# Patient Record
Sex: MALE | Race: WHITE | NOT HISPANIC OR LATINO | Employment: FULL TIME | URBAN - METROPOLITAN AREA
[De-identification: names, ages, dates, MRNs, and addresses within clinical notes are randomized per-mention and may not be internally consistent; named-entity substitution may affect disease eponyms.]

---

## 2018-10-01 ENCOUNTER — OFFICE VISIT (OUTPATIENT)
Dept: URGENT CARE | Facility: CLINIC | Age: 58
End: 2018-10-01
Payer: COMMERCIAL

## 2018-10-01 VITALS
HEIGHT: 70 IN | OXYGEN SATURATION: 97 % | HEART RATE: 68 BPM | SYSTOLIC BLOOD PRESSURE: 114 MMHG | BODY MASS INDEX: 35.5 KG/M2 | DIASTOLIC BLOOD PRESSURE: 80 MMHG | RESPIRATION RATE: 16 BRPM | TEMPERATURE: 97.4 F | WEIGHT: 248 LBS

## 2018-10-01 DIAGNOSIS — L08.9 BLISTER OF TOE OF LEFT FOOT WITH INFECTION, INITIAL ENCOUNTER: ICD-10-CM

## 2018-10-01 DIAGNOSIS — S90.425A BLISTER OF TOE OF LEFT FOOT WITH INFECTION, INITIAL ENCOUNTER: ICD-10-CM

## 2018-10-01 DIAGNOSIS — H93.8X3 EAR FULLNESS, BILATERAL: ICD-10-CM

## 2018-10-01 PROCEDURE — 69210 REMOVE IMPACTED EAR WAX UNI: CPT | Performed by: NURSE PRACTITIONER

## 2018-10-01 PROCEDURE — 99204 OFFICE O/P NEW MOD 45 MIN: CPT | Mod: 25 | Performed by: NURSE PRACTITIONER

## 2018-10-01 RX ORDER — SULFAMETHOXAZOLE AND TRIMETHOPRIM 800; 160 MG/1; MG/1
1 TABLET ORAL 2 TIMES DAILY
Qty: 14 TAB | Refills: 0 | Status: SHIPPED | OUTPATIENT
Start: 2018-10-01 | End: 2018-10-08

## 2018-10-01 RX ORDER — SULFAMETHOXAZOLE AND TRIMETHOPRIM 800; 160 MG/1; MG/1
1 TABLET ORAL 2 TIMES DAILY
Qty: 14 TAB | Refills: 0 | Status: SHIPPED | OUTPATIENT
Start: 2018-10-01 | End: 2018-10-01 | Stop reason: SDUPTHER

## 2018-10-01 RX ORDER — ASPIRIN 81 MG/1
81 TABLET, CHEWABLE ORAL DAILY
COMMUNITY
End: 2018-10-31

## 2018-10-01 ASSESSMENT — ENCOUNTER SYMPTOMS
SORE THROAT: 0
SHORTNESS OF BREATH: 0
MYALGIAS: 0
DIZZINESS: 0
NAUSEA: 0
EYE PAIN: 0
VOMITING: 0
FATIGUE: 0
FEVER: 0
CHILLS: 0

## 2018-10-01 NOTE — PROGRESS NOTES
Subjective:     Edd Beal is a 57 y.o. male who presents for Blister (x 3wks, blister on Lt. small toe, not getting better) and Ear Fullness (x 1 wk, Rt. ear fullness)       Blister   This is a new problem. Episode onset: 3weeks. The problem occurs constantly. The problem has been unchanged. Pertinent negatives include no chest pain, chills, congestion, fatigue, fever, myalgias, nausea, rash, sore throat or vomiting. Nothing aggravates the symptoms. He has tried nothing for the symptoms. The treatment provided no relief.   Ear Fullness   This is a new problem. The current episode started 1 to 4 weeks ago. The problem occurs constantly. The problem has been unchanged. Pertinent negatives include no chest pain, chills, congestion, fatigue, fever, myalgias, nausea, rash, sore throat or vomiting. Nothing aggravates the symptoms. Treatments tried: otc flushes. The treatment provided no relief.   History reviewed. No pertinent past medical history.History reviewed. No pertinent surgical history.  Social History     Social History   • Marital status:      Spouse name: N/A   • Number of children: N/A   • Years of education: N/A     Occupational History   • Not on file.     Social History Main Topics   • Smoking status: Never Smoker   • Smokeless tobacco: Never Used   • Alcohol use Not on file   • Drug use: Unknown   • Sexual activity: Not on file     Other Topics Concern   • Not on file     Social History Narrative   • No narrative on file    History reviewed. No pertinent family history. Review of Systems   Constitutional: Negative for chills, fatigue and fever.   HENT: Negative for congestion and sore throat.    Eyes: Negative for pain.   Respiratory: Negative for shortness of breath.    Cardiovascular: Negative for chest pain.   Gastrointestinal: Negative for nausea and vomiting.   Genitourinary: Negative for hematuria.   Musculoskeletal: Negative for myalgias.   Skin: Negative for rash.  "  Neurological: Negative for dizziness.   No Known Allergies   Objective:   /80 (BP Location: Left arm, Patient Position: Sitting, BP Cuff Size: Large adult)   Pulse 68   Temp 36.3 °C (97.4 °F) (Temporal)   Resp 16   Ht 1.778 m (5' 10\")   Wt 112.5 kg (248 lb)   SpO2 97%   BMI 35.58 kg/m²   Physical Exam   Constitutional: He is oriented to person, place, and time. He appears well-developed and well-nourished. No distress.   HENT:   Head: Normocephalic and atraumatic.   Right Ear: External ear and ear canal normal.   Left Ear: External ear and ear canal normal.   Nose: Nose normal. Right sinus exhibits no maxillary sinus tenderness and no frontal sinus tenderness. Left sinus exhibits no maxillary sinus tenderness and no frontal sinus tenderness.   Mouth/Throat: Uvula is midline, oropharynx is clear and moist and mucous membranes are normal. No posterior oropharyngeal edema, posterior oropharyngeal erythema or tonsillar abscesses. No tonsillar exudate.   Cerumen impaction bilaterally      Eyes: Pupils are equal, round, and reactive to light. Conjunctivae and EOM are normal. Right eye exhibits no discharge. Left eye exhibits no discharge.   Cardiovascular: Normal rate and regular rhythm.    No murmur heard.  Pulmonary/Chest: Effort normal and breath sounds normal. No respiratory distress.   Abdominal: Soft. He exhibits no distension. There is no tenderness.   Musculoskeletal:        Feet:    Neurological: He is alert and oriented to person, place, and time. He has normal reflexes. No sensory deficit.   Skin: Skin is warm, dry and intact.   Psychiatric: He has a normal mood and affect.         Assessment/Plan:   Assessment    1. Blister of toe of left foot with infection, initial encounter  mupirocin (BACTROBAN) 2 % Ointment    REFERRAL TO FAMILY PRACTICE    sulfamethoxazole-trimethoprim (BACTRIM DS) 800-160 MG tablet    DISCONTINUED: sulfamethoxazole-trimethoprim (BACTRIM DS) 800-160 MG tablet   2. Ear " fullness, bilateral  REFERRAL TO FAMILY PRACTICE     Procedure: Cerumen Removal  Risks and benefits of procedure discussed  Cerumen removed with curette and lavage after softening agent instilled  Patient tolerated well  Post procedure exam with clear canal and normal TM    Advised to use topical ointment on left toe. Keep covered, separate toe with gauze. If not improved in 3-4 days advised to start oral abx therapy.  Contingent antibiotic prescription given to patient to fill upon meeting criteria of guidelines discussed.      Differential diagnosis, natural history, supportive care, and indications for immediate follow-up discussed.

## 2018-10-31 ENCOUNTER — OFFICE VISIT (OUTPATIENT)
Dept: INTERNAL MEDICINE | Facility: MEDICAL CENTER | Age: 58
End: 2018-10-31
Payer: COMMERCIAL

## 2018-10-31 VITALS
WEIGHT: 253.13 LBS | HEART RATE: 58 BPM | TEMPERATURE: 97.7 F | BODY MASS INDEX: 36.24 KG/M2 | OXYGEN SATURATION: 96 % | HEIGHT: 70 IN | DIASTOLIC BLOOD PRESSURE: 76 MMHG | SYSTOLIC BLOOD PRESSURE: 114 MMHG

## 2018-10-31 DIAGNOSIS — G47.33 OBSTRUCTIVE SLEEP APNEA SYNDROME: ICD-10-CM

## 2018-10-31 DIAGNOSIS — Z87.11 HISTORY OF GASTRIC ULCER: ICD-10-CM

## 2018-10-31 DIAGNOSIS — E78.5 DYSLIPIDEMIA: ICD-10-CM

## 2018-10-31 DIAGNOSIS — N40.0 BENIGN PROSTATIC HYPERPLASIA WITHOUT LOWER URINARY TRACT SYMPTOMS: ICD-10-CM

## 2018-10-31 PROCEDURE — 99204 OFFICE O/P NEW MOD 45 MIN: CPT | Mod: GC | Performed by: INTERNAL MEDICINE

## 2018-10-31 RX ORDER — PANTOPRAZOLE SODIUM 40 MG/1
40 TABLET, DELAYED RELEASE ORAL DAILY
COMMUNITY
End: 2018-11-19 | Stop reason: SDUPTHER

## 2018-10-31 RX ORDER — TAMSULOSIN HYDROCHLORIDE 0.4 MG/1
0.4 CAPSULE ORAL
COMMUNITY
End: 2018-11-19 | Stop reason: SDUPTHER

## 2018-10-31 ASSESSMENT — PATIENT HEALTH QUESTIONNAIRE - PHQ9: CLINICAL INTERPRETATION OF PHQ2 SCORE: 0

## 2018-10-31 NOTE — PROGRESS NOTES
New Patient to Establish    Reason to establish: New patient to establish    CC:   Chief Complaint   Patient presents with   • Establish Care   • Medication Refill     Pantoprazole and tamsulosin   • Orders Needed     CPAP. Pt uses [facility]       HPI:   58 yo M came in to establish. He came to [place] from [place] a few months ago due to his wife's medical problems and specialist need.    He denies any complaint today.   He has JOAQUINA and usually gets 90 day supply for mask from [employer]. He just need a PCP name to update to the company.   He was Dx 10 years ago and reevaluated 5 years ago. He was reluctant to go back to sleep clinic, saying that he previous PCP managed with the supplies.     He is on tamsulosin for BPH, only gets up twice at night.     He is on pantoprazole since 2010 when he was Dx with gastric ulcer. He was wondering if he can get off these meds.  He does not recall H pylori infection or the size of the ulcer. He could not recall gastroenterologist's name. He was in [place] for a long time where he has most medical records and moved to [place].    ROS:   All other systems reviewed, negative except as stated above.    Patient Active Problem List    Diagnosis Date Noted   • Obstructive sleep apnea syndrome 10/31/2018   • Benign prostatic hyperplasia without lower urinary tract symptoms 10/31/2018   • Dyslipidemia 10/31/2018   • History of gastric ulcer 10/31/2018       Past Medical History:   Diagnosis Date   • Facial basal cell cancer 2007    lip, no recurrence       Current Outpatient Prescriptions   Medication Sig Dispense Refill   • tamsulosin (FLOMAX) 0.4 MG capsule Take 0.4 mg by mouth ONE-HALF HOUR AFTER BREAKFAST.     • pantoprazole (PROTONIX) 40 MG Tablet Delayed Response Take 40 mg by mouth every day.       No current facility-administered medications for this visit.        Allergies as of 10/31/2018   • (No Known Allergies)       Social History     Social History   • Marital status:       "Spouse name: N/A   • Number of children: N/A   • Years of education: N/A     Occupational History   • Not on file.     Social History Main Topics   • Smoking status: Never Smoker   • Smokeless tobacco: Never Used   • Alcohol use Yes      Comment: occa   • Drug use: Yes   • Sexual activity: Not on file     Other Topics Concern   • Not on file     Social History Narrative   • No narrative on file       Family History   Problem Relation Age of Onset   • Cancer Mother 60        colon cancer   • Heart Disease Mother         CHF       Past Surgical History:   Procedure Laterality Date   • ATHROPLASTY      right knee   • EYE SURGERY      cataracts bilateral, corneal transplant   • HERNIA REPAIR           /76 (BP Location: Right arm, Patient Position: Sitting, BP Cuff Size: Large adult)   Pulse (!) 58   Temp 36.5 °C (97.7 °F) (Temporal)   Ht 1.778 m (5' 10\")   Wt 114.8 kg (253 lb 2 oz)   SpO2 96%   BMI 36.32 kg/m²     Physical Exam  General: Alert and oriented, No apparent distress.  Eyes: Pupils are equal and reactive. No scleral icterus.  Throat: Clear no erythema or exudates noted.  Neck: Supple. No lymphadenopathy noted. Thyroid not enlarged.  Lungs: Clear to auscultation bilaterally without any wheezing, crepitations.  Cardiovascular: Regular rate and rhythm. No murmurs, rubs or gallops.  Abdomen: Bowel sound +, soft, non tender, no rebound or guarding, no palpable organomegaly  Extremities: No clubbing, cyanosis, edema.  Skin: No rash or suspicious skin lesions noted.  Neuro: A & O x 3. Normal speech and memory. Motor and sensory grossly normal.     Note: I have reviewed all pertinent labs and diagnostic tests associated with this visit with specific comments listed under the assessment and plan below    Assessment and Plan    1. Obstructive sleep apnea syndrome  He declined sleep clinic referral at this time despite explaining the need of regular monitoring and possible need for adjustment of CPAP " setting. He will let us know when he notices symptom changes.     2. Benign prostatic hyperplasia without lower urinary tract symptoms  Symptoms controlled with tamsulosin, continue. Explained that he will likely require this medication for lifelong due to worsening BPH with age.    3. Dyslipidemia  No history of CAD, stroke or PVD. Non smoker.   - continue lifestyle management. Will get record for recent lab in 7/2018.    4. History of gastric ulcer  - continue pantoprazole for now. Will get previous records.    Td - 2017  Colonoscopy - 2014, said to be normal.  Declined flu shot today.    Followup: Return in about 6 months (around 4/30/2019).      Signed by: Kathy Boyd M.D.

## 2018-10-31 NOTE — LETTER
[facility]  Kathy Boyd M.D.  [address]  [place] [address]  Fax: [phone]   Authorization for Release/Disclosure of   Protected Health Information   Name: CHICA VENTURA : 1960 SSN: [id number]   Address: [address]  [place] [address] Phone:    [phone] (home)    I authorize the entity listed below to release/disclose the PHI below to:   [facility]/Kathy Boyd M.D. and Kathy Boyd M.D.   Provider or Entity Name: [facility] Alex Morfin     Address   [facility], [place], [place]   Phone:      Fax:     Reason for request: continuity of care   Information to be released:    [ X ] LAST COLONOSCOPY,  including any PATH REPORT and follow-up  [  ] LAST FIT/COLOGUARD RESULT [  ] LAST DEXA  [  ] LAST MAMMOGRAM  [  ] LAST PAP  [X ] LAST LABS [  ] RETINA EXAM REPORT  [X  ] IMMUNIZATION RECORDS  [ X ] Release all info      [  ] Check here and initial the line next to each item to release ALL health information INCLUDING  _____ Care and treatment for drug and / or alcohol abuse  _____ HIV testing, infection status, or AIDS  _____ Genetic Testing    DATES OF SERVICE OR TIME PERIOD TO BE DISCLOSED: _____________  I understand and acknowledge that:  * This Authorization may be revoked at any time by you in writing, except if your health information has already been used or disclosed.  * Your health information that will be used or disclosed as a result of you signing this authorization could be re-disclosed by the recipient. If this occurs, your re-disclosed health information may no longer be protected by State or Federal laws.  * You may refuse to sign this Authorization. Your refusal will not affect your ability to obtain treatment.  * This Authorization becomes effective upon signing and will  on (date) __________.      If no date is indicated, this Authorization will  one (1) year from the signature date.    Name: Chica Ventura    Signature:   Date:          10/31/2018       PLEASE FAX REQUESTED RECORDS BACK TO: [phone]

## 2018-10-31 NOTE — LETTER
[facility]  Kathy Boyd M.D.  [address]  [place] [address]  Fax: [phone]   Authorization for Release/Disclosure of   Protected Health Information   Name: CHICA VENTURA : 1960 SSN: [id number]   Address: [address]  [place] [address] Phone:    [phone] (home)    I authorize the entity listed below to release/disclose the PHI below to:   [facility]/Kathy Boyd M.D. and Kahty Boyd M.D.   Provider or Entity Name: [facility]     Address   City, [facility]   Phone:      Fax:     Reason for request: continuity of care   Information to be released:    [X} LAST COLONOSCOPY,  including any PATH REPORT and follow-up  [  ] LAST FIT/COLOGUARD RESULT [  ] LAST DEXA  [  ] LAST MAMMOGRAM  [  ] LAST PAP  [  ] LAST LABS [  ] RETINA EXAM REPORT  [  ] IMMUNIZATION RECORDS  [  ] Release all info      [  ] Check here and initial the line next to each item to release ALL health information INCLUDING  _____ Care and treatment for drug and / or alcohol abuse  _____ HIV testing, infection status, or AIDS  _____ Genetic Testing    DATES OF SERVICE OR TIME PERIOD TO BE DISCLOSED: _____________  I understand and acknowledge that:  * This Authorization may be revoked at any time by you in writing, except if your health information has already been used or disclosed.  * Your health information that will be used or disclosed as a result of you signing this authorization could be re-disclosed by the recipient. If this occurs, your re-disclosed health information may no longer be protected by State or Federal laws.  * You may refuse to sign this Authorization. Your refusal will not affect your ability to obtain treatment.  * This Authorization becomes effective upon signing and will  on (date) __________.      If no date is indicated, this Authorization will  one (1) year from the signature date.    Name: Chica Ventura    Signature:   Date:     10/31/2018            PLEASE FAX REQUESTED RECORDS BACK TO: [phone]

## 2018-11-09 ENCOUNTER — PATIENT MESSAGE (OUTPATIENT)
Dept: INTERNAL MEDICINE | Facility: MEDICAL CENTER | Age: 58
End: 2018-11-09

## 2018-11-09 DIAGNOSIS — Z87.19 HISTORY OF GI BLEED: ICD-10-CM

## 2018-11-09 DIAGNOSIS — E78.5 DYSLIPIDEMIA: ICD-10-CM

## 2018-11-09 NOTE — PATIENT COMMUNICATION
Called and spoke with pt. Pt said he walks a lot at his work. His right knee up to his hip feels like it's on fire. I asked if there is any discoloration/swelling or warm to the touch. Pt said no, only it feels warm to the touch. Pt is concerned if he has a blood clot. Per pt, he said before he felt like this but with swelling. He just has not swelling with this.  I asked pt I can look to see if there is any opening this afternoon in our clinic. Pt said he wouldn't be able to come today because he will be going to work in 30 min. Pt asked if he can make an appt for Monday. I recommended pt to be seen at an  to get this evaluated and not to wait until Monday. Pt understood. He said he will go tomorrow.       Pt also wanted to see if we got labs from [place]. I notified pt we have not. Pt is worried because he only has 3 weeks left of his medication. I asked pt if he would like Dr Boyd to order his labs. He said Yes.  Pt goes to [facility] on [facility].

## 2018-11-09 NOTE — TELEPHONE ENCOUNTER
From: Edd Beal  To: Kathy Boyd M.D.  Sent: 11/9/2018 11:42 AM PST  Subject: Procedure Question    Are you sending [facility] two prescriptions to [facility]. I am having burning pain in my leg when I’m walking. Can it be due to the blood clot I had in the past

## 2018-11-10 ENCOUNTER — OFFICE VISIT (OUTPATIENT)
Dept: URGENT CARE | Facility: CLINIC | Age: 58
End: 2018-11-10
Payer: COMMERCIAL

## 2018-11-10 ENCOUNTER — APPOINTMENT (OUTPATIENT)
Dept: RADIOLOGY | Facility: IMAGING CENTER | Age: 58
End: 2018-11-10
Attending: EMERGENCY MEDICINE
Payer: COMMERCIAL

## 2018-11-10 VITALS
DIASTOLIC BLOOD PRESSURE: 80 MMHG | SYSTOLIC BLOOD PRESSURE: 120 MMHG | HEIGHT: 70 IN | OXYGEN SATURATION: 98 % | WEIGHT: 249 LBS | RESPIRATION RATE: 16 BRPM | TEMPERATURE: 97.9 F | BODY MASS INDEX: 35.65 KG/M2 | HEART RATE: 77 BPM

## 2018-11-10 DIAGNOSIS — Z86.718 HISTORY OF DVT (DEEP VEIN THROMBOSIS): ICD-10-CM

## 2018-11-10 DIAGNOSIS — M79.651 PAIN OF RIGHT THIGH: ICD-10-CM

## 2018-11-10 PROCEDURE — 99202 OFFICE O/P NEW SF 15 MIN: CPT | Performed by: EMERGENCY MEDICINE

## 2018-11-10 PROCEDURE — 73552 X-RAY EXAM OF FEMUR 2/>: CPT | Mod: 26,RT | Performed by: EMERGENCY MEDICINE

## 2018-11-10 ASSESSMENT — ENCOUNTER SYMPTOMS
LEG PAIN: 1
SENSORY CHANGE: 0
FOCAL WEAKNESS: 0
CHANGE IN BOWEL HABIT: 0
TINGLING: 0
NUMBNESS: 0
JOINT SWELLING: 0
BACK PAIN: 0
FEVER: 0

## 2018-11-10 NOTE — PROGRESS NOTES
Subjective:      Edd Beal is a 57 y.o. male who presents with Leg Pain (PAIN GOING FROM RIGHT THIGH UP TO HIP X1 MONTH)            Leg Pain   This is a new problem. The current episode started more than 1 month ago. The problem occurs intermittently. The problem has been waxing and waning. Pertinent negatives include no change in bowel habit, fever, joint swelling, numbness, rash or urinary symptoms. The symptoms are aggravated by walking. He has tried rest for the symptoms. The treatment provided moderate relief.   Past medical history significant for right knee joint replacement, complicated by subsequent right DVT.  Notes over the past multiple weeks intermittent burning pain right anterior thigh radiating proximally and laterally.  No trauma, but notes new occupation with increased walking, stair climbing.    Review of Systems   Constitutional: Negative for fever.   Gastrointestinal: Negative for change in bowel habit.   Musculoskeletal: Negative for back pain, joint pain and joint swelling.   Skin: Negative for rash.   Neurological: Negative for tingling, sensory change, focal weakness and numbness.     PMH:  has a past medical history of Facial basal cell cancer (2007).  MEDS:   Current Outpatient Prescriptions:   •  tamsulosin (FLOMAX) 0.4 MG capsule, Take 0.4 mg by mouth ONE-HALF HOUR AFTER BREAKFAST., Disp: , Rfl:   •  pantoprazole (PROTONIX) 40 MG Tablet Delayed Response, Take 40 mg by mouth every day., Disp: , Rfl:   ALLERGIES: No Known Allergies  SURGHX:   Past Surgical History:   Procedure Laterality Date   • ATHROPLASTY      right knee   • EYE SURGERY      cataracts bilateral, corneal transplant   • HERNIA REPAIR       SOCHX:  reports that he has never smoked. He has never used smokeless tobacco. He reports that he drinks alcohol. He reports that he uses drugs.  FH: family history includes Cancer (age of onset: 60) in his mother; Heart Disease in his mother.       Objective:     /80  "(BP Location: Right arm, Patient Position: Sitting, BP Cuff Size: Adult)   Pulse 77   Temp 36.6 °C (97.9 °F) (Temporal)   Resp 16   Ht 1.778 m (5' 10\")   Wt 112.9 kg (249 lb)   SpO2 98%   BMI 35.73 kg/m²      Physical Exam   Constitutional: Vital signs are normal. He appears well-developed and well-nourished. He is cooperative. He does not have a sickly appearance. He does not appear ill. No distress.   Cardiovascular:   Pulses:       Popliteal pulses are 2+ on the right side.        Dorsalis pedis pulses are 2+ on the right side.        Posterior tibial pulses are 2+ on the right side.   No significant pedal edema. No calf tenderness, Homans sign negative.   Pulmonary/Chest: Effort normal.   Musculoskeletal:        Right knee: He exhibits normal range of motion, no swelling, no effusion, no deformity and normal alignment. No tenderness found.        Lumbar back: Normal.        Right upper leg: He exhibits no tenderness, no swelling, no edema and no deformity.   Notes pain right thigh associated with full internal and external rotation of hip.   Neurological: He is alert.   Distal motor function intact. Distal motor function intact.   Skin: Skin is warm, dry and intact. No rash noted.   Psychiatric: He has a normal mood and affect.               Assessment/Plan:     1. Pain of right thigh  Suspect muscular; advised stretches, ice, OTC analgesia  REF SPORTS MED  - DX-FEMUR-2+ RIGHT; per radiologist:  1.  No radiographic evidence of acute traumatic injury.  2.  Right knee arthroplasty.    2. History of DVT (deep vein thrombosis)  - D-DIMER; Future      "

## 2018-11-10 NOTE — TELEPHONE ENCOUNTER
Order placed for labs. Please either mail to patient or fax to lab per patient preference. Agreed with urgent evaluation if concerns for clot.

## 2018-11-12 ENCOUNTER — HOSPITAL ENCOUNTER (OUTPATIENT)
Dept: LAB | Facility: MEDICAL CENTER | Age: 58
End: 2018-11-12
Attending: EMERGENCY MEDICINE
Payer: COMMERCIAL

## 2018-11-12 ENCOUNTER — TELEPHONE (OUTPATIENT)
Dept: URGENT CARE | Facility: CLINIC | Age: 58
End: 2018-11-12

## 2018-11-12 DIAGNOSIS — M79.651 PAIN OF RIGHT THIGH: ICD-10-CM

## 2018-11-12 DIAGNOSIS — Z86.718 HISTORY OF DVT (DEEP VEIN THROMBOSIS): ICD-10-CM

## 2018-11-12 LAB — D DIMER PPP IA.FEU-MCNC: <0.4 UG/ML (FEU) (ref 0–0.5)

## 2018-11-12 PROCEDURE — 85379 FIBRIN DEGRADATION QUANT: CPT

## 2018-11-12 PROCEDURE — 36415 COLL VENOUS BLD VENIPUNCTURE: CPT

## 2018-11-12 NOTE — PATIENT COMMUNICATION
Faxed [facility] Orders [facility] and also mailed to pt.  Notified pt via "[employer]

## 2018-11-16 ENCOUNTER — HOSPITAL ENCOUNTER (OUTPATIENT)
Dept: LAB | Facility: MEDICAL CENTER | Age: 58
End: 2018-11-16
Attending: INTERNAL MEDICINE
Payer: COMMERCIAL

## 2018-11-16 DIAGNOSIS — E78.5 DYSLIPIDEMIA: ICD-10-CM

## 2018-11-16 DIAGNOSIS — Z87.19 HISTORY OF GI BLEED: ICD-10-CM

## 2018-11-16 LAB
ALBUMIN SERPL BCP-MCNC: 4.3 G/DL (ref 3.2–4.9)
ALBUMIN/GLOB SERPL: 1.6 G/DL
ALP SERPL-CCNC: 87 U/L (ref 30–99)
ALT SERPL-CCNC: 36 U/L (ref 2–50)
ANION GAP SERPL CALC-SCNC: 9 MMOL/L (ref 0–11.9)
AST SERPL-CCNC: 32 U/L (ref 12–45)
BASOPHILS # BLD AUTO: 0.7 % (ref 0–1.8)
BASOPHILS # BLD: 0.06 K/UL (ref 0–0.12)
BILIRUB SERPL-MCNC: 0.7 MG/DL (ref 0.1–1.5)
BUN SERPL-MCNC: 17 MG/DL (ref 8–22)
CALCIUM SERPL-MCNC: 9.6 MG/DL (ref 8.5–10.5)
CHLORIDE SERPL-SCNC: 104 MMOL/L (ref 96–112)
CHOLEST SERPL-MCNC: 155 MG/DL (ref 100–199)
CO2 SERPL-SCNC: 26 MMOL/L (ref 20–33)
CREAT SERPL-MCNC: 1.16 MG/DL (ref 0.5–1.4)
EOSINOPHIL # BLD AUTO: 0.2 K/UL (ref 0–0.51)
EOSINOPHIL NFR BLD: 2.2 % (ref 0–6.9)
ERYTHROCYTE [DISTWIDTH] IN BLOOD BY AUTOMATED COUNT: 40.6 FL (ref 35.9–50)
FASTING STATUS PATIENT QL REPORTED: NORMAL
GLOBULIN SER CALC-MCNC: 2.7 G/DL (ref 1.9–3.5)
GLUCOSE SERPL-MCNC: 94 MG/DL (ref 65–99)
HCT VFR BLD AUTO: 46.3 % (ref 42–52)
HDLC SERPL-MCNC: 29 MG/DL
HGB BLD-MCNC: 16.5 G/DL (ref 14–18)
IMM GRANULOCYTES # BLD AUTO: 0.03 K/UL (ref 0–0.11)
IMM GRANULOCYTES NFR BLD AUTO: 0.3 % (ref 0–0.9)
LDLC SERPL CALC-MCNC: 47 MG/DL
LYMPHOCYTES # BLD AUTO: 2.87 K/UL (ref 1–4.8)
LYMPHOCYTES NFR BLD: 31.7 % (ref 22–41)
MCH RBC QN AUTO: 32.7 PG (ref 27–33)
MCHC RBC AUTO-ENTMCNC: 35.6 G/DL (ref 33.7–35.3)
MCV RBC AUTO: 91.9 FL (ref 81.4–97.8)
MONOCYTES # BLD AUTO: 0.68 K/UL (ref 0–0.85)
MONOCYTES NFR BLD AUTO: 7.5 % (ref 0–13.4)
NEUTROPHILS # BLD AUTO: 5.21 K/UL (ref 1.82–7.42)
NEUTROPHILS NFR BLD: 57.6 % (ref 44–72)
NRBC # BLD AUTO: 0 K/UL
NRBC BLD-RTO: 0 /100 WBC
PLATELET # BLD AUTO: 191 K/UL (ref 164–446)
PMV BLD AUTO: 10.8 FL (ref 9–12.9)
POTASSIUM SERPL-SCNC: 3.7 MMOL/L (ref 3.6–5.5)
PROT SERPL-MCNC: 7 G/DL (ref 6–8.2)
RBC # BLD AUTO: 5.04 M/UL (ref 4.7–6.1)
SODIUM SERPL-SCNC: 139 MMOL/L (ref 135–145)
TRIGL SERPL-MCNC: 397 MG/DL (ref 0–149)
WBC # BLD AUTO: 9.1 K/UL (ref 4.8–10.8)

## 2018-11-16 PROCEDURE — 36415 COLL VENOUS BLD VENIPUNCTURE: CPT

## 2018-11-16 PROCEDURE — 85025 COMPLETE CBC W/AUTO DIFF WBC: CPT

## 2018-11-16 PROCEDURE — 80053 COMPREHEN METABOLIC PANEL: CPT

## 2018-11-16 PROCEDURE — 80061 LIPID PANEL: CPT

## 2018-11-19 DIAGNOSIS — N40.0 BENIGN PROSTATIC HYPERPLASIA WITHOUT LOWER URINARY TRACT SYMPTOMS: ICD-10-CM

## 2018-11-19 DIAGNOSIS — Z87.11 HISTORY OF GASTRIC ULCER: ICD-10-CM

## 2018-11-19 RX ORDER — TAMSULOSIN HYDROCHLORIDE 0.4 MG/1
0.4 CAPSULE ORAL
Qty: 90 CAP | Refills: 3 | Status: SHIPPED | OUTPATIENT
Start: 2018-11-19 | End: 2019-06-26

## 2018-11-19 RX ORDER — PANTOPRAZOLE SODIUM 40 MG/1
40 TABLET, DELAYED RELEASE ORAL DAILY
Qty: 90 TAB | Refills: 3 | Status: SHIPPED | OUTPATIENT
Start: 2018-11-19 | End: 2019-06-26

## 2018-11-26 ENCOUNTER — OFFICE VISIT (OUTPATIENT)
Dept: MEDICAL GROUP | Facility: CLINIC | Age: 58
End: 2018-11-26
Payer: COMMERCIAL

## 2018-11-26 VITALS
WEIGHT: 249 LBS | HEIGHT: 70 IN | OXYGEN SATURATION: 98 % | TEMPERATURE: 98.6 F | SYSTOLIC BLOOD PRESSURE: 122 MMHG | BODY MASS INDEX: 35.65 KG/M2 | RESPIRATION RATE: 16 BRPM | DIASTOLIC BLOOD PRESSURE: 82 MMHG | HEART RATE: 74 BPM

## 2018-11-26 DIAGNOSIS — G57.11 MERALGIA PARESTHETICA OF RIGHT SIDE: ICD-10-CM

## 2018-11-26 PROCEDURE — 99203 OFFICE O/P NEW LOW 30 MIN: CPT | Performed by: FAMILY MEDICINE

## 2018-11-26 ASSESSMENT — ENCOUNTER SYMPTOMS
VOMITING: 0
NAUSEA: 0
DIZZINESS: 0
CHILLS: 0
SHORTNESS OF BREATH: 0
HEADACHES: 0
FEVER: 0

## 2018-11-26 NOTE — PROGRESS NOTES
"Subjective:      Edd Beal is a 58 y.o. male who presents with Leg Pain (Referral from [facility]/ R thigh pain )     Referred by Bhavesh Zhang MD for evaluation of RIGHT thigh burning    HPI   RIGHT thigh burning  Insidious onset, approximately mid September 2018  Pain is predominantly at the anterior aspect of the thigh from the region just above his RIGHT knee into the RIGHT proximal thigh  Worse with ambulation/excessive walking  Improved with rest/sitting and massage  No radiation  Denies lumbar spine pain  No night symptoms  Not currently taking medication for pain  POSITIVE prior history of DVT in the RIGHT lower extremity which occurred after his RIGHT total knee replacement back in 2015    Review of Systems   Constitutional: Negative for chills and fever.   Respiratory: Negative for shortness of breath.    Cardiovascular: Negative for chest pain.   Gastrointestinal: Negative for nausea and vomiting.   Neurological: Negative for dizziness and headaches.     PMH:  has a past medical history of Facial basal cell cancer (2007).  MEDS:   Current Outpatient Prescriptions:   •  tamsulosin (FLOMAX) 0.4 MG capsule, Take 1 Cap by mouth ONE-HALF HOUR AFTER BREAKFAST., Disp: 90 Cap, Rfl: 3  •  pantoprazole (PROTONIX) 40 MG Tablet Delayed Response, Take 1 Tab by mouth every day., Disp: 90 Tab, Rfl: 3  ALLERGIES: No Known Allergies  SURGHX:   Past Surgical History:   Procedure Laterality Date   • ATHROPLASTY      right knee   • EYE SURGERY      cataracts bilateral, corneal transplant   • HERNIA REPAIR       SOCHX:  reports that he has never smoked. He has never used smokeless tobacco. He reports that he drinks alcohol. He reports that he uses drugs.  FH: Family history was reviewed, no pertinent findings to report       Objective:     /82 (BP Location: Left arm, Patient Position: Sitting, BP Cuff Size: Adult)   Pulse 74   Temp 37 °C (98.6 °F) (Temporal)   Resp 16   Ht 1.778 m (5' 10\")   Wt 112.9 kg (249 lb) "   SpO2 98%   BMI 35.73 kg/m²      Physical Exam     HIP EXAM:  NORMAL gait    Right hip: Range of motion is intact  NEGATIVE pain with internal rotation  ray's test is NEGATIVE  NO tenderness of the trochanteric bursa  NO tenderness of the gluteus medius  Abad's test is NEGATIVE    Left hip: Range of motion is intact  NEGATIVE pain with internal rotation  ray's test is NEGATIVE  NO tenderness of the trochanteric bursa  NO tenderness of the gluteus medius  Abad's test is NEGATIVE    Lumbar spine exam:  No acute distress  Able to walk on heels and toes  Able to flex to 90° without discomfort  Extension and lateral rotation without discomfort  Strength testing with hip flexion, knee flexion and extension, ankle dorsiflexion and plantarflexion, and EHL testing were 5 out of 5 bilaterally  Sensation was SLIGHTLY DECREASED at the lateral proximal leg and the RIGHT (L5 pattern) compared to the left  The legs were otherwise neurovascularly intact        Assessment/Plan:     1. Meralgia paresthetica of right side       Patient works as a  ([employer]) and wears a utility belt  Since he does not carry very many items, he will stop using his utility belt and use his cargo pants pockets instead to carry his supplies/handcuffs    Return in about 2 weeks (around 12/10/2018).  To see how he is doing after discontinuing regular use of his work belt  He was also provided with myalgia paresthetica           11/10/2018 10:31 AM    HISTORY/REASON FOR EXAM:  Right leg pain..  .    TECHNIQUE/EXAM DESCRIPTION AND NUMBER OF VIEWS:  2 views of the RIGHT femur.    COMPARISON: None    FINDINGS:  There are surgical changes involving the knee consistent with arthroplasty. There are vascular calcifications within the pelvis.   Impression       1.  No radiographic evidence of acute traumatic injury.    2.  Right knee arthroplasty.     Interpreted in the office today with the patient    Thank you Bhavesh Zhang MD for allowing me  to participate in caring for your patient.

## 2019-01-23 ENCOUNTER — TELEPHONE (OUTPATIENT)
Dept: INTERNAL MEDICINE | Facility: MEDICAL CENTER | Age: 59
End: 2019-01-23

## 2019-01-23 DIAGNOSIS — Z12.12 SCREENING FOR COLORECTAL CANCER: Primary | ICD-10-CM

## 2019-01-23 DIAGNOSIS — Z12.11 SCREENING FOR COLORECTAL CANCER: Primary | ICD-10-CM

## 2019-01-24 NOTE — TELEPHONE ENCOUNTER
Patient told me that he completed colonoscopy with previous provider in 2014, said to be normal result. Will request record when he visits our clinic in April.

## 2019-01-30 ENCOUNTER — OFFICE VISIT (OUTPATIENT)
Dept: MEDICAL GROUP | Facility: MEDICAL CENTER | Age: 59
End: 2019-01-30
Payer: COMMERCIAL

## 2019-01-30 VITALS
HEART RATE: 74 BPM | WEIGHT: 255 LBS | DIASTOLIC BLOOD PRESSURE: 70 MMHG | RESPIRATION RATE: 16 BRPM | SYSTOLIC BLOOD PRESSURE: 128 MMHG | TEMPERATURE: 97.4 F | HEIGHT: 70 IN | BODY MASS INDEX: 36.51 KG/M2 | OXYGEN SATURATION: 98 %

## 2019-01-30 DIAGNOSIS — H83.3X2 NOISE-INDUCED HEARING LOSS OF LEFT EAR WITH UNRESTRICTED HEARING OF RIGHT EAR: ICD-10-CM

## 2019-01-30 DIAGNOSIS — Z12.11 SCREEN FOR COLON CANCER: ICD-10-CM

## 2019-01-30 PROCEDURE — 99214 OFFICE O/P EST MOD 30 MIN: CPT | Performed by: NURSE PRACTITIONER

## 2019-01-31 NOTE — ASSESSMENT & PLAN NOTE
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 Was using ear protection but it may have been old and not in place correctly. Having some ear pain/fullness and ringing. Right ear is unaffected.     Denies ear drainage, fevers, external ear pain/tenderness. Does have mild headache. States hearing is better today than it was yesterday.

## 2019-01-31 NOTE — PROGRESS NOTES
"Subjective:   Edd Beal is a 58 y.o. male here today for hearing loss:    Noise-induced hearing loss of left ear with unrestricted hearing of right ear  Started yesterday after target practice. Was using ear protection but it may have been old and not in place correctly. Having some ear pain/fullness and ringing. Right ear is unaffected.     Denies ear drainage, fevers, external ear pain/tenderness. Does have mild headache. States hearing is better today than it was yesterday.        Current medicines (including changes today)  Current Outpatient Prescriptions   Medication Sig Dispense Refill   • tamsulosin (FLOMAX) 0.4 MG capsule Take 1 Cap by mouth ONE-HALF HOUR AFTER BREAKFAST. 90 Cap 3   • pantoprazole (PROTONIX) 40 MG Tablet Delayed Response Take 1 Tab by mouth every day. 90 Tab 3     No current facility-administered medications for this visit.      He  has a past medical history of Facial basal cell cancer (2007).    ROS   No chest pain, no shortness of breath, no abdominal pain  Positive ROS as per HPI.  All other systems reviewed and are negative.     Objective:     Blood pressure 128/70, pulse 74, temperature 36.3 °C (97.4 °F), temperature source Temporal, resp. rate 16, height 1.778 m (5' 10\"), weight 115.7 kg (255 lb), SpO2 98 %. Body mass index is 36.59 kg/m².     Physical Exam:  Constitutional: Alert, no distress.  Skin: Warm, dry, good turgor, no rashes in visible areas.  Eye: Equal, round and reactive, conjunctiva clear, lids normal.  ENMT: Lips without lesions, good dentition, oropharynx clear. Left TM retracted and mildly injected, intact, no drainage. Serous effusion noted. Right TM WNL  Neck: Trachea midline, no masses, no thyromegaly. No cervical or supraclavicular lymphadenopathy  Respiratory: Unlabored respiratory effort  Cardiovascular: No edema.  Psych: Alert and oriented x3, normal affect and mood.        Assessment and Plan:   The following treatment plan was discussed    1. 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     2. Screen for colon cancer  - REFERRAL TO GI FOR COLONOSCOPY      Followup: Return if symptoms worsen or fail to improve.    I have placed the below orders and discussed them with an approved delegating provider. The MA is performing the below orders under the direction of Dr. Staton

## 2019-03-17 ENCOUNTER — OFFICE VISIT (OUTPATIENT)
Dept: URGENT CARE | Facility: CLINIC | Age: 59
End: 2019-03-17
Payer: COMMERCIAL

## 2019-03-17 VITALS
TEMPERATURE: 98.2 F | OXYGEN SATURATION: 94 % | HEART RATE: 73 BPM | BODY MASS INDEX: 36.11 KG/M2 | SYSTOLIC BLOOD PRESSURE: 122 MMHG | HEIGHT: 70 IN | WEIGHT: 252.2 LBS | DIASTOLIC BLOOD PRESSURE: 80 MMHG

## 2019-03-17 DIAGNOSIS — J20.9 ACUTE BRONCHITIS, UNSPECIFIED ORGANISM: ICD-10-CM

## 2019-03-17 PROCEDURE — 99214 OFFICE O/P EST MOD 30 MIN: CPT | Performed by: FAMILY MEDICINE

## 2019-03-17 RX ORDER — DOXYCYCLINE HYCLATE 100 MG
100 TABLET ORAL 2 TIMES DAILY
Qty: 20 TAB | Refills: 0 | Status: SHIPPED | OUTPATIENT
Start: 2019-03-17 | End: 2019-03-27

## 2019-03-17 NOTE — PROGRESS NOTES
"HPI: Edd Beal is a 58 y.o. male who presents with   Chief Complaint   Patient presents with   • Cough     X1 week/ runny nose/ sore throat/ nasal drip    patient presents to  with one week of cough and nasal congestion. Cough is productive. No sob or chest pain. No fevers or chills but some boday aches and malaise has been present.   No n,v,d    Worsened by: activity, laying supine at night, first thing in the morning, when exposed to outside allergens  Improved by: OTC symptomatic medictions    Ros Review of Systems performed. All other systems are negative except for what is listed above.     PMH:  has a past medical history of Facial basal cell cancer (2007). sleep apnea  MEDS:   Current Outpatient Prescriptions:   •  tamsulosin (FLOMAX) 0.4 MG capsule, Take 1 Cap by mouth ONE-HALF HOUR AFTER BREAKFAST., Disp: 90 Cap, Rfl: 3  •  pantoprazole (PROTONIX) 40 MG Tablet Delayed Response, Take 1 Tab by mouth every day., Disp: 90 Tab, Rfl: 3  ALLERGIES: No Known Allergies  SURGHX:   Past Surgical History:   Procedure Laterality Date   • ATHROPLASTY      right knee   • EYE SURGERY      cataracts bilateral, corneal transplant   • EYE SURGERY      cataract AND corneal replacement BILATERALLY   • HERNIA REPAIR       SOCHX:  reports that he has never smoked. He has never used smokeless tobacco. He reports that he drinks alcohol. He reports that he does not use drugs.  FH: Family history was reviewed, no pertinent findings to report    PE:  Vitals /80   Pulse 73   Temp 36.8 °C (98.2 °F) (Temporal)   Ht 1.778 m (5' 10\")   Wt 114.4 kg (252 lb 3.2 oz)   SpO2 94%   BMI 36.19 kg/m²    Gen AOx4, NAD  HEENT: moist mucus membranes, no pain or pressure with percussion of frontal, maxillary or ethmoid sinuses.  Bilateral conjunciva clear without erythema or exudate,  Bilateral TM's clear without bulge, fluid or loss of landmarks, no pharyngeal erythema or tonsillar exudate or tonsillar enlargement  Neck: " supple, no cervical lymphadenopathy, no signs of menigismus  CV/PULM: RRR no murmurs, no ralesbut mild ronchi without wheezes are present, no signs of resp distress  Abd soft nontender, bs present  Skin no rashes  Extremities -c/c/e  Neuro appropriate affect,     A/P  1. Acute bronchitis, unspecified organism  Hydrocod Polst-CPM Polst ER (TUSSIONEX) 10-8 MG/5ML Suspension Extended Release    doxycycline (VIBRAMYCIN) 100 MG Tab     Differential diagnosis, natural history, supportive care discussed. Follow-up with primary care provider within 7-10 days, emergency room precautions discussed.  Patient and/or family appears understanding of information.

## 2019-06-18 ENCOUNTER — OFFICE VISIT (OUTPATIENT)
Dept: URGENT CARE | Facility: MEDICAL CENTER | Age: 59
End: 2019-06-18
Payer: COMMERCIAL

## 2019-06-18 ENCOUNTER — HOSPITAL ENCOUNTER (OUTPATIENT)
Facility: MEDICAL CENTER | Age: 59
End: 2019-06-18
Attending: PHYSICIAN ASSISTANT
Payer: COMMERCIAL

## 2019-06-18 VITALS
HEIGHT: 70 IN | WEIGHT: 252 LBS | RESPIRATION RATE: 16 BRPM | TEMPERATURE: 97.9 F | DIASTOLIC BLOOD PRESSURE: 82 MMHG | SYSTOLIC BLOOD PRESSURE: 124 MMHG | HEART RATE: 92 BPM | OXYGEN SATURATION: 94 % | BODY MASS INDEX: 36.08 KG/M2

## 2019-06-18 DIAGNOSIS — N39.0 RECURRENT UTI: ICD-10-CM

## 2019-06-18 DIAGNOSIS — N30.00 ACUTE CYSTITIS WITHOUT HEMATURIA: ICD-10-CM

## 2019-06-18 LAB
APPEARANCE UR: NORMAL
BILIRUB UR STRIP-MCNC: NORMAL MG/DL
COLOR UR AUTO: NORMAL
GLUCOSE UR STRIP.AUTO-MCNC: NORMAL MG/DL
KETONES UR STRIP.AUTO-MCNC: NORMAL MG/DL
LEUKOCYTE ESTERASE UR QL STRIP.AUTO: NORMAL
NITRITE UR QL STRIP.AUTO: NORMAL
PH UR STRIP.AUTO: 6.5 [PH] (ref 5–8)
PROT UR QL STRIP: 100 MG/DL
RBC UR QL AUTO: NORMAL
SP GR UR STRIP.AUTO: 1.02
UROBILINOGEN UR STRIP-MCNC: 0.2 MG/DL

## 2019-06-18 PROCEDURE — 87077 CULTURE AEROBIC IDENTIFY: CPT

## 2019-06-18 PROCEDURE — 87186 SC STD MICRODIL/AGAR DIL: CPT

## 2019-06-18 PROCEDURE — 87086 URINE CULTURE/COLONY COUNT: CPT

## 2019-06-18 PROCEDURE — 99214 OFFICE O/P EST MOD 30 MIN: CPT | Performed by: PHYSICIAN ASSISTANT

## 2019-06-18 PROCEDURE — 81002 URINALYSIS NONAUTO W/O SCOPE: CPT | Performed by: PHYSICIAN ASSISTANT

## 2019-06-18 RX ORDER — CIPROFLOXACIN 500 MG/1
500 TABLET, FILM COATED ORAL EVERY 12 HOURS
Qty: 14 TAB | Refills: 0 | Status: SHIPPED | OUTPATIENT
Start: 2019-06-18 | End: 2019-06-25

## 2019-06-18 ASSESSMENT — ENCOUNTER SYMPTOMS
SHORTNESS OF BREATH: 0
NAUSEA: 0
FLANK PAIN: 0
CHILLS: 0
PALPITATIONS: 0
VOMITING: 0
COUGH: 0
HEADACHES: 0
FEVER: 0
BACK PAIN: 0

## 2019-06-18 NOTE — PROGRESS NOTES
"Subjective:      Edd Beal is a 58 y.o. male who presents with UTI (x 1 week, prone to these, on flomax yohannes his bladder doesnt empty all the way )            Dysuria    This is a recurrent problem. Episode onset: 1 week. Second UTI this month. Several UTIs this past year. Saw a Urologist in [place]. The quality of the pain is described as burning. There has been no fever. Associated symptoms include frequency and urgency. Pertinent negatives include no chills, discharge, flank pain, hematuria, nausea or vomiting. He has tried nothing for the symptoms. His past medical history is significant for recurrent UTIs. BPH. On Flomax     Past Medical History:   Diagnosis Date   • Facial basal cell cancer 2007    lip, no recurrence       Past Surgical History:   Procedure Laterality Date   • ATHROPLASTY      right knee   • EYE SURGERY      cataracts bilateral, corneal transplant   • EYE SURGERY      cataract AND corneal replacement BILATERALLY   • HERNIA REPAIR         Family History   Problem Relation Age of Onset   • Cancer Mother 60        colon cancer   • Heart Disease Mother         CHF       No Known Allergies    Medications, Allergies, and current problem list reviewed today in Epic      Review of Systems   Constitutional: Negative for chills, fever and malaise/fatigue.   Respiratory: Negative for cough and shortness of breath.    Cardiovascular: Negative for chest pain, palpitations and leg swelling.   Gastrointestinal: Negative for nausea and vomiting.   Genitourinary: Positive for dysuria, frequency and urgency. Negative for flank pain and hematuria.   Musculoskeletal: Negative for back pain.   Neurological: Negative for headaches.     All other systems reviewed and are negative.        Objective:     /82   Pulse 92   Temp 36.6 °C (97.9 °F)   Resp 16   Ht 1.778 m (5' 10\")   Wt 114.3 kg (252 lb)   SpO2 94%   BMI 36.16 kg/m²      Physical Exam   Constitutional: He is oriented to person, place, " and time. He appears well-developed and well-nourished. No distress.   Eyes: Conjunctivae are normal.   Cardiovascular: Normal rate, regular rhythm and normal heart sounds.  Exam reveals no gallop and no friction rub.    No murmur heard.  Pulmonary/Chest: Effort normal and breath sounds normal. No respiratory distress. He has no wheezes. He has no rales.   Abdominal: Soft. He exhibits no distension and no mass. There is tenderness (mild suprapubic TTP). There is no rigidity, no rebound, no guarding and no CVA tenderness.   Neurological: He is alert and oriented to person, place, and time. No cranial nerve deficit.   Skin: Skin is warm and dry. No rash noted.   Psychiatric: He has a normal mood and affect. His behavior is normal. Judgment and thought content normal.       UA: positive for leuks, positive for blood   Assessment/Plan:     1. Acute cystitis without hematuria    - POCT Urinalysis  - Urine Culture; Future  - ciprofloxacin (CIPRO) 500 MG Tab; Take 1 Tab by mouth every 12 hours for 7 days.  Dispense: 14 Tab; Refill: 0  - REFERRAL TO UROLOGY    2. Recurrent UTI    - REFERRAL TO UROLOGY    Push fluids.     Differential diagnoses, Supportive care, and indications for immediate follow-up discussed with patient.   Instructed to return to clinic or nearest emergency department for any change in condition, further concerns, or worsening of symptoms.    The patient demonstrated a good understanding and agreed with the treatment plan.    Sharri Bell P.A.-C.

## 2019-06-19 DIAGNOSIS — N30.00 ACUTE CYSTITIS WITHOUT HEMATURIA: ICD-10-CM

## 2019-06-22 LAB
BACTERIA UR CULT: ABNORMAL
BACTERIA UR CULT: ABNORMAL
SIGNIFICANT IND 70042: ABNORMAL
SITE SITE: ABNORMAL
SOURCE SOURCE: ABNORMAL

## 2019-06-23 ENCOUNTER — TELEPHONE (OUTPATIENT)
Dept: URGENT CARE | Facility: PHYSICIAN GROUP | Age: 59
End: 2019-06-23

## 2019-06-23 DIAGNOSIS — N30.00 ACUTE CYSTITIS WITHOUT HEMATURIA: ICD-10-CM

## 2019-06-23 RX ORDER — NITROFURANTOIN 25; 75 MG/1; MG/1
100 CAPSULE ORAL 2 TIMES DAILY
Qty: 10 CAP | Refills: 0 | Status: SHIPPED | OUTPATIENT
Start: 2019-06-23 | End: 2019-06-26

## 2019-06-23 NOTE — TELEPHONE ENCOUNTER
Discussed results with patient. Will have him stop Cipro. Switch to Macrobid based on C&S. RX sent to pharmacy. Patient verbalized understanding.    Sharri Bell P.A.-C.

## 2019-06-26 ENCOUNTER — HOSPITAL ENCOUNTER (OUTPATIENT)
Facility: MEDICAL CENTER | Age: 59
End: 2019-06-27
Attending: EMERGENCY MEDICINE | Admitting: HOSPITALIST
Payer: COMMERCIAL

## 2019-06-26 ENCOUNTER — ANESTHESIA (OUTPATIENT)
Dept: SURGERY | Facility: MEDICAL CENTER | Age: 59
End: 2019-06-26
Payer: COMMERCIAL

## 2019-06-26 ENCOUNTER — ANESTHESIA EVENT (OUTPATIENT)
Dept: SURGERY | Facility: MEDICAL CENTER | Age: 59
End: 2019-06-26
Payer: COMMERCIAL

## 2019-06-26 ENCOUNTER — APPOINTMENT (OUTPATIENT)
Dept: RADIOLOGY | Facility: MEDICAL CENTER | Age: 59
End: 2019-06-26
Attending: EMERGENCY MEDICINE
Payer: COMMERCIAL

## 2019-06-26 DIAGNOSIS — N39.0 ACUTE UTI: ICD-10-CM

## 2019-06-26 DIAGNOSIS — R33.9 URINARY RETENTION: ICD-10-CM

## 2019-06-26 DIAGNOSIS — N21.1 URETHRAL STONE: ICD-10-CM

## 2019-06-26 PROBLEM — R33.8 BENIGN PROSTATIC HYPERPLASIA WITH URINARY RETENTION: Status: ACTIVE | Noted: 2018-10-31

## 2019-06-26 PROBLEM — N13.8 BENIGN PROSTATIC HYPERPLASIA WITH URINARY OBSTRUCTION: Status: ACTIVE | Noted: 2018-10-31

## 2019-06-26 PROBLEM — R82.90 ABNORMAL URINALYSIS: Status: ACTIVE | Noted: 2019-06-26

## 2019-06-26 PROBLEM — N40.1 BENIGN PROSTATIC HYPERPLASIA WITH URINARY OBSTRUCTION: Status: ACTIVE | Noted: 2018-10-31

## 2019-06-26 PROBLEM — R00.0 SINUS TACHYCARDIA: Status: ACTIVE | Noted: 2019-06-26

## 2019-06-26 LAB
ALBUMIN SERPL BCP-MCNC: 4.5 G/DL (ref 3.2–4.9)
ALBUMIN/GLOB SERPL: 1.6 G/DL
ALP SERPL-CCNC: 84 U/L (ref 30–99)
ALT SERPL-CCNC: 66 U/L (ref 2–50)
ANION GAP SERPL CALC-SCNC: 12 MMOL/L (ref 0–11.9)
APPEARANCE UR: CLEAR
AST SERPL-CCNC: 49 U/L (ref 12–45)
BACTERIA #/AREA URNS HPF: ABNORMAL /HPF
BASOPHILS # BLD AUTO: 0.6 % (ref 0–1.8)
BASOPHILS # BLD: 0.05 K/UL (ref 0–0.12)
BILIRUB SERPL-MCNC: 1.6 MG/DL (ref 0.1–1.5)
BILIRUB UR QL STRIP.AUTO: NEGATIVE
BUN SERPL-MCNC: 12 MG/DL (ref 8–22)
CALCIUM SERPL-MCNC: 8.9 MG/DL (ref 8.4–10.2)
CHLORIDE SERPL-SCNC: 102 MMOL/L (ref 96–112)
CO2 SERPL-SCNC: 22 MMOL/L (ref 20–33)
COLOR UR: YELLOW
CREAT SERPL-MCNC: 1.09 MG/DL (ref 0.5–1.4)
EOSINOPHIL # BLD AUTO: 0.13 K/UL (ref 0–0.51)
EOSINOPHIL NFR BLD: 1.6 % (ref 0–6.9)
EPI CELLS #/AREA URNS HPF: ABNORMAL /HPF
ERYTHROCYTE [DISTWIDTH] IN BLOOD BY AUTOMATED COUNT: 40.8 FL (ref 35.9–50)
GLOBULIN SER CALC-MCNC: 2.9 G/DL (ref 1.9–3.5)
GLUCOSE SERPL-MCNC: 110 MG/DL (ref 65–99)
GLUCOSE UR STRIP.AUTO-MCNC: NEGATIVE MG/DL
HCT VFR BLD AUTO: 50 % (ref 42–52)
HGB BLD-MCNC: 17.7 G/DL (ref 14–18)
IMM GRANULOCYTES # BLD AUTO: 0.03 K/UL (ref 0–0.11)
IMM GRANULOCYTES NFR BLD AUTO: 0.4 % (ref 0–0.9)
KETONES UR STRIP.AUTO-MCNC: ABNORMAL MG/DL
LEUKOCYTE ESTERASE UR QL STRIP.AUTO: ABNORMAL
LIPASE SERPL-CCNC: 24 U/L (ref 7–58)
LYMPHOCYTES # BLD AUTO: 2.39 K/UL (ref 1–4.8)
LYMPHOCYTES NFR BLD: 28.8 % (ref 22–41)
MCH RBC QN AUTO: 32 PG (ref 27–33)
MCHC RBC AUTO-ENTMCNC: 35.4 G/DL (ref 33.7–35.3)
MCV RBC AUTO: 90.4 FL (ref 81.4–97.8)
MICRO URNS: ABNORMAL
MONOCYTES # BLD AUTO: 0.67 K/UL (ref 0–0.85)
MONOCYTES NFR BLD AUTO: 8.1 % (ref 0–13.4)
MUCOUS THREADS #/AREA URNS HPF: ABNORMAL /HPF
NEUTROPHILS # BLD AUTO: 5.03 K/UL (ref 1.82–7.42)
NEUTROPHILS NFR BLD: 60.5 % (ref 44–72)
NITRITE UR QL STRIP.AUTO: NEGATIVE
NRBC # BLD AUTO: 0 K/UL
NRBC BLD-RTO: 0 /100 WBC
PATHOLOGY CONSULT NOTE: NORMAL
PH UR STRIP.AUTO: 6 [PH]
PLATELET # BLD AUTO: 190 K/UL (ref 164–446)
PMV BLD AUTO: 9.6 FL (ref 9–12.9)
POTASSIUM SERPL-SCNC: 3.9 MMOL/L (ref 3.6–5.5)
PROT SERPL-MCNC: 7.4 G/DL (ref 6–8.2)
PROT UR QL STRIP: NEGATIVE MG/DL
RBC # BLD AUTO: 5.53 M/UL (ref 4.7–6.1)
RBC # URNS HPF: ABNORMAL /HPF
RBC UR QL AUTO: ABNORMAL
SODIUM SERPL-SCNC: 136 MMOL/L (ref 135–145)
SP GR UR STRIP.AUTO: 1.02
WBC # BLD AUTO: 8.3 K/UL (ref 4.8–10.8)
WBC #/AREA URNS HPF: ABNORMAL /HPF

## 2019-06-26 PROCEDURE — 96365 THER/PROPH/DIAG IV INF INIT: CPT

## 2019-06-26 PROCEDURE — 700111 HCHG RX REV CODE 636 W/ 250 OVERRIDE (IP): Performed by: EMERGENCY MEDICINE

## 2019-06-26 PROCEDURE — 700105 HCHG RX REV CODE 258: Performed by: HOSPITALIST

## 2019-06-26 PROCEDURE — 700102 HCHG RX REV CODE 250 W/ 637 OVERRIDE(OP): Performed by: HOSPITALIST

## 2019-06-26 PROCEDURE — 87086 URINE CULTURE/COLONY COUNT: CPT

## 2019-06-26 PROCEDURE — A4344 CATH INDW FOLEY 2 WAY SILICN: HCPCS | Performed by: UROLOGY

## 2019-06-26 PROCEDURE — 96376 TX/PRO/DX INJ SAME DRUG ADON: CPT

## 2019-06-26 PROCEDURE — 160028 HCHG SURGERY MINUTES - 1ST 30 MINS LEVEL 3: Performed by: UROLOGY

## 2019-06-26 PROCEDURE — A4357 BEDSIDE DRAINAGE BAG: HCPCS | Performed by: UROLOGY

## 2019-06-26 PROCEDURE — 160048 HCHG OR STATISTICAL LEVEL 1-5: Performed by: UROLOGY

## 2019-06-26 PROCEDURE — A9270 NON-COVERED ITEM OR SERVICE: HCPCS | Performed by: HOSPITALIST

## 2019-06-26 PROCEDURE — 99285 EMERGENCY DEPT VISIT HI MDM: CPT

## 2019-06-26 PROCEDURE — 700111 HCHG RX REV CODE 636 W/ 250 OVERRIDE (IP): Performed by: ANESTHESIOLOGY

## 2019-06-26 PROCEDURE — 160002 HCHG RECOVERY MINUTES (STAT): Performed by: UROLOGY

## 2019-06-26 PROCEDURE — G0378 HOSPITAL OBSERVATION PER HR: HCPCS

## 2019-06-26 PROCEDURE — 74176 CT ABD & PELVIS W/O CONTRAST: CPT

## 2019-06-26 PROCEDURE — 99218 PR INITIAL OBSERVATION CARE,LEVL I: CPT | Performed by: HOSPITALIST

## 2019-06-26 PROCEDURE — 501329 HCHG SET, CYSTO IRRIG Y TUR: Performed by: UROLOGY

## 2019-06-26 PROCEDURE — 88300 SURGICAL PATH GROSS: CPT

## 2019-06-26 PROCEDURE — 501838 HCHG SUTURE GENERAL: Performed by: UROLOGY

## 2019-06-26 PROCEDURE — 36415 COLL VENOUS BLD VENIPUNCTURE: CPT

## 2019-06-26 PROCEDURE — 700105 HCHG RX REV CODE 258: Performed by: ANESTHESIOLOGY

## 2019-06-26 PROCEDURE — 700105 HCHG RX REV CODE 258: Performed by: EMERGENCY MEDICINE

## 2019-06-26 PROCEDURE — 700101 HCHG RX REV CODE 250: Performed by: ANESTHESIOLOGY

## 2019-06-26 PROCEDURE — 80053 COMPREHEN METABOLIC PANEL: CPT

## 2019-06-26 PROCEDURE — 83690 ASSAY OF LIPASE: CPT

## 2019-06-26 PROCEDURE — 85025 COMPLETE CBC W/AUTO DIFF WBC: CPT

## 2019-06-26 PROCEDURE — 500879 HCHG PACK, CYSTO: Performed by: UROLOGY

## 2019-06-26 PROCEDURE — 160035 HCHG PACU - 1ST 60 MINS PHASE I: Performed by: UROLOGY

## 2019-06-26 PROCEDURE — 82365 CALCULUS SPECTROSCOPY: CPT

## 2019-06-26 PROCEDURE — 81001 URINALYSIS AUTO W/SCOPE: CPT

## 2019-06-26 PROCEDURE — 160009 HCHG ANES TIME/MIN: Performed by: UROLOGY

## 2019-06-26 PROCEDURE — 96375 TX/PRO/DX INJ NEW DRUG ADDON: CPT

## 2019-06-26 RX ORDER — PROMETHAZINE HYDROCHLORIDE 25 MG/1
12.5-25 SUPPOSITORY RECTAL EVERY 4 HOURS PRN
Status: DISCONTINUED | OUTPATIENT
Start: 2019-06-26 | End: 2019-06-27 | Stop reason: HOSPADM

## 2019-06-26 RX ORDER — PANTOPRAZOLE SODIUM 40 MG/1
40 TABLET, DELAYED RELEASE ORAL EVERY EVENING
Status: DISCONTINUED | OUTPATIENT
Start: 2019-06-26 | End: 2019-06-26

## 2019-06-26 RX ORDER — AMOXICILLIN 250 MG
2 CAPSULE ORAL 2 TIMES DAILY
Status: DISCONTINUED | OUTPATIENT
Start: 2019-06-26 | End: 2019-06-27 | Stop reason: HOSPADM

## 2019-06-26 RX ORDER — ONDANSETRON 4 MG/1
4 TABLET, ORALLY DISINTEGRATING ORAL EVERY 4 HOURS PRN
Status: DISCONTINUED | OUTPATIENT
Start: 2019-06-26 | End: 2019-06-27 | Stop reason: HOSPADM

## 2019-06-26 RX ORDER — LORAZEPAM 2 MG/ML
1 INJECTION INTRAMUSCULAR ONCE
Status: COMPLETED | OUTPATIENT
Start: 2019-06-26 | End: 2019-06-26

## 2019-06-26 RX ORDER — MEPERIDINE HYDROCHLORIDE 25 MG/ML
25 INJECTION INTRAMUSCULAR; INTRAVENOUS; SUBCUTANEOUS
Status: DISCONTINUED | OUTPATIENT
Start: 2019-06-26 | End: 2019-06-26 | Stop reason: HOSPADM

## 2019-06-26 RX ORDER — OMEPRAZOLE 20 MG/1
20 CAPSULE, DELAYED RELEASE ORAL EVERY EVENING
Status: DISCONTINUED | OUTPATIENT
Start: 2019-06-26 | End: 2019-06-27 | Stop reason: HOSPADM

## 2019-06-26 RX ORDER — SODIUM CHLORIDE, SODIUM LACTATE, POTASSIUM CHLORIDE, CALCIUM CHLORIDE 600; 310; 30; 20 MG/100ML; MG/100ML; MG/100ML; MG/100ML
INJECTION, SOLUTION INTRAVENOUS
Status: DISCONTINUED | OUTPATIENT
Start: 2019-06-26 | End: 2019-06-26 | Stop reason: SURG

## 2019-06-26 RX ORDER — LABETALOL HYDROCHLORIDE 5 MG/ML
5 INJECTION, SOLUTION INTRAVENOUS
Status: DISCONTINUED | OUTPATIENT
Start: 2019-06-26 | End: 2019-06-26 | Stop reason: HOSPADM

## 2019-06-26 RX ORDER — POLYETHYLENE GLYCOL 3350 17 G/17G
1 POWDER, FOR SOLUTION ORAL
Status: DISCONTINUED | OUTPATIENT
Start: 2019-06-26 | End: 2019-06-27 | Stop reason: HOSPADM

## 2019-06-26 RX ORDER — MORPHINE SULFATE 4 MG/ML
4 INJECTION, SOLUTION INTRAMUSCULAR; INTRAVENOUS ONCE
Status: COMPLETED | OUTPATIENT
Start: 2019-06-26 | End: 2019-06-26

## 2019-06-26 RX ORDER — BISACODYL 10 MG
10 SUPPOSITORY, RECTAL RECTAL
Status: DISCONTINUED | OUTPATIENT
Start: 2019-06-26 | End: 2019-06-27 | Stop reason: HOSPADM

## 2019-06-26 RX ORDER — SODIUM CHLORIDE 9 MG/ML
INJECTION, SOLUTION INTRAVENOUS CONTINUOUS
Status: DISCONTINUED | OUTPATIENT
Start: 2019-06-26 | End: 2019-06-26

## 2019-06-26 RX ORDER — CIPROFLOXACIN 500 MG/1
500 TABLET, FILM COATED ORAL 2 TIMES DAILY
COMMUNITY
Start: 2019-06-18 | End: 2019-09-25

## 2019-06-26 RX ORDER — KETOROLAC TROMETHAMINE 30 MG/ML
INJECTION, SOLUTION INTRAMUSCULAR; INTRAVENOUS PRN
Status: DISCONTINUED | OUTPATIENT
Start: 2019-06-26 | End: 2019-06-26 | Stop reason: SURG

## 2019-06-26 RX ORDER — TAMSULOSIN HYDROCHLORIDE 0.4 MG/1
0.4 CAPSULE ORAL EVERY EVENING
Status: DISCONTINUED | OUTPATIENT
Start: 2019-06-26 | End: 2019-06-27 | Stop reason: HOSPADM

## 2019-06-26 RX ORDER — MORPHINE SULFATE 4 MG/ML
2 INJECTION, SOLUTION INTRAMUSCULAR; INTRAVENOUS
Status: DISCONTINUED | OUTPATIENT
Start: 2019-06-26 | End: 2019-06-27

## 2019-06-26 RX ORDER — ACETAMINOPHEN 325 MG/1
650 TABLET ORAL EVERY 6 HOURS PRN
Status: DISCONTINUED | OUTPATIENT
Start: 2019-06-26 | End: 2019-06-27 | Stop reason: HOSPADM

## 2019-06-26 RX ORDER — DIPHENHYDRAMINE HYDROCHLORIDE 50 MG/ML
12.5 INJECTION INTRAMUSCULAR; INTRAVENOUS
Status: DISCONTINUED | OUTPATIENT
Start: 2019-06-26 | End: 2019-06-26 | Stop reason: HOSPADM

## 2019-06-26 RX ORDER — SODIUM CHLORIDE, SODIUM LACTATE, POTASSIUM CHLORIDE, CALCIUM CHLORIDE 600; 310; 30; 20 MG/100ML; MG/100ML; MG/100ML; MG/100ML
INJECTION, SOLUTION INTRAVENOUS CONTINUOUS
Status: DISCONTINUED | OUTPATIENT
Start: 2019-06-26 | End: 2019-06-26 | Stop reason: HOSPADM

## 2019-06-26 RX ORDER — ONDANSETRON 2 MG/ML
4 INJECTION INTRAMUSCULAR; INTRAVENOUS EVERY 4 HOURS PRN
Status: DISCONTINUED | OUTPATIENT
Start: 2019-06-26 | End: 2019-06-27 | Stop reason: HOSPADM

## 2019-06-26 RX ORDER — PANTOPRAZOLE SODIUM 40 MG/1
40 TABLET, DELAYED RELEASE ORAL
COMMUNITY
End: 2022-05-26

## 2019-06-26 RX ORDER — NITROFURANTOIN 25; 75 MG/1; MG/1
100 CAPSULE ORAL 2 TIMES DAILY
COMMUNITY
Start: 2019-06-23 | End: 2019-09-25

## 2019-06-26 RX ORDER — TAMSULOSIN HYDROCHLORIDE 0.4 MG/1
0.4 CAPSULE ORAL
COMMUNITY

## 2019-06-26 RX ORDER — PROMETHAZINE HYDROCHLORIDE 25 MG/1
12.5-25 TABLET ORAL EVERY 4 HOURS PRN
Status: DISCONTINUED | OUTPATIENT
Start: 2019-06-26 | End: 2019-06-27 | Stop reason: HOSPADM

## 2019-06-26 RX ORDER — ONDANSETRON 2 MG/ML
4 INJECTION INTRAMUSCULAR; INTRAVENOUS
Status: DISCONTINUED | OUTPATIENT
Start: 2019-06-26 | End: 2019-06-26 | Stop reason: HOSPADM

## 2019-06-26 RX ORDER — SODIUM CHLORIDE 9 MG/ML
INJECTION, SOLUTION INTRAVENOUS CONTINUOUS
Status: DISCONTINUED | OUTPATIENT
Start: 2019-06-26 | End: 2019-06-27 | Stop reason: HOSPADM

## 2019-06-26 RX ORDER — OXYCODONE HYDROCHLORIDE 5 MG/1
5 TABLET ORAL
Status: DISCONTINUED | OUTPATIENT
Start: 2019-06-26 | End: 2019-06-27

## 2019-06-26 RX ORDER — ONDANSETRON 2 MG/ML
INJECTION INTRAMUSCULAR; INTRAVENOUS PRN
Status: DISCONTINUED | OUTPATIENT
Start: 2019-06-26 | End: 2019-06-26 | Stop reason: SURG

## 2019-06-26 RX ORDER — DEXAMETHASONE SODIUM PHOSPHATE 4 MG/ML
INJECTION, SOLUTION INTRA-ARTICULAR; INTRALESIONAL; INTRAMUSCULAR; INTRAVENOUS; SOFT TISSUE PRN
Status: DISCONTINUED | OUTPATIENT
Start: 2019-06-26 | End: 2019-06-26 | Stop reason: SURG

## 2019-06-26 RX ORDER — ONDANSETRON 2 MG/ML
4 INJECTION INTRAMUSCULAR; INTRAVENOUS ONCE
Status: COMPLETED | OUTPATIENT
Start: 2019-06-26 | End: 2019-06-26

## 2019-06-26 RX ORDER — SODIUM CHLORIDE, SODIUM LACTATE, POTASSIUM CHLORIDE, CALCIUM CHLORIDE 600; 310; 30; 20 MG/100ML; MG/100ML; MG/100ML; MG/100ML
1000 INJECTION, SOLUTION INTRAVENOUS
Status: DISCONTINUED | OUTPATIENT
Start: 2019-06-26 | End: 2019-06-27 | Stop reason: HOSPADM

## 2019-06-26 RX ORDER — OXYCODONE HYDROCHLORIDE 5 MG/1
2.5 TABLET ORAL
Status: DISCONTINUED | OUTPATIENT
Start: 2019-06-26 | End: 2019-06-27

## 2019-06-26 RX ORDER — HALOPERIDOL 5 MG/ML
1 INJECTION INTRAMUSCULAR
Status: DISCONTINUED | OUTPATIENT
Start: 2019-06-26 | End: 2019-06-26 | Stop reason: HOSPADM

## 2019-06-26 RX ADMIN — OMEPRAZOLE 20 MG: 20 CAPSULE, DELAYED RELEASE ORAL at 20:30

## 2019-06-26 RX ADMIN — LORAZEPAM 1 MG: 2 INJECTION INTRAMUSCULAR; INTRAVENOUS at 10:51

## 2019-06-26 RX ADMIN — ALFENTANIL HYDROCHLORIDE 1000 MCG: 500 INJECTION, SOLUTION INTRAVENOUS at 17:31

## 2019-06-26 RX ADMIN — ONDANSETRON 4 MG: 2 INJECTION INTRAMUSCULAR; INTRAVENOUS at 17:47

## 2019-06-26 RX ADMIN — DEXAMETHASONE SODIUM PHOSPHATE 4 MG: 4 INJECTION, SOLUTION INTRAMUSCULAR; INTRAVENOUS at 17:35

## 2019-06-26 RX ADMIN — SODIUM CHLORIDE, POTASSIUM CHLORIDE, SODIUM LACTATE AND CALCIUM CHLORIDE: 600; 310; 30; 20 INJECTION, SOLUTION INTRAVENOUS at 17:30

## 2019-06-26 RX ADMIN — OXYCODONE HYDROCHLORIDE 2.5 MG: 5 TABLET ORAL at 15:49

## 2019-06-26 RX ADMIN — SUCCINYLCHOLINE CHLORIDE 40 MG: 20 INJECTION, SOLUTION INTRAMUSCULAR; INTRAVENOUS at 17:33

## 2019-06-26 RX ADMIN — LORAZEPAM 1 MG: 2 INJECTION INTRAMUSCULAR; INTRAVENOUS at 11:24

## 2019-06-26 RX ADMIN — MORPHINE SULFATE 4 MG: 4 INJECTION INTRAVENOUS at 10:51

## 2019-06-26 RX ADMIN — PROPOFOL 20 MG: 10 INJECTION, EMULSION INTRAVENOUS at 17:31

## 2019-06-26 RX ADMIN — SODIUM CHLORIDE: 9 INJECTION, SOLUTION INTRAVENOUS at 15:51

## 2019-06-26 RX ADMIN — TAMSULOSIN HYDROCHLORIDE 0.4 MG: 0.4 CAPSULE ORAL at 20:30

## 2019-06-26 RX ADMIN — ONDANSETRON 4 MG: 2 INJECTION INTRAMUSCULAR; INTRAVENOUS at 10:51

## 2019-06-26 RX ADMIN — PROPOFOL 100 MG: 10 INJECTION, EMULSION INTRAVENOUS at 17:33

## 2019-06-26 RX ADMIN — ROCURONIUM BROMIDE 5 MG: 10 INJECTION INTRAVENOUS at 17:31

## 2019-06-26 RX ADMIN — KETOROLAC TROMETHAMINE 30 MG: 30 INJECTION, SOLUTION INTRAMUSCULAR at 17:36

## 2019-06-26 RX ADMIN — CEFTRIAXONE SODIUM 2 G: 2 INJECTION, POWDER, FOR SOLUTION INTRAMUSCULAR; INTRAVENOUS at 14:30

## 2019-06-26 ASSESSMENT — ENCOUNTER SYMPTOMS
NAUSEA: 0
ABDOMINAL PAIN: 1
CONSTIPATION: 0
HEADACHES: 1
SHORTNESS OF BREATH: 0
FEVER: 0
COUGH: 0
VOMITING: 0
DIARRHEA: 0
CHILLS: 0
WHEEZING: 0

## 2019-06-26 ASSESSMENT — COGNITIVE AND FUNCTIONAL STATUS - GENERAL
SUGGESTED CMS G CODE MODIFIER MOBILITY: CH
SUGGESTED CMS G CODE MODIFIER DAILY ACTIVITY: CH
MOBILITY SCORE: 24
DAILY ACTIVITIY SCORE: 24

## 2019-06-26 ASSESSMENT — PAIN SCALES - GENERAL: PAIN_LEVEL: 0

## 2019-06-26 ASSESSMENT — PATIENT HEALTH QUESTIONNAIRE - PHQ9
1. LITTLE INTEREST OR PLEASURE IN DOING THINGS: NOT AT ALL
SUM OF ALL RESPONSES TO PHQ9 QUESTIONS 1 AND 2: 0
2. FEELING DOWN, DEPRESSED, IRRITABLE, OR HOPELESS: NOT AT ALL

## 2019-06-26 ASSESSMENT — LIFESTYLE VARIABLES: ALCOHOL_USE: NO

## 2019-06-26 NOTE — ED TRIAGE NOTES
"Chief Complaint   Patient presents with   • Painful Urination     has been treated for UTI x1 week   • Unable to Urinate     x 2 days     Pt reports that he was initially taking cipro. Took for 2 days, urine culture came back positive so they switched him to macrobid. Last 2 days he has been unable to void.   Reports bladder pain 7/10.    /93   Pulse 75   Temp 36.8 °C (98.2 °F) (Temporal)   Resp 18   Ht 1.778 m (5' 10\")   Wt 121.3 kg (267 lb 6.7 oz)   SpO2 95%   "

## 2019-06-26 NOTE — ED NOTES
Med rec completed per pt at bedside   Allergies reviewed    Pt received a 7 day course of CIPRO on 06-18-19. Pt reports that he stopped taking the Cipro and started a 5 day course of MACROBID on 06-23-19.

## 2019-06-26 NOTE — ASSESSMENT & PLAN NOTE
-Seen on CT  -Does have a history of kidney stones  -Urologist Dr. Olivares consulted  -Scheduled for lasertripsy tonight

## 2019-06-26 NOTE — PROGRESS NOTES
Has stone stuck in urethra with retention and recent uti. Will take to OR for laser lithotripsy. May have stricture that needs incision or dilation as well

## 2019-06-26 NOTE — H&P
Hospital Medicine History & Physical Note    Date of Service  6/26/2019    Primary Care Physician  Kathy Boyd M.D.    Consultants  brianda Olivares    Code Status  Full code per patient with SO present    Chief Complaint  Urinary pain and retention    History of Presenting Illness  58 y.o. male w/h/o kidney stones, BPH, UTI who presented 6/26/2019 with urinary pain and retention.  Patient went to urgent care last Thursday and was found to have UTI.  He then went to see urology and was started on Cipro.  This was then found to be resistant on culture and was changed to Macrobid.  Patient then having more more difficulty urinating.  Thus he came over to the hospital.    Review of Systems  Review of Systems   Constitutional: Negative for chills and fever.   Respiratory: Negative for cough, shortness of breath and wheezing.    Cardiovascular: Negative for chest pain.   Gastrointestinal: Positive for abdominal pain. Negative for constipation, diarrhea, nausea and vomiting.   Genitourinary: Positive for dysuria and urgency.   Neurological: Positive for headaches.     Otherwise negative per AMA/CMS criteria    Past Medical History   has a past medical history of BPH (benign prostatic hyperplasia) and Facial basal cell cancer (2007).    Surgical History   has a past surgical history that includes eye surgery; athroplasty; hernia repair; and eye surgery.    Family History  family history includes Cancer (age of onset: 60) in his mother; Heart Disease in his mother.    Social History   reports that he has never smoked. He has never used smokeless tobacco. He reports that he drinks alcohol. He reports that he does not use drugs.    Allergies  No Known Allergies    Medications  No current facility-administered medications on file prior to encounter.      No current outpatient prescriptions on file prior to encounter.       Physical Exam  Weight/BMI: Body mass index is 38.37 kg/m².  /93   Pulse 81   Temp 36.8 °C (98.2 °F)  "(Temporal)   Resp 18   Ht 1.778 m (5' 10\")   Wt 121.3 kg (267 lb 6.7 oz)   SpO2 95%    Vitals:    06/26/19 0958 06/26/19 1124 06/26/19 1213 06/26/19 1344   BP: 145/93      Pulse: 75 78 79 81   Resp: 18      Temp: 36.8 °C (98.2 °F)      TempSrc: Temporal      SpO2: 95% 96% 96% 95%   Weight: 121.3 kg (267 lb 6.7 oz)      Height: 1.778 m (5' 10\")       Oxygen Therapy:  Pulse Oximetry: 95 %  Physical Exam   Constitutional: He is oriented to person, place, and time. He appears well-developed and well-nourished.   HENT:   Head: Normocephalic.   Right Ear: External ear normal.   Left Ear: External ear normal.   Nose: Nose normal.   Eyes: Pupils are equal, round, and reactive to light. Conjunctivae and EOM are normal. Right eye exhibits no discharge. Left eye exhibits no discharge. No scleral icterus.   Neck: Neck supple. No tracheal deviation present.   Cardiovascular: Normal rate.  Exam reveals no gallop and no friction rub.    Pulmonary/Chest: No respiratory distress. He has no wheezes. He has no rales.   Abdominal: Soft. He exhibits no distension. There is tenderness (suprapubic). There is no rebound and no guarding.   Musculoskeletal: He exhibits no edema.   Neurological: He is alert and oriented to person, place, and time.   Skin: Skin is warm and dry. No rash noted. No erythema.   Psychiatric: He has a normal mood and affect.       Laboratory:   Objective   Recent Results (from the past 24 hour(s))   CBC WITH DIFFERENTIAL    Collection Time: 06/26/19 10:42 AM   Result Value Ref Range    WBC 8.3 4.8 - 10.8 K/uL    RBC 5.53 4.70 - 6.10 M/uL    Hemoglobin 17.7 14.0 - 18.0 g/dL    Hematocrit 50.0 42.0 - 52.0 %    MCV 90.4 81.4 - 97.8 fL    MCH 32.0 27.0 - 33.0 pg    MCHC 35.4 (H) 33.7 - 35.3 g/dL    RDW 40.8 35.9 - 50.0 fL    Platelet Count 190 164 - 446 K/uL    MPV 9.6 9.0 - 12.9 fL    Neutrophils-Polys 60.50 44.00 - 72.00 %    Lymphocytes 28.80 22.00 - 41.00 %    Monocytes 8.10 0.00 - 13.40 %    Eosinophils 1.60 " 0.00 - 6.90 %    Basophils 0.60 0.00 - 1.80 %    Immature Granulocytes 0.40 0.00 - 0.90 %    Nucleated RBC 0.00 /100 WBC    Neutrophils (Absolute) 5.03 1.82 - 7.42 K/uL    Lymphs (Absolute) 2.39 1.00 - 4.80 K/uL    Monos (Absolute) 0.67 0.00 - 0.85 K/uL    Eos (Absolute) 0.13 0.00 - 0.51 K/uL    Baso (Absolute) 0.05 0.00 - 0.12 K/uL    Immature Granulocytes (abs) 0.03 0.00 - 0.11 K/uL    NRBC (Absolute) 0.00 K/uL   COMP METABOLIC PANEL    Collection Time: 06/26/19 10:42 AM   Result Value Ref Range    Sodium 136 135 - 145 mmol/L    Potassium 3.9 3.6 - 5.5 mmol/L    Chloride 102 96 - 112 mmol/L    Co2 22 20 - 33 mmol/L    Anion Gap 12.0 (H) 0.0 - 11.9    Glucose 110 (H) 65 - 99 mg/dL    Bun 12 8 - 22 mg/dL    Creatinine 1.09 0.50 - 1.40 mg/dL    Calcium 8.9 8.4 - 10.2 mg/dL    AST(SGOT) 49 (H) 12 - 45 U/L    ALT(SGPT) 66 (H) 2 - 50 U/L    Alkaline Phosphatase 84 30 - 99 U/L    Total Bilirubin 1.6 (H) 0.1 - 1.5 mg/dL    Albumin 4.5 3.2 - 4.9 g/dL    Total Protein 7.4 6.0 - 8.2 g/dL    Globulin 2.9 1.9 - 3.5 g/dL    A-G Ratio 1.6 g/dL   LIPASE    Collection Time: 06/26/19 10:42 AM   Result Value Ref Range    Lipase 24 7 - 58 U/L   ESTIMATED GFR    Collection Time: 06/26/19 10:42 AM   Result Value Ref Range    GFR If African American >60 >60 mL/min/1.73 m 2    GFR If Non African American >60 >60 mL/min/1.73 m 2   URINALYSIS,CULTURE IF INDICATED    Collection Time: 06/26/19 12:00 PM   Result Value Ref Range    Color Yellow     Character Clear     Specific Gravity 1.020 <1.035    Ph 6.0 5.0 - 8.0    Glucose Negative Negative mg/dL    Ketones Trace (A) Negative mg/dL    Protein Negative Negative mg/dL    Bilirubin Negative Negative    Nitrite Negative Negative    Leukocyte Esterase Trace (A) Negative    Occult Blood Large (A) Negative    Micro Urine Req Microscopic    URINE MICROSCOPIC (W/UA)    Collection Time: 06/26/19 12:00 PM   Result Value Ref Range    WBC 20-50 (A) /hpf    RBC  (A) /hpf    Bacteria Few (A) None  /hpf    Epithelial Cells Few Few /hpf    Mucous Threads Few /hpf       (click the triangle to expand results)    Imaging:  CT-RENAL COLIC EVALUATION(A/P W/O)   Final Result      1.  There is a 6 mm calcification which is likely at the junction of the anterior and posterior portions of the urethra.   2.  There are multiple dependent calcified stones or very dense debris in the bladder.   3.  There are no ureter calcifications. There is no hydronephrosis.   4.  There is hepatic geographic fatty infiltration.   5.  Gallbladder is surgically absent with pneumobilia suggesting prior sphincterotomy.          EKG:   per my independant read:  QTc: 433, HR: 112, sinus tach, no ST/T changes    Assessment/Plan:  I anticipate this patient is appropriate for observation status at this time.    Sinus tachycardia- (present on admission)   Assessment & Plan    -Likely due to urinary retention  -Will monitor any need for further work-up after surgery     Urethral stone- (present on admission)   Assessment & Plan    -Seen on CT  -Does have a history of kidney stones  -Urologist Dr. Olivares consulted  -Scheduled for lasertripsy tonight     Abnormal urinalysis- (present on admission)   Assessment & Plan    -Recently treated for UTI  -Also given a dose of ceftriaxone in the ER  -Only a few bacteria in the urinalysis     Dyslipidemia- (present on admission)   Assessment & Plan    -Not on meds outpatient  -Last LDL 47 11/2018     Benign prostatic hyperplasia with urinary retention- (present on admission)   Assessment & Plan    Did recently have UTI possibly contributed from BPH  - Continue tamsulosin         VTE prophylaxis:  SCDs

## 2019-06-26 NOTE — ASSESSMENT & PLAN NOTE
-Recently treated for UTI  -Also given a dose of ceftriaxone in the ER  -Only a few bacteria in the urinalysis

## 2019-06-26 NOTE — ED PROVIDER NOTES
ED Provider Note    CHIEF COMPLAINT  Chief Complaint   Patient presents with   • Painful Urination     has been treated for UTI x1 week   • Unable to Urinate     x 2 days       HPI  Edd Beal is a 58 y.o. male who presents for evaluation of difficulty urinating.  Patient developed dysuria last week and went to an urgent care and was prescribed Cipro for a urinary tract infection.  Patient followed up at urology [place] and was reevaluated.  Patient had a urine culture which came back positive.  Microbiology showed resistance to Cipro and the patient was switched to Macrobid.  The patient has had progressive difficulty urinating over the last 3 days.  Patient does relate a history of nephrolithiasis.  The patient denies: Fever, chills, URI symptoms, cardiorespiratory symptoms, gastrointestinal symptoms.  No other acute symptomatology or complaints.    REVIEW OF SYSTEMS  See HPI for further details.  No history of hypertension, diabetes, thyroid dysfunction, seizures, pulmonary disorders, gastrointestinal disorders.  All other systems negative.    PAST MEDICAL HISTORY  Past Medical History:   Diagnosis Date   • BPH (benign prostatic hyperplasia)    • Facial basal cell cancer 2007    lip, no recurrence       FAMILY HISTORY  Family History   Problem Relation Age of Onset   • Cancer Mother 60        colon cancer   • Heart Disease Mother         CHF       SOCIAL HISTORY  Non-smoker; occasional alcohol use;    SURGICAL HISTORY  Past Surgical History:   Procedure Laterality Date   • ATHROPLASTY      right knee   • EYE SURGERY      cataracts bilateral, corneal transplant   • EYE SURGERY      cataract AND corneal replacement BILATERALLY   • HERNIA REPAIR         CURRENT MEDICATIONS  Home Medications     Reviewed by Corrie Blue R.N. (Registered Nurse) on 06/26/19 at 1001  Med List Status: Partial   Medication Last Dose Status   nitrofurantoin monohyd macro (MACROBID) 100 MG Cap  Active   pantoprazole  "(PROTONIX) 40 MG Tablet Delayed Response  Active   tamsulosin (FLOMAX) 0.4 MG capsule  Active                ALLERGIES  No Known Allergies    PHYSICAL EXAM  VITAL SIGNS: /93   Pulse 75   Temp 36.8 °C (98.2 °F) (Temporal)   Resp 18   Ht 1.778 m (5' 10\")   Wt 121.3 kg (267 lb 6.7 oz)   SpO2 95%   BMI 38.37 kg/m²    Constitutional: 58-year-old male, awake, oriented x3, appears uncomfortable  HENT: ,Atraumatic, Bilateral external ears normal, tympanic membranes clear, Oropharynx mildly dry, No oral exudates, Nose normal.   Eyes: PERRL, EOMI, Conjunctiva normal, No discharge.   Neck: Normal range of motion, No tenderness, Supple, No stridor.   Lymphatic: No lymphadenopathy noted.   Cardiovascular: Normal heart rate, Normal rhythm, No murmurs, No rubs, No gallops.   Thorax & Lungs: Normal Equal breath sounds, No respiratory distress, No wheezing, no stridor, no rales. No chest tenderness.   Abdomen: Soft, nontender, nondistended, no organomegaly, positive bowel sounds normal in quality. No guarding or rebound.  Skin: Good skin turgor, pink, warm, dry. No rashes, petechiae, purpura. Normal capillary refill.   Back: No tenderness, No CVA tenderness.   Genitalia: External genitalia appear normal, No masses or lesions. No discharge. Nontender  Extremities: Intact distal pulses, No edema, No tenderness, No cyanosis, No clubbing. Vascular: Pulses are 2+, symmetric in the upper and lower extremities.  Musculoskeletal: Good range of motion in all major joints. No tenderness to palpation or major deformities noted.   Neurologic: Alert & oriented x 3, Normal motor function, Normal sensory function, No gross focal deficits noted.   Psychiatric: Affect normal, Judgment normal, Mood normal.     RADIOLOGY/PROCEDURES  CT-RENAL COLIC EVALUATION(A/P W/O)   Final Result      1.  There is a 6 mm calcification which is likely at the junction of the anterior and posterior portions of the urethra.   2.  There are multiple " dependent calcified stones or very dense debris in the bladder.   3.  There are no ureter calcifications. There is no hydronephrosis.   4.  There is hepatic geographic fatty infiltration.   5.  Gallbladder is surgically absent with pneumobilia suggesting prior sphincterotomy.            COURSE & MEDICAL DECISION MAKING  Pertinent Labs & Imaging studies reviewed. (See chart for details)  1.  IV: Normal saline; IV fluids administered as the patient is requiring n.p.o. status for several hours;  2.  Ativan, titrated  3.  Morphine, titrate  4.  Zofran, titrated  5.  Ceftriaxone 2 g IV    Laboratory studies: CBC and differential within normal; CMP shows a glucose of 110, AST 49, ALT 66, bilirubin 1.6, otherwise within normal; urinalysis is positive for trace leukocyte esterase, WBCs 20-50, RBCs , bacteria few, epithelial cells few; lipase 24;    Discussion/consultation: At this time, the patient presents here for evaluation of urinary retention.  The patient has 900 cc identified on bladder scanning.  CT scanning was obtained which showed no evidence of ureteral stones however there is a 6 mm calcification which is likely at the junction of the anterior and posterior portions of the urethra.  There are multiple dependent calcified stones were very dense debris in the bladder.  Again, no ureteral calcifications identified no hydronephrosis.  Nursing personnel was unable to pass a Abernathy catheter.  Therefore, I spoke with urology on-call, Dr. Olivares.  He is planning operative intervention.  The patient will be kept n.p.o.  I discussed the findings and treatment plan with the patient and his wife.    FINAL IMPRESSION  1. Urinary retention    2. Urethral stone    3. Acute UTI           PLAN  1.  The patient will be going to the operating room for surgical treatment    Electronically signed by: Guy G Gansert, 6/26/2019 10:30 AM

## 2019-06-26 NOTE — ED NOTES
Pt reports pain manageable at this time. Denies any needs or complaints, declined offer for oral swabs.

## 2019-06-26 NOTE — ANESTHESIA PREPROCEDURE EVALUATION
Relevant Problems   (+) Obstructive sleep apnea syndrome       Physical Exam    Airway   Mallampati: II  TM distance: >3 FB  Neck ROM: full       Cardiovascular - normal exam  Rhythm: regular  Rate: normal     Dental - normal exam         Pulmonary - normal exam  Breath sounds clear to auscultation     Abdominal    Neurological - normal exam                 Anesthesia Plan    ASA 2 - emergent   ASA physical status emergent criteria: other (comment)    Plan - general       Airway plan will be ETT        Induction: intravenous    Postoperative Plan: Postoperative administration of opioids is intended.    Pertinent diagnostic labs and testing reviewed    Informed Consent:    Anesthetic plan and risks discussed with patient.    Use of blood products discussed with: patient whom consented to blood products.

## 2019-06-26 NOTE — ED NOTES
ERP at bedside to discuss CT results and instrucuted to hold off on catheter until he speaks with the urologist due to stone location.

## 2019-06-26 NOTE — ED NOTES
Unable to place pichardo catheter due to too much discomfort  New orders received   Iv placed , pt medicated as directed

## 2019-06-27 ENCOUNTER — PATIENT OUTREACH (OUTPATIENT)
Dept: HEALTH INFORMATION MANAGEMENT | Facility: OTHER | Age: 59
End: 2019-06-27

## 2019-06-27 VITALS
SYSTOLIC BLOOD PRESSURE: 111 MMHG | RESPIRATION RATE: 18 BRPM | DIASTOLIC BLOOD PRESSURE: 67 MMHG | HEART RATE: 82 BPM | WEIGHT: 267.42 LBS | OXYGEN SATURATION: 90 % | BODY MASS INDEX: 38.28 KG/M2 | HEIGHT: 70 IN | TEMPERATURE: 98 F

## 2019-06-27 PROBLEM — N21.1 URETHRAL STONE: Status: RESOLVED | Noted: 2019-06-26 | Resolved: 2019-06-27

## 2019-06-27 PROBLEM — R00.0 SINUS TACHYCARDIA: Status: RESOLVED | Noted: 2019-06-26 | Resolved: 2019-06-27

## 2019-06-27 PROBLEM — R82.90 ABNORMAL URINALYSIS: Status: RESOLVED | Noted: 2019-06-26 | Resolved: 2019-06-27

## 2019-06-27 LAB
ALBUMIN SERPL BCP-MCNC: 3.7 G/DL (ref 3.2–4.9)
BUN SERPL-MCNC: 17 MG/DL (ref 8–22)
CALCIUM SERPL-MCNC: 8.9 MG/DL (ref 8.4–10.2)
CHLORIDE SERPL-SCNC: 105 MMOL/L (ref 96–112)
CO2 SERPL-SCNC: 23 MMOL/L (ref 20–33)
CREAT SERPL-MCNC: 1.07 MG/DL (ref 0.5–1.4)
ERYTHROCYTE [DISTWIDTH] IN BLOOD BY AUTOMATED COUNT: 40.4 FL (ref 35.9–50)
GLUCOSE SERPL-MCNC: 129 MG/DL (ref 65–99)
HCT VFR BLD AUTO: 44.8 % (ref 42–52)
HGB BLD-MCNC: 15.8 G/DL (ref 14–18)
MCH RBC QN AUTO: 32.3 PG (ref 27–33)
MCHC RBC AUTO-ENTMCNC: 35.3 G/DL (ref 33.7–35.3)
MCV RBC AUTO: 91.6 FL (ref 81.4–97.8)
PHOSPHATE SERPL-MCNC: 3.5 MG/DL (ref 2.5–4.5)
PLATELET # BLD AUTO: 183 K/UL (ref 164–446)
PMV BLD AUTO: 9.9 FL (ref 9–12.9)
POTASSIUM SERPL-SCNC: 4.3 MMOL/L (ref 3.6–5.5)
RBC # BLD AUTO: 4.89 M/UL (ref 4.7–6.1)
SODIUM SERPL-SCNC: 136 MMOL/L (ref 135–145)
WBC # BLD AUTO: 11 K/UL (ref 4.8–10.8)

## 2019-06-27 PROCEDURE — 700102 HCHG RX REV CODE 250 W/ 637 OVERRIDE(OP): Performed by: HOSPITALIST

## 2019-06-27 PROCEDURE — G0378 HOSPITAL OBSERVATION PER HR: HCPCS

## 2019-06-27 PROCEDURE — 36415 COLL VENOUS BLD VENIPUNCTURE: CPT

## 2019-06-27 PROCEDURE — 85027 COMPLETE CBC AUTOMATED: CPT

## 2019-06-27 PROCEDURE — A9270 NON-COVERED ITEM OR SERVICE: HCPCS | Performed by: HOSPITALIST

## 2019-06-27 PROCEDURE — 80069 RENAL FUNCTION PANEL: CPT

## 2019-06-27 PROCEDURE — 99217 PR OBSERVATION CARE DISCHARGE: CPT | Performed by: INTERNAL MEDICINE

## 2019-06-27 RX ADMIN — SENNOSIDES AND DOCUSATE SODIUM 2 TABLET: 8.6; 5 TABLET ORAL at 07:46

## 2019-06-27 ASSESSMENT — PATIENT HEALTH QUESTIONNAIRE - PHQ9
SUM OF ALL RESPONSES TO PHQ9 QUESTIONS 1 AND 2: 0
2. FEELING DOWN, DEPRESSED, IRRITABLE, OR HOPELESS: NOT AT ALL
1. LITTLE INTEREST OR PLEASURE IN DOING THINGS: NOT AT ALL

## 2019-06-27 NOTE — ANESTHESIA TIME REPORT
Anesthesia Start and Stop Event Times     Date Time Event    6/26/2019 1730 Anesthesia Start     1759 Anesthesia Stop        Responsible Staff  06/26/19    Name Role Begin End    Bhavesh Nina M.D. Anesth 1730 1759        Preop Diagnosis (Free Text):  Pre-op Diagnosis     uretheral stricture and stones         Preop Diagnosis (Codes):  Diagnosis Information     Diagnosis Code(s):         Post op Diagnosis  Urethra, calculus      Premium Reason  A. 3PM - 7AM    Comments:

## 2019-06-27 NOTE — OR NURSING
1727 Patient allergies and NPO status verified, home medication reconciliation completed and belongings secured. Surgical site verified with patient. Patient verbalizes understanding of pain scale, expected course of stay and plan of care; patient and family state verbal understanding at this time. IV access verified.

## 2019-06-27 NOTE — CARE PLAN
Problem: Safety  Goal: Will remain free from injury  Outcome: PROGRESSING AS EXPECTED  Patient encouraged to call RN when assistance is needed. Safety precautions in place. Call bell within reach.    Problem: Knowledge Deficit  Goal: Knowledge of disease process/condition, treatment plan, diagnostic tests, and medications will improve  Outcome: PROGRESSING AS EXPECTED  Discussed POC including medications and discharge plans    Problem: Pain Management  Goal: Pain level will decrease to patient's comfort goal  Outcome: PROGRESSING AS EXPECTED  Pain medications available as needed. No complaints of pain at this time.

## 2019-06-27 NOTE — PROGRESS NOTES
Report received from NOC RN, assumed care of pt.   POC and medications reviewed with pt. Pt verbalized understanding.   AOx4  Denies pain, SOB, or dizziness at this time.   Safety measures in place.  Hourly rounding in place.     none deferred

## 2019-06-27 NOTE — ANESTHESIA POSTPROCEDURE EVALUATION
Patient: Edd Beal    Procedure Summary     Date:  06/26/19 Room / Location:   OR  / SURGERY [facility]    Anesthesia Start:  1730 Anesthesia Stop:  1759    Procedure:  CYSTOSCOPY- LASER LITHOTRIPSY  FOR URETHERAL STONE. Diagnosis:  (uretheral stricture and stones)    Surgeon:  Dion Olivares M.D. Responsible Provider:  Bhavesh Nina M.D.    Anesthesia Type:  general ASA Status:  2 - Emergent          Final Anesthesia Type: general  Last vitals  BP   Blood Pressure: 125/73, NIBP: 121/76    Temp   36.9 °C (98.4 °F)    Pulse   Pulse: 68   Resp   18    SpO2   97 %      Anesthesia Post Evaluation    Patient location during evaluation: PACU  Patient participation: complete - patient participated  Level of consciousness: awake and alert  Pain score: 0    Airway patency: patent  Anesthetic complications: no  Cardiovascular status: hemodynamically stable  Respiratory status: acceptable  Hydration status: euvolemic    PONV: none           Nurse Pain Score: 6 (NPRS)

## 2019-06-27 NOTE — PROGRESS NOTES
Paged Dr. Olivares/ginette GROVES to verify that pt is OK to d/c today and how long pt needs pichrado catheter for.

## 2019-06-27 NOTE — ANESTHESIA QCDR
2019 [facility] Clinical Data Registry (for Quality Improvement)     Postoperative nausea/vomiting risk protocol (Adult = 18 yrs and Pediatric 3-17 yrs)- (430 and 463)  General inhalation anesthetic (NOT TIVA) with PONV risk factors: No  Provision of anti-emetic therapy with at least 2 different classes of agents: N/A  Patient DID NOT receive anti-emetic therapy and reason is documented in Medical Record: N/A    Multimodal Pain Management- (AQI59)  Patient undergoing Elective Surgery (i.e. Outpatient, or ASC, or Prescheduled Surgery prior to Hospital Admission): No  Use of Multimodal Pain Management, two or more drugs and/or interventions, NOT including systemic opioids: N/A  Exception: Documented allergy to multiple classes of analgesics: N/A    PACU assessment of acute postoperative pain prior to Anesthesia Care End- Applies to Patients Age = 18- (ABG7)  Initial PACU pain score is which of the following: < 7/10  Patient unable to report pain score: N/A    Post-anesthetic transfer of care checklist/protocol to PACU/ICU- (426 and 427)  Upon conclusion of case, patient transferred to which of the following locations: PACU/Non-ICU  Use of transfer checklist/protocol: Yes  Exclusion: Service Performed in Patient Hospital Room (and thus did not require transfer): N/A    PACU Reintubation- (AQI31)  General anesthesia requiring endotracheal intubation (ETT) along with subsequent extubation in OR or PACU: No  Required reintubation in the PACU: N/A  Extubation was a planned trial documented in the medical record prior to removal of the original airway device: N/A    Unplanned admission to ICU related to anesthesia service up through end of PACU care- (MD51)  Unplanned admission to ICU (not initially anticipated at anesthesia start time): No

## 2019-06-27 NOTE — OP REPORT
OPERATIVE NOTE:      Date: 6/26/2019    Patient ID:   Name:             Edd Beal   YOB: 1960  Age:                 58 y.o.  male   MRN:               [id number]                                                                         PRE-OP DIAGNOSIS: urethral stone        POST-OP DIAGNOSIS: 1. Urethral stone  2. Urethral stricture            PROCEDURE: Procedure(s) and Anesthesia Type:     * CYSTOSCOPY- LASER LITHOTRIPSY  FOR URETHERAL STONE.  Direct vision internal urethrotomy            SURGEON: Surgeon(s) and Role:     * Dion Olivares M.D. - Primary            ANESTHESIA: get            ESTIMATED BLOOD LOSS: none            DRAINS: 16f pichardo                  SPECIMENS: stone               COMPLICATIONS: none                   CONDITION: stable            TECHNIQUE: Patient is brought to the operating room and given a general anesthetic.  He is prepped and draped and placed in lithotomy position.  Cystoscopy is carried out.  In the bulbar urethra was noted to be a stricture.  Wire is passed through the stricture into the level of the bladder.  The holmium laser fiber was then used.  The thousand micron size was employed.  Energy level of 1000 mJ at 5 Hz was used to incise the stricture at the 12 o'clock position as well as at the 6 o'clock position.  Incision was taken into the underlying fresh tissue.  Scope was then able to be passed proximally.  The stone that was in the urethra passed into the bladder.  Using the laser fiber once again the stone is fragmented tiny pieces.  There are noted to be numerous other small stones in the bladder is these were then evacuated through the cystoscope.  Reexamining the stricture shows it to be patent.  A 16 English silicone catheter was then passed into the bladder for drainage.  This completes the procedure.

## 2019-06-27 NOTE — OR NURSING
1757 Patient to recovery via cart. Placed on monitors. Patient awakens to verbal stimuli. Denies any pain at this time.  1814 Patient sleeping. Awakens to verbal stimuli.  1820 Patient taking sips of water.  1825 Patients family updated on patients status.  1827 Report called to Jeevan Betancourt on gsu.  1832 Transport called to transfer patient to floor.

## 2019-06-27 NOTE — ANESTHESIA PROCEDURE NOTES
Airway  Date/Time: 6/26/2019 5:34 PM  Performed by: CADEN FENTON  Authorized by: CADEN FENTON     Location:  OR  Urgency:  Elective  Indications for Airway Management:  Anesthesia  Spontaneous Ventilation: absent    Sedation Level:  Deep  Preoxygenated: Yes    Patient Position:  Sniffing  Final Airway Type:  Supraglottic airway  Final Supraglottic Airway:  Standard LMA  SGA Size:  4  Number of Attempts at Approach:  1

## 2019-06-27 NOTE — CONSULTS
DATE OF SERVICE:  06/26/2019    EMERGENCY ROOM CONSULTATION    DIAGNOSIS:  Urinary retention with urethral calculus.    HISTORY OF PRESENT ILLNESS:  Patient is a gentleman who has a prior history of   kidney stones.  He presents now with urinary retention.  CT scan shows a   stone obstructing the urethra.  He has had a recent urinary tract infection   for which he has been on antibiotics.  He denies prior urethral or prostate   surgery.  Denies prior catheterization.  No history of urethral strictures.    PAST MEDICAL HISTORY:  No major illnesses.    PAST SURGICAL HISTORY:  Laser lithotripsy arthroscopy, eye surgery.    MEDICATIONS:  Flomax.    PHYSICAL EXAMINATION:  GENERAL:  He is awake, alert.  He is in no distress.  LUNGS:  Clear.  CARDIAC:  Regular rate and rhythm.  ABDOMEN:  Soft, nontender, no masses.  GENITOURINARY:  Penis is normal.  Testicles are normal.  Prostate exam   deferred.    LABORATORY DATA:  Creatinine 1.09.  Urinalysis, large blood, nitrite negative.    IMAGING STUDIES:  CT scan with urethral calculus as well as some   nonobstructing stones in the kidney and bladder.    ASSESSMENT:  Obstructing urethral calculus with urinary retention.    PLAN:  Will be for cystoscopy, dilation or incision of any strictures that   might be holding up the stone, and a laser lithotripsy with removal of the   stone fragments.       ____________________________________     MD PRAKASH Kraus / SURJIT    DD:  06/26/2019 17:22:25  DT:  06/26/2019 17:43:52    D#:  [id number]  Job#:  [id number]

## 2019-06-27 NOTE — PROGRESS NOTES
Urology paged back to say OK to d/c today with kylee. Abernathy to stay in until Monday. Urology to call pt Monday morning to make an appointment.

## 2019-06-27 NOTE — DISCHARGE INSTRUCTIONS
Discharge Instructions per Aracelis Wright M.D.    Follow up with urology Dr. Olivares Monday 7/1 for pichardo catheter removal    DIET: regular     ACTIVITY: as tolerated, no restrictions    DIAGNOSIS: uretheral stricture and stone    Return to ER if pain or urinary difficulty return    Discharge Instructions    Discharged to home by car with relative. Discharged via wheelchair, hospital escort: Yes.  Special equipment needed: Not Applicable    Be sure to schedule a follow-up appointment with your primary care doctor or any specialists as instructed.     Discharge Plan:   Influenza Vaccine Indication: Patient Refuses    I understand that a diet low in cholesterol, fat, and sodium is recommended for good health. Unless I have been given specific instructions below for another diet, I accept this instruction as my diet prescription.   Other diet: regular    Special Instructions: None    · Is patient discharged on Warfarin / Coumadin?   No     Depression / Suicide Risk    As you are discharged from this [facility] facility, it is important to learn how to keep safe from harming yourself.    Recognize the warning signs:  · Abrupt changes in personality, positive or negative- including increase in energy   · Giving away possessions  · Change in eating patterns- significant weight changes-  positive or negative  · Change in sleeping patterns- unable to sleep or sleeping all the time   · Unwillingness or inability to communicate  · Depression  · Unusual sadness, discouragement and loneliness  · Talk of wanting to die  · Neglect of personal appearance   · Rebelliousness- reckless behavior  · Withdrawal from people/activities they love  · Confusion- inability to concentrate     If you or a loved one observes any of these behaviors or has concerns about self-harm, here's what you can do:  · Talk about it- your feelings and reasons for harming yourself  · Remove any means that you might use to hurt yourself (examples: pills,  rope, extension cords, firearm)  · Get professional help from the community (Mental Health, Substance Abuse, psychological counseling)  · Do not be alone:Call your Safe Contact- someone whom you trust who will be there for you.  · Call your local CRISIS HOTLINE 905-5191 or 137-783-1060  · Call your local Children's Mobile Crisis Response Team [place] (768) 523-6895 or www.BombBomb  · Call the toll free National Suicide Prevention Hotlines   · National Suicide Prevention Lifeline 864-109-ZOFR (6004)  · National Hope Line Network 800-SUICIDE (457-3323)

## 2019-06-27 NOTE — PROGRESS NOTES
Pt discharged per MD orders.   Pt meeting discharge criteria.   VSS.   Pain controlled.   IV removed.   Discharge instructions and prescriptions reviewed with pt. Pt verbalized understanding.

## 2019-06-27 NOTE — DISCHARGE SUMMARY
Discharge Summary    CHIEF COMPLAINT ON ADMISSION  Chief Complaint   Patient presents with   • Painful Urination     has been treated for UTI x1 week   • Unable to Urinate     x 2 days       Reason for Admission  Difficulty Urinating     Admission Date  6/26/2019    CODE STATUS  Full Code    HPI & HOSPITAL COURSE  This is a 58 y.o. male here with a prior history of   kidney stones.  He presents now with urinary retention.  CT scan shows a stone obstructing the urethra. He had lithotripsy and was found to have urethral stricture so dilation was done with Dr. Olivares. Pain was well controlled with no need for medication.       Therefore, he is discharged in good and stable condition to home with close outpatient follow-up.    Discharge Date  6/27/2019    FOLLOW UP ITEMS POST DISCHARGE  Urology as needed    DISCHARGE DIAGNOSES  Active Problems:    Benign prostatic hyperplasia with urinary retention POA: Yes    Dyslipidemia POA: Yes  Resolved Problems:    Abnormal urinalysis POA: Yes    Urethral stone POA: Yes    Sinus tachycardia POA: Yes      FOLLOW UP  No future appointments.  No follow-up provider specified.    MEDICATIONS ON DISCHARGE     Medication List      CONTINUE taking these medications      Instructions   ciprofloxacin 500 MG Tabs  Commonly known as:  CIPRO   Take 500 mg by mouth 2 times a day. 7 day course  Dose:  500 mg     nitrofurantoin monohyd macro 100 MG Caps  Commonly known as:  MACROBID   Take 100 mg by mouth 2 times a day. 5 day course  Dose:  100 mg     pantoprazole 40 MG Tbec  Commonly known as:  PROTONIX   Take 40 mg by mouth every evening.  Dose:  40 mg     tamsulosin 0.4 MG capsule  Commonly known as:  FLOMAX   Take 0.4 mg by mouth every evening.  Dose:  0.4 mg            Allergies  No Known Allergies    DIET  Orders Placed This Encounter   Procedures   • Diet Order Regular     Standing Status:   Standing     Number of Occurrences:   1     Order Specific Question:   Diet:     Answer:    Regular [1]       ACTIVITY  As tolerated.  Weight bearing as tolerated    CONSULTATIONS  Urology Dr. Olivares    PROCEDURES  lithotripsy    LABORATORY  Lab Results   Component Value Date    SODIUM 136 06/27/2019    POTASSIUM 4.3 06/27/2019    CHLORIDE 105 06/27/2019    CO2 23 06/27/2019    GLUCOSE 129 (H) 06/27/2019    BUN 17 06/27/2019    CREATININE 1.07 06/27/2019        Lab Results   Component Value Date    WBC 11.0 (H) 06/27/2019    HEMOGLOBIN 15.8 06/27/2019    HEMATOCRIT 44.8 06/27/2019    PLATELETCT 183 06/27/2019        Total time of the discharge process exceeds 20 minutes.

## 2019-06-28 LAB
BACTERIA UR CULT: NORMAL
SIGNIFICANT IND 70042: NORMAL
SITE SITE: NORMAL
SOURCE SOURCE: NORMAL

## 2019-07-01 LAB
APPEARANCE STONE: NORMAL
COMPN STONE: NORMAL
NUMBER STONE: NORMAL
SIZE STONE: NORMAL MM
SPECIMEN WT: 30 MG

## 2019-07-12 ENCOUNTER — HOSPITAL ENCOUNTER (OUTPATIENT)
Dept: LAB | Facility: MEDICAL CENTER | Age: 59
End: 2019-07-12
Attending: PHYSICIAN ASSISTANT
Payer: COMMERCIAL

## 2019-07-12 LAB
BODY FLD TYPE: NORMAL
CALCIUM SERPL-MCNC: 9.9 MG/DL (ref 8.5–10.5)
COLLECT DURATION TIME SPEC: 24 HRS
CREAT 24H UR-MSRATE: 2761 MG/24 HR (ref 1000–2000)
CREAT UR-MCNC: 197.2 MG/DL
PH FLD: 6.4 [PH]
PTH-INTACT SERPL-MCNC: 36.1 PG/ML (ref 14–72)
SPECIMEN VOL UR: 1400 ML
URATE SERPL-MCNC: 5.4 MG/DL (ref 2.5–8.3)

## 2019-07-12 PROCEDURE — 36415 COLL VENOUS BLD VENIPUNCTURE: CPT

## 2019-07-12 PROCEDURE — 83945 ASSAY OF OXALATE: CPT

## 2019-07-12 PROCEDURE — 84550 ASSAY OF BLOOD/URIC ACID: CPT

## 2019-07-12 PROCEDURE — 82570 ASSAY OF URINE CREATININE: CPT

## 2019-07-12 PROCEDURE — 82340 ASSAY OF CALCIUM IN URINE: CPT

## 2019-07-12 PROCEDURE — 84300 ASSAY OF URINE SODIUM: CPT

## 2019-07-12 PROCEDURE — 81050 URINALYSIS VOLUME MEASURE: CPT

## 2019-07-12 PROCEDURE — 82507 ASSAY OF CITRATE: CPT

## 2019-07-12 PROCEDURE — 83970 ASSAY OF PARATHORMONE: CPT

## 2019-07-12 PROCEDURE — 84560 ASSAY OF URINE/URIC ACID: CPT

## 2019-07-12 PROCEDURE — 83986 ASSAY PH BODY FLUID NOS: CPT

## 2019-07-12 PROCEDURE — 82131 AMINO ACIDS SINGLE QUANT: CPT

## 2019-07-14 LAB
CALCIUM 24H UR-MCNC: 24.6 MG/DL
CALCIUM 24H UR-MRATE: 344 MG/D
CALCIUM/CREAT 24H UR: 153 MG/G (ref 20–240)
CITRATE 24H UR-MCNC: 1031 MG/L
CITRATE 24H UR-MRATE: 1443 MG/D (ref 320–1240)
COLLECT DURATION TIME SPEC: 24 HRS
CREAT 24H UR-MCNC: 161 MG/DL
CREAT 24H UR-MRATE: 2254 MG/D (ref 800–2100)
OXALATE 24H UR-MCNC: 21 MG/L
OXALATE 24H UR-MRATE: 29 MG/D (ref 16–49)
SODIUM 24H UR-SCNC: 125 MMOL/L
SODIUM 24H UR-SRATE: 175 MMOL/D (ref 51–286)
SPECIMEN VOL ?TM UR: 1400 ML
URATE 24H UR-MCNC: 61.9 MG/DL
URATE 24H UR-MRATE: 867 MG/D (ref 250–750)

## 2019-07-17 LAB
COLLECT DURATION TIME SPEC: 24 HRS
CREAT UR-MCNC: 187 MG/DL
CYSTINE 24H UR-MCNC: 1.48 MG/DL
CYSTINE 24H UR-MRATE: 21 MG/D
CYSTINE/CREAT 24H UR-RTO: 33 UMOL/G CRT (ref 12–81)
TOTAL VOLUME 1105: 1400 ML

## 2019-08-20 ENCOUNTER — APPOINTMENT (OUTPATIENT)
Dept: RADIOLOGY | Facility: MEDICAL CENTER | Age: 59
End: 2019-08-20
Attending: EMERGENCY MEDICINE
Payer: COMMERCIAL

## 2019-08-20 ENCOUNTER — HOSPITAL ENCOUNTER (EMERGENCY)
Facility: MEDICAL CENTER | Age: 59
End: 2019-08-21
Attending: EMERGENCY MEDICINE
Payer: COMMERCIAL

## 2019-08-20 DIAGNOSIS — R07.89 OTHER CHEST PAIN: ICD-10-CM

## 2019-08-20 DIAGNOSIS — N39.0 ACUTE UTI: ICD-10-CM

## 2019-08-20 LAB
ALBUMIN SERPL BCP-MCNC: 4.3 G/DL (ref 3.2–4.9)
ALBUMIN/GLOB SERPL: 1.7 G/DL
ALP SERPL-CCNC: 73 U/L (ref 30–99)
ALT SERPL-CCNC: 48 U/L (ref 2–50)
ANION GAP SERPL CALC-SCNC: 12 MMOL/L (ref 0–11.9)
AST SERPL-CCNC: 29 U/L (ref 12–45)
BILIRUB SERPL-MCNC: 2 MG/DL (ref 0.1–1.5)
BUN SERPL-MCNC: 12 MG/DL (ref 8–22)
CALCIUM SERPL-MCNC: 9 MG/DL (ref 8.4–10.2)
CHLORIDE SERPL-SCNC: 99 MMOL/L (ref 96–112)
CO2 SERPL-SCNC: 20 MMOL/L (ref 20–33)
CREAT SERPL-MCNC: 0.96 MG/DL (ref 0.5–1.4)
EKG IMPRESSION: NORMAL
GLOBULIN SER CALC-MCNC: 2.6 G/DL (ref 1.9–3.5)
GLUCOSE SERPL-MCNC: 141 MG/DL (ref 65–99)
POTASSIUM SERPL-SCNC: 3.3 MMOL/L (ref 3.6–5.5)
PROT SERPL-MCNC: 6.9 G/DL (ref 6–8.2)
SODIUM SERPL-SCNC: 131 MMOL/L (ref 135–145)
TROPONIN T SERPL-MCNC: 17 NG/L (ref 6–19)

## 2019-08-20 PROCEDURE — 93005 ELECTROCARDIOGRAM TRACING: CPT

## 2019-08-20 PROCEDURE — A9270 NON-COVERED ITEM OR SERVICE: HCPCS | Performed by: EMERGENCY MEDICINE

## 2019-08-20 PROCEDURE — 80053 COMPREHEN METABOLIC PANEL: CPT

## 2019-08-20 PROCEDURE — 87077 CULTURE AEROBIC IDENTIFY: CPT

## 2019-08-20 PROCEDURE — 85379 FIBRIN DEGRADATION QUANT: CPT

## 2019-08-20 PROCEDURE — 71045 X-RAY EXAM CHEST 1 VIEW: CPT

## 2019-08-20 PROCEDURE — 700102 HCHG RX REV CODE 250 W/ 637 OVERRIDE(OP): Performed by: EMERGENCY MEDICINE

## 2019-08-20 PROCEDURE — 81001 URINALYSIS AUTO W/SCOPE: CPT

## 2019-08-20 PROCEDURE — 87186 SC STD MICRODIL/AGAR DIL: CPT

## 2019-08-20 PROCEDURE — 99285 EMERGENCY DEPT VISIT HI MDM: CPT

## 2019-08-20 PROCEDURE — 87086 URINE CULTURE/COLONY COUNT: CPT

## 2019-08-20 PROCEDURE — 84484 ASSAY OF TROPONIN QUANT: CPT

## 2019-08-20 RX ORDER — ASPIRIN 81 MG/1
324 TABLET, CHEWABLE ORAL ONCE
Status: COMPLETED | OUTPATIENT
Start: 2019-08-20 | End: 2019-08-20

## 2019-08-20 RX ADMIN — ASPIRIN 81 MG CHEWABLE TABLET 324 MG: 81 TABLET CHEWABLE at 23:05

## 2019-08-20 NOTE — LETTER
8/23/2019               Edd Jace Emigdio  [id number] [facility] [address]        Dear Edd (MR#[id number])    As we have been unable to contact you by phone, this letter is sent in regards to your recent visit to the [facility] Emergency Department on 8/20/2019.  During the visit, tests were performed to assist the physician in a medical diagnosis.  A review of those tests requires that we notify you of the following:    Your urine culture and sensitivity was POSITIVE for a bacteria called Enterococcus faecalis and Enterococcus faecium, and further treatment may be necessary. The currently prescribed antibiotic (ciprofloxacin) may NOT be effective in treating your infection. IF YOU ARE NOT FEELING BETTER PLEASE CONTACT ME AS SOON AS POSSIBLE AT THE NUMBER BELOW.       Thank you for your cooperation in the matter.    Sincerely,  ED Culture Follow-Up Staff  Gloria Eduardo, PharmD    [facility], Emergency Department  [address], [place] [address]  [phone] (ED Culture Line)  [phone]

## 2019-08-21 ENCOUNTER — APPOINTMENT (OUTPATIENT)
Dept: RADIOLOGY | Facility: MEDICAL CENTER | Age: 59
End: 2019-08-21
Attending: EMERGENCY MEDICINE
Payer: COMMERCIAL

## 2019-08-21 VITALS
OXYGEN SATURATION: 96 % | DIASTOLIC BLOOD PRESSURE: 66 MMHG | TEMPERATURE: 97.4 F | RESPIRATION RATE: 16 BRPM | WEIGHT: 255 LBS | HEART RATE: 64 BPM | HEIGHT: 70 IN | SYSTOLIC BLOOD PRESSURE: 113 MMHG | BODY MASS INDEX: 36.51 KG/M2

## 2019-08-21 LAB
APPEARANCE UR: ABNORMAL
BACTERIA #/AREA URNS HPF: ABNORMAL /HPF
BILIRUB UR QL STRIP.AUTO: NEGATIVE
COLOR UR: YELLOW
D DIMER PPP IA.FEU-MCNC: 0.86 UG/ML (FEU) (ref 0–0.5)
EPI CELLS #/AREA URNS HPF: ABNORMAL /HPF
GLUCOSE UR STRIP.AUTO-MCNC: NEGATIVE MG/DL
KETONES UR STRIP.AUTO-MCNC: NEGATIVE MG/DL
LEUKOCYTE ESTERASE UR QL STRIP.AUTO: ABNORMAL
MICRO URNS: ABNORMAL
MUCOUS THREADS #/AREA URNS HPF: ABNORMAL /HPF
NITRITE UR QL STRIP.AUTO: NEGATIVE
PH UR STRIP.AUTO: 6.5 [PH] (ref 5–8)
PROT UR QL STRIP: NEGATIVE MG/DL
RBC # URNS HPF: ABNORMAL /HPF
RBC UR QL AUTO: NEGATIVE
SP GR UR STRIP.AUTO: <=1.005
TROPONIN T SERPL-MCNC: 14 NG/L (ref 6–19)
WBC #/AREA URNS HPF: ABNORMAL /HPF

## 2019-08-21 PROCEDURE — 84484 ASSAY OF TROPONIN QUANT: CPT

## 2019-08-21 PROCEDURE — 700117 HCHG RX CONTRAST REV CODE 255: Performed by: EMERGENCY MEDICINE

## 2019-08-21 PROCEDURE — 36415 COLL VENOUS BLD VENIPUNCTURE: CPT

## 2019-08-21 PROCEDURE — 71275 CT ANGIOGRAPHY CHEST: CPT

## 2019-08-21 RX ORDER — CIPROFLOXACIN 500 MG/1
500 TABLET, FILM COATED ORAL 2 TIMES DAILY
Qty: 10 TAB | Refills: 0 | Status: SHIPPED | OUTPATIENT
Start: 2019-08-21 | End: 2019-08-26

## 2019-08-21 RX ADMIN — IOHEXOL 100 ML: 350 INJECTION, SOLUTION INTRAVENOUS at 01:42

## 2019-08-21 NOTE — ED TRIAGE NOTES
Patient BiB wife for episodic CP and increasing malaise since Thursday. He had 1 episode of vomiting.

## 2019-08-21 NOTE — ED NOTES
MD re-evaluated pt and informed him of POC  2nd IV was started L AC for CT of chest  Tolerated procedure well

## 2019-08-21 NOTE — ED PROVIDER NOTES
"ED Provider Note    CHIEF COMPLAINT  Chief Complaint   Patient presents with   • Chest Pain   • Vomiting       HPI  Edd Beal is a 58 y.o. male who presents to the emerge department complaining of right-sided chest pain.  Patient's pain has been intermittent over the last 4 to 5 days.  Prior history of hypertension, hypercholesterolemia, BPH.  Significant for prior family cardiac history.  He is a non-smoker.  Retired  from [place].  He has had associated nausea, vomiting but no diaphoresis with these flares.  He notes that the pain is intermittent and sharp.  No notable aggravating relieving with breathing.  No abdominal pain although he does have history of prior kidney stones and biliary tree stone status post cholecystectomy in years past.  However he notes that this pain is been completely different than those from his prior stones of other pathology.  Currently by enlarge a symptom medic other than an intermittent \"twinge\" of pain.    REVIEW OF SYSTEMS  See HPI for further details. All other systems are negative.     PAST MEDICAL HISTORY   has a past medical history of BPH (benign prostatic hyperplasia), Facial basal cell cancer (2007), and HLD (hyperlipidemia).    SOCIAL HISTORY  Social History     Tobacco Use   • Smoking status: Never Smoker   • Smokeless tobacco: Never Used   Substance and Sexual Activity   • Alcohol use: Yes     Comment: occa   • Drug use: No   • Sexual activity: Not on file       SURGICAL HISTORY   has a past surgical history that includes eye surgery; athroplasty; hernia repair; eye surgery; and cystoscopy,insert ureteral stent (6/26/2019).    CURRENT MEDICATIONS  Home Medications    **Home medications have not yet been reviewed for this encounter**         ALLERGIES  No Known Allergies    PHYSICAL EXAM  VITAL SIGNS: /66   Pulse 64   Temp 36.3 °C (97.4 °F) (Temporal)   Resp 16   Ht 1.778 m (5' 10\")   Wt 115.7 kg (255 lb)   SpO2 96%   BMI 36.59 kg/m²  " @[facility][[id number]::@   Pulse ox interpretation: I interpret this pulse ox as normal.  Constitutional: Alert in no apparent distress.  HENT: No signs of trauma, Bilateral external ears normal, Nose normal.   Eyes: Pupils are equal and reactive, Conjunctiva normal, Non-icteric.   Neck: Normal range of motion, No tenderness, Supple, No stridor.   Cardiovascular: Regular rate and rhythm, no murmurs.   Thorax & Lungs: Normal breath sounds, No respiratory distress, No wheezing, No chest tenderness.   Abdomen: Bowel sounds normal, Soft, No tenderness, No masses, No pulsatile masses. No peritoneal signs.  Skin: Warm, Dry, No erythema, No rash.   Back: No bony tenderness, No CVA tenderness.   Extremities: Intact distal pulses, No edema, No tenderness, No cyanosis,  Negative Fiona's sign.   Musculoskeletal: Good range of motion in all major joints. No tenderness to palpation or major deformities noted.   Neurologic: Alert , Normal motor function, Normal sensory function, No focal deficits noted.   Psychiatric: Affect normal, Judgment normal, Mood normal.       DIAGNOSTIC STUDIES / PROCEDURES    EKG  Sinus rhythm at a rate of 95, left axis deviation, no acute ST changes    LABS  Results for orders placed or performed during the hospital encounter of 08/20/19   Complete Metabolic Panel (CMP)   Result Value Ref Range    Sodium 131 (L) 135 - 145 mmol/L    Potassium 3.3 (L) 3.6 - 5.5 mmol/L    Chloride 99 96 - 112 mmol/L    Co2 20 20 - 33 mmol/L    Anion Gap 12.0 (H) 0.0 - 11.9    Glucose 141 (H) 65 - 99 mg/dL    Bun 12 8 - 22 mg/dL    Creatinine 0.96 0.50 - 1.40 mg/dL    Calcium 9.0 8.4 - 10.2 mg/dL    AST(SGOT) 29 12 - 45 U/L    ALT(SGPT) 48 2 - 50 U/L    Alkaline Phosphatase 73 30 - 99 U/L    Total Bilirubin 2.0 (H) 0.1 - 1.5 mg/dL    Albumin 4.3 3.2 - 4.9 g/dL    Total Protein 6.9 6.0 - 8.2 g/dL    Globulin 2.6 1.9 - 3.5 g/dL    A-G Ratio 1.7 g/dL   Troponin   Result Value Ref Range    Troponin T 17 6 - 19 ng/L   ESTIMATED GFR    Result Value Ref Range    GFR If African American >60 >60 mL/min/1.73 m 2    GFR If Non African American >60 >60 mL/min/1.73 m 2   D-Dimer (only helpful in low pre-test probability wells critieria. Do not order if patient ruled out by PERC criteria. See Weblinks at top of Labs section)   Result Value Ref Range    D-Dimer Screen 0.86 (H) 0.00 - 0.50 ug/mL (FEU)   Troponin in two (2) hours   Result Value Ref Range    Troponin T 14 6 - 19 ng/L   Urinalysis, culture if indicated   Result Value Ref Range    Color Yellow     Character Hazy (A)     Specific Gravity <=1.005 <1.035    Ph 6.5 5.0 - 8.0    Glucose Negative Negative mg/dL    Ketones Negative Negative mg/dL    Protein Negative Negative mg/dL    Bilirubin Negative Negative    Nitrite Negative Negative    Leukocyte Esterase Small (A) Negative    Occult Blood Negative Negative    Micro Urine Req Microscopic    URINE MICROSCOPIC (W/UA)   Result Value Ref Range    WBC 20-50 (A) /hpf    RBC 2-5 (A) /hpf    Bacteria Moderate (A) None /hpf    Epithelial Cells Few Few /hpf    Mucous Threads Few /hpf   EKG   Result Value Ref Range    Report       [facility] Emergency [facility].    Test Date:  2019  Pt Name:    CHICA VENTURA              Department: [facility]  MRN:        [id number]                      Room:  Gender:     Male                         Technician: HRR  :        1960                   Requested By:ER TRIAGE PROTOCOL  Order #:    [id number]                    Reading MD: Raz Teixeira    Measurements  Intervals                                Axis  Rate:       95                           P:          80  ND:         179                          QRS:        -35  QRSD:       89                           T:          51  QT:         338  QTc:        425    Interpretive Statements  Sinus rhythm  Left axis deviation  No previous ECG available for comparison    Electronically Signed On 2019 22:48:02 PDT by Raz Teixeira            RADIOLOGY  CT-CTA CHEST PULMONARY ARTERY W/ RECONS   Final Result      No evidence of pulmonary embolus.      Coronary artery calcifications.            DX-CHEST-LIMITED (1 VIEW)   Final Result         No acute cardiac or pulmonary abnormality is identified.            COURSE & MEDICAL DECISION MAKING  Pertinent Labs & Imaging studies reviewed. (See chart for details)  58-year-old male presented to the emerge department with chest pain.  History as above.  Heart score is 4.  Aspirin provided for additional comfort.  Delta troponin is negative.  Slight scant evidence of possible urinary tract infection.  This is historically also been found.  He will be started back on Cipro for this.  I have recommended inpatient stay given his heart score of 4 and current presentation over the patient wishes declined and follow-up with his outpatient follow-up as referred.  He is understanding of strict return precautions and again will call cardiology office in the morning for outpatient scheduling.    The patient will return for worsening symptoms and is stable at the time of discharge. The patient verbalizes understanding and will comply.    FINAL IMPRESSION  1. Other chest pain    2. Acute UTI            Electronically signed by: Raz Teixeira, 8/20/2019 11:07 PM

## 2019-08-21 NOTE — ED PROVIDER NOTES
11:18 AM  Patient returned to the ER after losing his printed Cipro prescription from yesterday.  I rewrote Cipro 500 mg 1 p.o. twice daily for 7 days #14 without refills per the ERP note from yesterday.

## 2019-08-22 ENCOUNTER — OFFICE VISIT (OUTPATIENT)
Dept: CARDIOLOGY | Facility: MEDICAL CENTER | Age: 59
End: 2019-08-22
Payer: COMMERCIAL

## 2019-08-22 VITALS
DIASTOLIC BLOOD PRESSURE: 80 MMHG | WEIGHT: 263 LBS | HEART RATE: 66 BPM | SYSTOLIC BLOOD PRESSURE: 132 MMHG | HEIGHT: 70 IN | BODY MASS INDEX: 37.65 KG/M2 | OXYGEN SATURATION: 96 %

## 2019-08-22 DIAGNOSIS — Z91.89 OTHER SPECIFIED PERSONAL RISK FACTORS, NOT ELSEWHERE CLASSIFIED: ICD-10-CM

## 2019-08-22 DIAGNOSIS — E78.6 ABNORMALLY LOW HIGH DENSITY LIPOPROTEIN (HDL) CHOLESTEROL WITH HYPERTRIGLYCERIDEMIA: ICD-10-CM

## 2019-08-22 DIAGNOSIS — R07.1 CHEST PAIN ON BREATHING: ICD-10-CM

## 2019-08-22 DIAGNOSIS — E78.1 ABNORMALLY LOW HIGH DENSITY LIPOPROTEIN (HDL) CHOLESTEROL WITH HYPERTRIGLYCERIDEMIA: ICD-10-CM

## 2019-08-22 PROCEDURE — 99203 OFFICE O/P NEW LOW 30 MIN: CPT | Performed by: INTERNAL MEDICINE

## 2019-08-22 RX ORDER — CHLORAL HYDRATE 500 MG
1000 CAPSULE ORAL
COMMUNITY
End: 2024-01-11

## 2019-08-22 RX ORDER — MULTIVIT WITH MINERALS/LUTEIN
1000 TABLET ORAL
COMMUNITY

## 2019-08-22 ASSESSMENT — ENCOUNTER SYMPTOMS
DEPRESSION: 0
PALPITATIONS: 0
MUSCULOSKELETAL NEGATIVE: 1
RESPIRATORY NEGATIVE: 1
NEUROLOGICAL NEGATIVE: 1
CONSTITUTIONAL NEGATIVE: 1
GASTROINTESTINAL NEGATIVE: 1

## 2019-08-22 NOTE — PROGRESS NOTES
Chief Complaint   Patient presents with   • Chest Pain       Subjective:   Edd Beal is a 58 y.o. gentleman seen in consultation at the request of Dr. Raz Teixeira for evaluation of his cardiac status.  The patient has a history of localized left lower chest pain that is pleuritic.  This started about 2 weeks ago per the pain did not radiate to his neck jaw or across his chest.  He then developed similar localized pain in the right side of his chest and was seen in the emergency room on .  Also at that time he was feeling quite ill and had a fever as well as rigors he was found to have a urinary tract infection.  Troponin was negative x2.  The patient has a history of mixed hyperlipidemia with high triglyceride low HDL.  Risk factor profile also positive for family history of heart disease.  The patient is a non-smoker and does not have hypertension.  He is not diabetic.  He has history of sleep apnea and is compliant with his mask.  He was also found to have extrasystoles according to the patient while he was in the ER but this is not documented    Patient has a history of sleep apnea and is compliant with his mask.    Family history: Father had a heart attack at 54.  Mother  at 67 from complications of diabetes and heart failure.    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    Past Medical History:   Diagnosis Date   • BPH (benign prostatic hyperplasia)    • Facial basal cell cancer     lip, no recurrence   • HLD (hyperlipidemia)      Past Surgical History:   Procedure Laterality Date   • PB CYSTOSCOPY,INSERT URETERAL STENT  2019    Procedure: CYSTOSCOPY- LASER LITHOTRIPSY  FOR URETHERAL STONE.;  Surgeon: Dion Olivares M.D.;  Location: SURGERY [facility];  Service: Urology   • ATHROPLASTY      right knee   • EYE SURGERY      cataracts bilateral, corneal transplant   • EYE SURGERY      cataract AND corneal  replacement BILATERALLY   • HERNIA REPAIR       Family History   Problem Relation Age of Onset   • Cancer Mother 60        colon cancer   • Heart Disease Mother         CHF     Social History     Socioeconomic History   • Marital status:      Spouse name: Not on file   • Number of children: Not on file   • Years of education: Not on file   • Highest education level: Not on file   Occupational History   • Not on file   Social Needs   • Financial resource strain: Not on file   • Food insecurity:     Worry: Not on file     Inability: Not on file   • Transportation needs:     Medical: Not on file     Non-medical: Not on file   Tobacco Use   • Smoking status: Never Smoker   • Smokeless tobacco: Never Used   Substance and Sexual Activity   • Alcohol use: Yes     Comment: occa   • Drug use: No   • Sexual activity: Not on file   Lifestyle   • Physical activity:     Days per week: Not on file     Minutes per session: Not on file   • Stress: Not on file   Relationships   • Social connections:     Talks on phone: Not on file     Gets together: Not on file     Attends Jewish service: Not on file     Active member of club or organization: Not on file     Attends meetings of clubs or organizations: Not on file     Relationship status: Not on file   • Intimate partner violence:     Fear of current or ex partner: Not on file     Emotionally abused: Not on file     Physically abused: Not on file     Forced sexual activity: Not on file   Other Topics Concern   • Not on file   Social History Narrative   • Not on file     No Known Allergies  Outpatient Encounter Medications as of 8/22/2019   Medication Sig Dispense Refill   • aspirin EC (ECOTRIN) 81 MG Tablet Delayed Response Take 81 mg by mouth every day.     • Ascorbic Acid (VITAMIN C) 1000 MG Tab Take  by mouth.     • Omega-3 Fatty Acids (FISH OIL) 1000 MG Cap capsule Take 1,000 mg by mouth 3 times a day, with meals.     • ciprofloxacin (CIPRO) 500 MG Tab Take 1 Tab by  "mouth 2 times a day for 5 days. 10 Tab 0   • pantoprazole (PROTONIX) 40 MG Tablet Delayed Response Take 40 mg by mouth every evening.     • tamsulosin (FLOMAX) 0.4 MG capsule Take 0.4 mg by mouth every evening.     • ciprofloxacin (CIPRO) 500 MG Tab Take 500 mg by mouth 2 times a day. 7 day course     • nitrofurantoin monohyd macro (MACROBID) 100 MG Cap Take 100 mg by mouth 2 times a day. 5 day course       No facility-administered encounter medications on file as of 8/22/2019.      Review of Systems   Constitutional: Negative.         Recent weight gain over the last 6 months   HENT: Negative.    Respiratory: Negative.         History of sleep apnea   Cardiovascular: Positive for chest pain. Negative for palpitations.   Gastrointestinal: Negative.    Musculoskeletal: Negative.    Skin: Negative.    Neurological: Negative.    Endo/Heme/Allergies: Negative.    Psychiatric/Behavioral: Negative for depression.        Objective:   /80 (BP Location: Left arm, Patient Position: Sitting, BP Cuff Size: Adult)   Pulse 66   Ht 1.778 m (5' 10\")   Wt 119.3 kg (263 lb)   SpO2 96%   BMI 37.74 kg/m²     Physical Exam   Constitutional: He is oriented to person, place, and time. He appears well-nourished. No distress.   Obese   HENT:   Head: Normocephalic and atraumatic.   Eyes: Pupils are equal, round, and reactive to light. No scleral icterus.   Neck: No JVD present. No tracheal deviation present.   Cardiovascular: Normal rate and regular rhythm.   No murmur heard.  Pulmonary/Chest: Breath sounds normal. No respiratory distress. He has no wheezes.   Abdominal: Soft. Bowel sounds are normal. He exhibits no distension. There is no tenderness.   Obese   Musculoskeletal: He exhibits no edema.   Neurological: He is alert and oriented to person, place, and time.   Skin: Skin is warm and dry.   Psychiatric: He has a normal mood and affect.       Assessment:     1. Chest pain on breathing  Lipid Profile    Treadmill Stress   2. " Abnormally low high density lipoprotein (HDL) cholesterol with hypertriglyceridemia  Lipid Profile    Treadmill Stress   3. Other specified personal risk factors, not elsewhere classified  Lipid Profile    Treadmill Stress    CT-CARDIAC SCORING       Medical Decision Making:  Today's Assessment / Status / Plan:   Chest pain: The pain is atypical and that is localized and pleuritic.  On exam he does have chest wall pain to palpation over the left fifth costochondral junction which mimics his symptoms.  I suspect his pain is noncardiac in etiology.  He does, however, have significant risk factors and a treadmill will be obtained.    Mixed hyperlipidemia: Most recent lab from 2018, revealed triglyceride of 397 and LDL of 47.  HDL was only 29.  He was on statin for this previously but this was discontinued at the recommendation of a doctor while he was living in [place].    Coronary calcification: Patient had a CT scan during his recent ER visit and this showed evidence of coronary calcification.  This was not quantified and therefore a CT coronary calcification study will be obtained.    Plan:  Treadmill testing to exclude ischemia as a cause of his current chest pain.  Repeat lipid testing.  Weight loss advised as a treatment for his hypertriglyceridemia  Coronary calcium scan to better quantify his degree of coronary calcification.  He will be notified of the results and return if needed.

## 2019-08-23 LAB
BACTERIA UR CULT: ABNORMAL
SIGNIFICANT IND 70042: ABNORMAL
SITE SITE: ABNORMAL
SOURCE SOURCE: ABNORMAL

## 2019-08-24 NOTE — ED NOTES
ED Positive Culture Follow-up/Notification Note:   Date: 08/23/2019    Patient seen in the ED on 8/20/2019 for chest pain.  1. Other chest pain    2. Acute UTI      Discharge Medication List as of 8/21/2019  2:18 AM      START taking these medications    Details   !! ciprofloxacin (CIPRO) 500 MG Tab Take 1 Tab by mouth 2 times a day for 5 days., Disp-10 Tab, R-0, Print Rx Paper       !! - Potential duplicate medications found. Please discuss with provider.        Allergies: Patient has no known allergies.    Vitals:    08/20/19 2138 08/20/19 2320 08/21/19 0010 08/21/19 0213   BP: 136/77 120/61 116/64 113/66   Pulse: (!) 106 82 66 64   Resp: 16 18 16 16   Temp: 37.2 °C (99 °F)   36.3 °C (97.4 °F)   TempSrc: Oral   Temporal   SpO2: 95% 96% 96%    Weight:       Height:           Final cultures:   Results     Procedure Component Value Units Date/Time    URINE CULTURE(NEW) [[id number]]  (Abnormal)  (Susceptibility) Collected:  08/20/19 2330    Order Status:  Completed Specimen:  Urine Updated:  08/23/19 1544     Significant Indicator POS     Source UR     Site -     Culture Result -      Enterococcus faecalis  >100,000 cfu/mL        Enterococcus faecium  >100,000 cfu/mL      Susceptibility     Enterococcus faecalis (1)     Antibiotic Interpretation Microscan Method Status    Ciprofloxacin Resistant >2 mcg/mL CATHERINE Final    Daptomycin Sensitive 1 mcg/mL CATHERINE Final    Ampicillin Sensitive <=2 mcg/mL CATHERINE Final    Nitrofurantoin Sensitive <=32 mcg/mL CATHERINE Final    Tetracycline Resistant >8 mcg/mL CATHERINE Final    Penicillin Sensitive 8 mcg/mL CATHERINE Final          Enterococcus faecium (2)     Antibiotic Interpretation Microscan Method Status    Ciprofloxacin Resistant >2 mcg/mL CATHERINE Final    Daptomycin Sensitive <=0.5 mcg/mL CATHERINE Final    Ampicillin Sensitive <=2 mcg/mL CATHERINE Final    Nitrofurantoin Sensitive <=32 mcg/mL CATHERINE Final    Tetracycline Resistant >8 mcg/mL CATHERINE Final    Penicillin Sensitive 2 mcg/mL CATHERINE Final                    Urinalysis, culture if indicated [[id number]]  (Abnormal) Collected:  08/20/19 2330    Order Status:  Completed Specimen:  Blood from Urine, Clean Catch Updated:  08/21/19 0117     Color Yellow     Character Hazy     Specific Gravity <=1.005     Ph 6.5     Glucose Negative mg/dL      Ketones Negative mg/dL      Protein Negative mg/dL      Bilirubin Negative     Nitrite Negative     Leukocyte Esterase Small     Occult Blood Negative     Micro Urine Req Microscopic    URINE CULTURE(NEW) [[id number]]     Order Status:  Canceled           Plan:   Called patient on 8/23 at 1600 and had to leave a voicemail instructing him to return my call  Awaiting patient to return phone call   Isolated organism is resistant to prescribed therapy ciprofloxacin  Sent letter to patient to notify of positive culture result and that his current antibiotics may NOT be effective.    IF patient returns my phone call I plan on prescribing   New Rx: Amoxicillin 500 mg capsule take one capsule by mouth every 8 hours for 7 days  #21, no refills - MD: Juliet per protocol    Gloria Eduardo, Pharm.D., BCPS    Patient case discussed with Nikki Ybarra, MarianneD BCPS

## 2019-08-26 ENCOUNTER — HOSPITAL ENCOUNTER (OUTPATIENT)
Dept: LAB | Facility: MEDICAL CENTER | Age: 59
End: 2019-08-26
Attending: INTERNAL MEDICINE
Payer: COMMERCIAL

## 2019-08-26 DIAGNOSIS — E78.1 ABNORMALLY LOW HIGH DENSITY LIPOPROTEIN (HDL) CHOLESTEROL WITH HYPERTRIGLYCERIDEMIA: ICD-10-CM

## 2019-08-26 DIAGNOSIS — R07.1 CHEST PAIN ON BREATHING: ICD-10-CM

## 2019-08-26 DIAGNOSIS — Z91.89 OTHER SPECIFIED PERSONAL RISK FACTORS, NOT ELSEWHERE CLASSIFIED: ICD-10-CM

## 2019-08-26 DIAGNOSIS — E78.6 ABNORMALLY LOW HIGH DENSITY LIPOPROTEIN (HDL) CHOLESTEROL WITH HYPERTRIGLYCERIDEMIA: ICD-10-CM

## 2019-08-26 LAB
CHOLEST SERPL-MCNC: 157 MG/DL (ref 100–199)
FASTING STATUS PATIENT QL REPORTED: NORMAL
HDLC SERPL-MCNC: 27 MG/DL
LDLC SERPL CALC-MCNC: 90 MG/DL
TRIGL SERPL-MCNC: 200 MG/DL (ref 0–149)

## 2019-08-26 PROCEDURE — 80061 LIPID PANEL: CPT

## 2019-08-26 PROCEDURE — 36415 COLL VENOUS BLD VENIPUNCTURE: CPT

## 2019-09-05 ENCOUNTER — HOSPITAL ENCOUNTER (OUTPATIENT)
Dept: RADIOLOGY | Facility: MEDICAL CENTER | Age: 59
End: 2019-09-05
Attending: INTERNAL MEDICINE
Payer: COMMERCIAL

## 2019-09-05 DIAGNOSIS — Z91.89 OTHER SPECIFIED PERSONAL RISK FACTORS, NOT ELSEWHERE CLASSIFIED: ICD-10-CM

## 2019-09-05 PROCEDURE — 4410556 CT-CARDIAC SCORING

## 2019-09-06 ENCOUNTER — TELEPHONE (OUTPATIENT)
Dept: CARDIOLOGY | Facility: MEDICAL CENTER | Age: 59
End: 2019-09-06

## 2019-09-06 RX ORDER — ROSUVASTATIN CALCIUM 20 MG/1
20 TABLET, COATED ORAL EVERY EVENING
Qty: 90 TAB | Refills: 3 | Status: SHIPPED | OUTPATIENT
Start: 2019-09-06 | End: 2020-03-24

## 2019-09-06 NOTE — TELEPHONE ENCOUNTER
----- Message from Marcos Oakes M.D. sent at 9/5/2019  5:23 PM PDT -----  Regarding: Coronary calcium  Patient had a coronary calcification score of over 1000.  Recommend he start statins at 20 mg a day of rosuvastatin.  Treadmill is already been scheduled.

## 2019-09-10 ENCOUNTER — NON-PROVIDER VISIT (OUTPATIENT)
Dept: CARDIOLOGY | Facility: MEDICAL CENTER | Age: 59
End: 2019-09-10
Attending: INTERNAL MEDICINE
Payer: COMMERCIAL

## 2019-09-10 VITALS
HEART RATE: 72 BPM | BODY MASS INDEX: 37.65 KG/M2 | SYSTOLIC BLOOD PRESSURE: 130 MMHG | OXYGEN SATURATION: 94 % | WEIGHT: 263 LBS | HEIGHT: 70 IN | DIASTOLIC BLOOD PRESSURE: 86 MMHG

## 2019-09-10 DIAGNOSIS — E78.1 ABNORMALLY LOW HIGH DENSITY LIPOPROTEIN (HDL) CHOLESTEROL WITH HYPERTRIGLYCERIDEMIA: ICD-10-CM

## 2019-09-10 DIAGNOSIS — Z91.89 OTHER SPECIFIED PERSONAL RISK FACTORS, NOT ELSEWHERE CLASSIFIED: ICD-10-CM

## 2019-09-10 DIAGNOSIS — R07.1 CHEST PAIN ON BREATHING: ICD-10-CM

## 2019-09-10 DIAGNOSIS — E78.6 ABNORMALLY LOW HIGH DENSITY LIPOPROTEIN (HDL) CHOLESTEROL WITH HYPERTRIGLYCERIDEMIA: ICD-10-CM

## 2019-09-10 LAB — TREADMILL STRESS: NORMAL

## 2019-09-10 PROCEDURE — 93015 CV STRESS TEST SUPVJ I&R: CPT | Performed by: INTERNAL MEDICINE

## 2019-09-12 ENCOUNTER — TELEPHONE (OUTPATIENT)
Dept: CARDIOLOGY | Facility: MEDICAL CENTER | Age: 59
End: 2019-09-12

## 2019-09-12 NOTE — TELEPHONE ENCOUNTER
----- Message from Marcos Oakes M.D. sent at 9/12/2019  7:34 AM PDT -----  Treadmill is negative for ischemia.  His exercise tolerance is a bit low for his age.  Recommend more physical activity such as a walking program.

## 2019-09-25 ENCOUNTER — OFFICE VISIT (OUTPATIENT)
Dept: URGENT CARE | Facility: CLINIC | Age: 59
End: 2019-09-25
Payer: COMMERCIAL

## 2019-09-25 VITALS
OXYGEN SATURATION: 94 % | BODY MASS INDEX: 36.51 KG/M2 | SYSTOLIC BLOOD PRESSURE: 126 MMHG | RESPIRATION RATE: 20 BRPM | TEMPERATURE: 97.3 F | WEIGHT: 255 LBS | HEART RATE: 70 BPM | DIASTOLIC BLOOD PRESSURE: 76 MMHG | HEIGHT: 70 IN

## 2019-09-25 DIAGNOSIS — S90.424A: ICD-10-CM

## 2019-09-25 DIAGNOSIS — S91.109A DEGLOVING INJURY OF TOE: ICD-10-CM

## 2019-09-25 PROCEDURE — 99214 OFFICE O/P EST MOD 30 MIN: CPT | Performed by: PHYSICIAN ASSISTANT

## 2019-09-25 RX ORDER — CEPHALEXIN 500 MG/1
500 CAPSULE ORAL 3 TIMES DAILY
Qty: 15 CAP | Refills: 0 | Status: SHIPPED | OUTPATIENT
Start: 2019-09-25 | End: 2019-09-30

## 2019-09-30 ASSESSMENT — ENCOUNTER SYMPTOMS
VOMITING: 0
FEVER: 0
NAUSEA: 0
MYALGIAS: 1
SORE THROAT: 0
CHILLS: 0
PALPITATIONS: 0
BLURRED VISION: 0
TINGLING: 0
SENSORY CHANGE: 0
SHORTNESS OF BREATH: 0

## 2019-09-30 NOTE — PROGRESS NOTES
Subjective:      Edd Beal is a 58 y.o. male who presents with Laceration (loss balance, right side of pinky toe, bleeding x today)    HPI:  This is a new problem. Edd Beal is a 58 y.o. male who presents today for evaluation of possible laceration of right small toe. He states that earlier today he was carrying his wife's scooter up the stairs when it slipped and hit his right foot. He says that there is a fairly sharp edge on the bottom of one of the foot rests that nicked his right foot. He says that it was bleeding and painful, so they came here to have it evaluated and possibly repaired. No numbness/tingling. Tetanus is UTD. Incident occurred 30 minutes prior to arrival.      Review of Systems   Constitutional: Negative for chills and fever.   HENT: Negative for sore throat.    Eyes: Negative for blurred vision.   Respiratory: Negative for shortness of breath.    Cardiovascular: Negative for chest pain and palpitations.   Gastrointestinal: Negative for nausea and vomiting.   Musculoskeletal: Positive for myalgias (Right small toe pain). Negative for joint pain.   Skin:        Laceration of right small toe   Neurological: Negative for tingling and sensory change.       PMH:  has a past medical history of BPH (benign prostatic hyperplasia), Facial basal cell cancer (2007), and HLD (hyperlipidemia).  MEDS:   Current Outpatient Medications:   •  cephALEXin (KEFLEX) 500 MG Cap, Take 1 Cap by mouth 3 times a day for 5 days., Disp: 15 Cap, Rfl: 0  •  rosuvastatin (CRESTOR) 20 MG Tab, Take 1 Tab by mouth every evening., Disp: 90 Tab, Rfl: 3  •  aspirin EC (ECOTRIN) 81 MG Tablet Delayed Response, Take 81 mg by mouth every day., Disp: , Rfl:   •  Ascorbic Acid (VITAMIN C) 1000 MG Tab, Take  by mouth., Disp: , Rfl:   •  Omega-3 Fatty Acids (FISH OIL) 1000 MG Cap capsule, Take 1,000 mg by mouth 3 times a day, with meals., Disp: , Rfl:   •  pantoprazole (PROTONIX) 40 MG Tablet Delayed Response, Take  "40 mg by mouth every evening., Disp: , Rfl:   •  tamsulosin (FLOMAX) 0.4 MG capsule, Take 0.4 mg by mouth every evening., Disp: , Rfl:   ALLERGIES: No Known Allergies  SURGHX:   Past Surgical History:   Procedure Laterality Date   • EYE SURGERY  09/23/2019    Left eye laser surgery   • PB CYSTOSCOPY,INSERT URETERAL STENT  6/26/2019    Procedure: CYSTOSCOPY- LASER LITHOTRIPSY  FOR URETHERAL STONE.;  Surgeon: Dion Olivares M.D.;  Location: SURGERY [facility];  Service: Urology   • ATHROPLASTY      right knee   • EYE SURGERY      cataracts bilateral, corneal transplant   • EYE SURGERY      cataract AND corneal replacement BILATERALLY   • HERNIA REPAIR       SOCHX:  reports that he has never smoked. He has never used smokeless tobacco. He reports that he drinks alcohol. He reports that he does not use drugs.  FH: Family history was reviewed, no pertinent findings to report     Objective:     /76 (BP Location: Left arm, Patient Position: Sitting, BP Cuff Size: Adult long)   Pulse 70   Temp 36.3 °C (97.3 °F) (Temporal)   Resp 20   Ht 1.778 m (5' 10\")   Wt 115.7 kg (255 lb)   SpO2 94%   BMI 36.59 kg/m²      Physical Exam   Constitutional: He is oriented to person, place, and time. He appears well-developed and well-nourished.   HENT:   Head: Normocephalic and atraumatic.   Right Ear: External ear normal.   Left Ear: External ear normal.   Eyes: Pupils are equal, round, and reactive to light. Conjunctivae are normal.   Cardiovascular: Normal rate, regular rhythm and normal heart sounds.   No murmur heard.  Pulmonary/Chest: Effort normal and breath sounds normal. He has no wheezes.   Musculoskeletal:   Right small toe. Full ROM. Midlly TTP. Epidermal latyer of skin on medial/plantar aspect is  from the dermal layer. No foreign bodies or laceration noted. Sensation intact. Cap refill < 2 seconds.   Neurological: He is alert and oriented to person, place, and time.   Skin: Skin is warm and " dry. Capillary refill takes less than 2 seconds.   Psychiatric: He has a normal mood and affect. His behavior is normal. Judgment normal.       Wound was cleaned. Polysporin was applied. Skin from the blister was then draped back over the toe. Then it was dressed with xeroform, nonstick, and coban.      Assessment/Plan:     1. Blister of fifth toe, right, initial encounter  - cephALEXin (KEFLEX) 500 MG Cap; Take 1 Cap by mouth 3 times a day for 5 days.  Dispense: 15 Cap; Refill: 0    2. Degloving injury of toe  - cephALEXin (KEFLEX) 500 MG Cap; Take 1 Cap by mouth 3 times a day for 5 days.  Dispense: 15 Cap; Refill: 0        Patient discharged without any immediate complications.  Wound care instructions provided.  OTC analgesics prn  Keep elevated/ice as needed  Worsening/infection precautions given.  Patient states understands agrees with treatment plan and follow up.        Differential Diagnosis, natural history, and supportive care discussed. Return to the Urgent Care or follow up with your PCP if symptoms fail to resolve, or for any new or worsening symptoms. Emergency room precautions discussed. Patient and/or family appears understanding of information.

## 2019-10-27 ENCOUNTER — APPOINTMENT (OUTPATIENT)
Dept: RADIOLOGY | Facility: MEDICAL CENTER | Age: 59
End: 2019-10-27
Attending: EMERGENCY MEDICINE
Payer: COMMERCIAL

## 2019-10-27 ENCOUNTER — HOSPITAL ENCOUNTER (EMERGENCY)
Facility: MEDICAL CENTER | Age: 59
End: 2019-10-27
Attending: EMERGENCY MEDICINE
Payer: COMMERCIAL

## 2019-10-27 VITALS
TEMPERATURE: 97.5 F | DIASTOLIC BLOOD PRESSURE: 57 MMHG | OXYGEN SATURATION: 93 % | BODY MASS INDEX: 36.93 KG/M2 | RESPIRATION RATE: 16 BRPM | HEIGHT: 70 IN | SYSTOLIC BLOOD PRESSURE: 115 MMHG | WEIGHT: 257.94 LBS | HEART RATE: 62 BPM

## 2019-10-27 DIAGNOSIS — L03.115 CELLULITIS OF RIGHT LOWER EXTREMITY: ICD-10-CM

## 2019-10-27 LAB
ANION GAP SERPL CALC-SCNC: 14 MMOL/L (ref 0–11.9)
BASOPHILS # BLD AUTO: 0.5 % (ref 0–1.8)
BASOPHILS # BLD: 0.04 K/UL (ref 0–0.12)
BUN SERPL-MCNC: 16 MG/DL (ref 8–22)
CALCIUM SERPL-MCNC: 9.1 MG/DL (ref 8.4–10.2)
CHLORIDE SERPL-SCNC: 102 MMOL/L (ref 96–112)
CO2 SERPL-SCNC: 22 MMOL/L (ref 20–33)
CREAT SERPL-MCNC: 1.05 MG/DL (ref 0.5–1.4)
CRP SERPL HS-MCNC: 0.13 MG/DL (ref 0–0.75)
EOSINOPHIL # BLD AUTO: 0.16 K/UL (ref 0–0.51)
EOSINOPHIL NFR BLD: 1.9 % (ref 0–6.9)
ERYTHROCYTE [DISTWIDTH] IN BLOOD BY AUTOMATED COUNT: 39 FL (ref 35.9–50)
ERYTHROCYTE [SEDIMENTATION RATE] IN BLOOD BY WESTERGREN METHOD: 3 MM/HOUR (ref 0–20)
GLUCOSE SERPL-MCNC: 96 MG/DL (ref 65–99)
HCT VFR BLD AUTO: 44.5 % (ref 42–52)
HGB BLD-MCNC: 15.8 G/DL (ref 14–18)
IMM GRANULOCYTES # BLD AUTO: 0.02 K/UL (ref 0–0.11)
IMM GRANULOCYTES NFR BLD AUTO: 0.2 % (ref 0–0.9)
LYMPHOCYTES # BLD AUTO: 3.43 K/UL (ref 1–4.8)
LYMPHOCYTES NFR BLD: 40.5 % (ref 22–41)
MCH RBC QN AUTO: 31.7 PG (ref 27–33)
MCHC RBC AUTO-ENTMCNC: 35.5 G/DL (ref 33.7–35.3)
MCV RBC AUTO: 89.2 FL (ref 81.4–97.8)
MONOCYTES # BLD AUTO: 0.65 K/UL (ref 0–0.85)
MONOCYTES NFR BLD AUTO: 7.7 % (ref 0–13.4)
NEUTROPHILS # BLD AUTO: 4.17 K/UL (ref 1.82–7.42)
NEUTROPHILS NFR BLD: 49.2 % (ref 44–72)
NRBC # BLD AUTO: 0 K/UL
NRBC BLD-RTO: 0 /100 WBC
PLATELET # BLD AUTO: 165 K/UL (ref 164–446)
PMV BLD AUTO: 9.9 FL (ref 9–12.9)
POTASSIUM SERPL-SCNC: 3.7 MMOL/L (ref 3.6–5.5)
RBC # BLD AUTO: 4.99 M/UL (ref 4.7–6.1)
SODIUM SERPL-SCNC: 138 MMOL/L (ref 135–145)
WBC # BLD AUTO: 8.5 K/UL (ref 4.8–10.8)

## 2019-10-27 PROCEDURE — 85025 COMPLETE CBC W/AUTO DIFF WBC: CPT

## 2019-10-27 PROCEDURE — 80048 BASIC METABOLIC PNL TOTAL CA: CPT

## 2019-10-27 PROCEDURE — A9270 NON-COVERED ITEM OR SERVICE: HCPCS | Performed by: EMERGENCY MEDICINE

## 2019-10-27 PROCEDURE — 93971 EXTREMITY STUDY: CPT | Mod: RT

## 2019-10-27 PROCEDURE — 99284 EMERGENCY DEPT VISIT MOD MDM: CPT

## 2019-10-27 PROCEDURE — 700102 HCHG RX REV CODE 250 W/ 637 OVERRIDE(OP): Performed by: EMERGENCY MEDICINE

## 2019-10-27 PROCEDURE — 86140 C-REACTIVE PROTEIN: CPT

## 2019-10-27 PROCEDURE — 73562 X-RAY EXAM OF KNEE 3: CPT | Mod: RT

## 2019-10-27 PROCEDURE — 85652 RBC SED RATE AUTOMATED: CPT

## 2019-10-27 RX ORDER — SULFAMETHOXAZOLE AND TRIMETHOPRIM 800; 160 MG/1; MG/1
1 TABLET ORAL 2 TIMES DAILY
Qty: 10 TAB | Refills: 0 | Status: SHIPPED | OUTPATIENT
Start: 2019-10-27 | End: 2019-11-01

## 2019-10-27 RX ORDER — CEPHALEXIN 500 MG/1
500 CAPSULE ORAL 4 TIMES DAILY
Qty: 20 CAP | Refills: 0 | Status: SHIPPED | OUTPATIENT
Start: 2019-10-27 | End: 2019-11-01

## 2019-10-27 RX ORDER — SULFAMETHOXAZOLE AND TRIMETHOPRIM 800; 160 MG/1; MG/1
1 TABLET ORAL ONCE
Status: COMPLETED | OUTPATIENT
Start: 2019-10-27 | End: 2019-10-27

## 2019-10-27 RX ORDER — CEPHALEXIN 250 MG/1
500 CAPSULE ORAL ONCE
Status: COMPLETED | OUTPATIENT
Start: 2019-10-27 | End: 2019-10-27

## 2019-10-27 RX ADMIN — CEPHALEXIN 500 MG: 250 CAPSULE ORAL at 20:12

## 2019-10-27 RX ADMIN — SULFAMETHOXAZOLE AND TRIMETHOPRIM 1 TABLET: 800; 160 TABLET ORAL at 20:12

## 2019-10-27 ASSESSMENT — ENCOUNTER SYMPTOMS
ABDOMINAL PAIN: 0
BLOOD IN STOOL: 0
BACK PAIN: 0
RESPIRATORY NEGATIVE: 1
MYALGIAS: 0
CONSTITUTIONAL NEGATIVE: 1
BRUISES/BLEEDS EASILY: 0
FEVER: 0
FOCAL WEAKNESS: 0
CARDIOVASCULAR NEGATIVE: 1
NECK PAIN: 0
GASTROINTESTINAL NEGATIVE: 1
HEADACHES: 0
FLANK PAIN: 0
SEIZURES: 0
SHORTNESS OF BREATH: 0
HALLUCINATIONS: 0
WEAKNESS: 0
EYES NEGATIVE: 1
EYE PAIN: 0
SORE THROAT: 0

## 2019-10-27 ASSESSMENT — PAIN DESCRIPTION - DESCRIPTORS: DESCRIPTORS: ACHING

## 2019-10-28 NOTE — ED NOTES
Pt with Hx of DVT and placed on warfarin in 2016 post knee replacement, came in c/o R knee pain at 6/10, R calf swelling and tenderness, warm to touch since yesterday, pt states he traveled to [place] 3 weeks ago and traveled form [place] to [place] by car, pt came back to [place]  last Monday and came back to [place] last Thursday, pt started feeling symptoms yesterday. Denies SOB, CP, dizziness.

## 2019-10-28 NOTE — ED PROVIDER NOTES
ED Provider Note    CHIEF COMPLAINT  Chief Complaint   Patient presents with   • Leg Pain       HPI  HPI    58 y.o. male presents to the emergency department with chief complaint of right-sided knee and leg pain and swelling.  Patient reports he has had similar swelling with DVT in the past.  He denies any shortness of breath or chest pain.  He states that he had a red rash present overlying his skin of his right knee earlier today and increased warmth to that area that has since subsided.  He denies any recent trauma to affected leg or significant pain with knee range of motion.  He denies any pain with hip range of motion.  He denies any other rashes.      REVIEW OF SYSTEMS  Review of Systems   Constitutional: Negative.  Negative for fever.   HENT: Negative.  Negative for ear pain and sore throat.    Eyes: Negative.  Negative for pain.   Respiratory: Negative.  Negative for shortness of breath.    Cardiovascular: Negative.  Negative for chest pain.   Gastrointestinal: Negative.  Negative for abdominal pain and blood in stool.   Genitourinary: Negative for dysuria and flank pain.   Musculoskeletal: Negative for back pain, myalgias and neck pain.        Leg pain   Skin: Negative.  Negative for rash.   Neurological: Negative for focal weakness, seizures, weakness and headaches.   Endo/Heme/Allergies: Does not bruise/bleed easily.   Psychiatric/Behavioral: Negative for hallucinations and suicidal ideas.   All other systems reviewed and are negative.      PAST MEDICAL HISTORY   has a past medical history of BPH (benign prostatic hyperplasia), Facial basal cell cancer (2007), and HLD (hyperlipidemia).    SOCIAL HISTORY  Social History     Tobacco Use   • Smoking status: Never Smoker   • Smokeless tobacco: Never Used   Substance and Sexual Activity   • Alcohol use: Yes     Comment: occa   • Drug use: No   • Sexual activity: Not on file       SURGICAL HISTORY   has a past surgical history that includes eye surgery;  "athroplasty; hernia repair; eye surgery; cystoscopy,insert ureteral stent (6/26/2019); and eye surgery (09/23/2019).    CURRENT MEDICATIONS  Home Medications     Reviewed by Monico Melgar R.N. (Registered Nurse) on 10/27/19 at 1827  Med List Status: Partial   Medication Last Dose Status   Ascorbic Acid (VITAMIN C) 1000 MG Tab 10/27/2019 Active   aspirin EC (ECOTRIN) 81 MG Tablet Delayed Response 10/27/2019 Active   Omega-3 Fatty Acids (FISH OIL) 1000 MG Cap capsule 10/26/2019 Active   pantoprazole (PROTONIX) 40 MG Tablet Delayed Response 10/26/2019 Active   rosuvastatin (CRESTOR) 20 MG Tab Unknown Active   tamsulosin (FLOMAX) 0.4 MG capsule Unknown Active                ALLERGIES  No Known Allergies    PHYSICAL EXAM  VITAL SIGNS: /57   Pulse 62   Temp 36.4 °C (97.5 °F) (Temporal)   Resp 16   Ht 1.778 m (5' 10\")   Wt 117 kg (257 lb 15 oz)   SpO2 93%   BMI 37.01 kg/m²  @ADRYAN[[id number]::@  Pulse ox interpretation: I interpret this pulse ox as normal.    Physical Exam   Constitutional: He is oriented to person, place, and time and well-developed, well-nourished, and in no distress.   HENT:   Head: Normocephalic.   Right Ear: External ear normal.   Left Ear: External ear normal.   Mouth/Throat: No oropharyngeal exudate.   Eyes: Pupils are equal, round, and reactive to light. EOM are normal. No scleral icterus.   Neck: Normal range of motion.   Cardiovascular: Normal rate.   Pulmonary/Chest: Effort normal. No respiratory distress.   Abdominal: He exhibits no distension. There is no tenderness.   Musculoskeletal: Normal range of motion. He exhibits no deformity.   No increased warmth or pain with knee range of motion at this time.  5 out of 5 strength x4.  Intact distal dorsiflexion and plantarflexion.  2+ peripheral pulses distally.  No pitting lower extremity edema.    Neurological: He is alert and oriented to person, place, and time. Coordination normal.   Skin: Skin is warm and dry. No rash noted. No " erythema.   Psychiatric: Affect and judgment normal.   Nursing note and vitals reviewed.      DIAGNOSTIC STUDIES / PROCEDURES    LABS/EKG  Results for orders placed or performed during the hospital encounter of 10/27/19   CBC WITH DIFFERENTIAL   Result Value Ref Range    WBC 8.5 4.8 - 10.8 K/uL    RBC 4.99 4.70 - 6.10 M/uL    Hemoglobin 15.8 14.0 - 18.0 g/dL    Hematocrit 44.5 42.0 - 52.0 %    MCV 89.2 81.4 - 97.8 fL    MCH 31.7 27.0 - 33.0 pg    MCHC 35.5 (H) 33.7 - 35.3 g/dL    RDW 39.0 35.9 - 50.0 fL    Platelet Count 165 164 - 446 K/uL    MPV 9.9 9.0 - 12.9 fL    Neutrophils-Polys 49.20 44.00 - 72.00 %    Lymphocytes 40.50 22.00 - 41.00 %    Monocytes 7.70 0.00 - 13.40 %    Eosinophils 1.90 0.00 - 6.90 %    Basophils 0.50 0.00 - 1.80 %    Immature Granulocytes 0.20 0.00 - 0.90 %    Nucleated RBC 0.00 /100 WBC    Neutrophils (Absolute) 4.17 1.82 - 7.42 K/uL    Lymphs (Absolute) 3.43 1.00 - 4.80 K/uL    Monos (Absolute) 0.65 0.00 - 0.85 K/uL    Eos (Absolute) 0.16 0.00 - 0.51 K/uL    Baso (Absolute) 0.04 0.00 - 0.12 K/uL    Immature Granulocytes (abs) 0.02 0.00 - 0.11 K/uL    NRBC (Absolute) 0.00 K/uL   Basic Metabolic Panel   Result Value Ref Range    Sodium 138 135 - 145 mmol/L    Potassium 3.7 3.6 - 5.5 mmol/L    Chloride 102 96 - 112 mmol/L    Co2 22 20 - 33 mmol/L    Glucose 96 65 - 99 mg/dL    Bun 16 8 - 22 mg/dL    Creatinine 1.05 0.50 - 1.40 mg/dL    Calcium 9.1 8.4 - 10.2 mg/dL    Anion Gap 14.0 (H) 0.0 - 11.9   CRP QUANTITIVE (NON-CARDIAC)   Result Value Ref Range    Stat C-Reactive Protein 0.13 0.00 - 0.75 mg/dL   WESTERGREN SED RATE   Result Value Ref Range    Sed Rate Westergren 3 0 - 20 mm/hour   ESTIMATED GFR   Result Value Ref Range    GFR If African American >60 >60 mL/min/1.73 m 2    GFR If Non African American >60 >60 mL/min/1.73 m 2       RADIOLOGY  DX-KNEE 3 VIEWS RIGHT   Final Result      1.  No acute findings.      2.  Status post right total knee arthroplasty.      US-EXTREMITY VENOUS LOWER  UNILAT RIGHT   Final Result      1.  No evidence of right  lower extremity deep venous thrombosis.      2.  Small right knee effusion           COURSE & MEDICAL DECISION MAKING  Pertinent Labs & Imaging studies reviewed by me. (See chart for details)    58 y.o. male PMH DVT p/w leg redness and swellling.     Differential diagnosis includes but is not limited to:    No appreciable warmth or pain w/ ROM of joint.  Patient offered arthrocentesis today to rule out septic joints to make sure his joint not infected, however he prefers to be started on oral antibx and follow-up with orthopedist as outpatient within the next 1 to 2 days.  No ultrasound evidence of DVT at this time.    Ultrasound and x-ray consistent with small right-sided knee effusion.  Given report of rash and skin redness earlier today, plan to start patient on antibiotics to cover for cellulitis.  Patient agrees to start taking antibiotics and follow-up with both orthopedist in 1-2 days and follow-up with primary care physician for repeat ultrasound within the next 2 weeks.      FINAL IMPRESSION  Visit Diagnoses     ICD-10-CM   1. Cellulitis of right lower extremity L03.115              Electronically signed by: Chapito Agee, 10/27/2019 6:51 PM

## 2019-10-28 NOTE — ED NOTES
ERP at bedside. Pt agrees with plan of care discussed by ERP. AIDET acknowledged with patient, Call light within reach. Will continue to monitor.

## 2019-10-28 NOTE — ED NOTES
Discharge instructions given and discussed. RX for keflex given and pt educated. Pt educated to come back to ER for new or worsening symptoms and follow up with PCP as instructed. Pt verbalized understanding. VSS. Pt  Discharged in stable condition.

## 2019-10-28 NOTE — ED TRIAGE NOTES
"Presents with a hx of right leg DVT approximately 2 years ago.  He reports experiencing acute onset of pain, swelling, redness, and warmth affecting his right leg since yesterday.  Chief Complaint   Patient presents with   • Leg Pain     /77   Pulse 73   Temp 37.1 °C (98.7 °F) (Temporal)   Resp 18   Ht 1.778 m (5' 10\")   Wt 117 kg (257 lb 15 oz)   SpO2 98%   BMI 37.01 kg/m²     "

## 2019-10-28 NOTE — DISCHARGE INSTRUCTIONS
Please call your regular doctor to obtain repeat ultrasound of your lower extremity within the next 1 to 2 weeks.

## 2019-11-07 ENCOUNTER — OFFICE VISIT (OUTPATIENT)
Dept: URGENT CARE | Facility: CLINIC | Age: 59
End: 2019-11-07
Payer: COMMERCIAL

## 2019-11-07 VITALS
RESPIRATION RATE: 20 BRPM | HEIGHT: 70 IN | TEMPERATURE: 98.6 F | BODY MASS INDEX: 36.94 KG/M2 | WEIGHT: 258 LBS | SYSTOLIC BLOOD PRESSURE: 110 MMHG | DIASTOLIC BLOOD PRESSURE: 70 MMHG | HEART RATE: 88 BPM | OXYGEN SATURATION: 98 %

## 2019-11-07 DIAGNOSIS — J06.9 VIRAL URI WITH COUGH: ICD-10-CM

## 2019-11-07 LAB
INT CON NEG: NORMAL
INT CON POS: NORMAL
S PYO AG THROAT QL: NEGATIVE

## 2019-11-07 PROCEDURE — 87880 STREP A ASSAY W/OPTIC: CPT | Performed by: FAMILY MEDICINE

## 2019-11-07 PROCEDURE — 99214 OFFICE O/P EST MOD 30 MIN: CPT | Performed by: FAMILY MEDICINE

## 2019-11-07 RX ORDER — FLUTICASONE PROPIONATE 50 MCG
1 SPRAY, SUSPENSION (ML) NASAL 2 TIMES DAILY
Qty: 1 BOTTLE | Refills: 0 | Status: SHIPPED | OUTPATIENT
Start: 2019-11-07 | End: 2020-03-24

## 2019-11-07 RX ORDER — BENZONATATE 200 MG/1
200 CAPSULE ORAL 3 TIMES DAILY PRN
Qty: 45 CAP | Refills: 0 | Status: SHIPPED | OUTPATIENT
Start: 2019-11-07 | End: 2020-03-24

## 2019-11-07 ASSESSMENT — PATIENT HEALTH QUESTIONNAIRE - PHQ9: CLINICAL INTERPRETATION OF PHQ2 SCORE: 0

## 2019-11-08 NOTE — PROGRESS NOTES
Chief Complaint   Patient presents with   • Cough     runny nose, throat discomfort, yellow phlegm x 3 days         congestion  This is a new problem. The current episode started 3 d ago. The problem has been unchanged.  He c/o productive cough and runny nose.   + mild sore throat      Pertinent negatives include no  Fevers, cough,   , nausea, vomiting, diarrhea, sweats, weight loss or wheezing. Nothing aggravates the symptoms.  Patient has tried nothing for the symptoms. There is no history of asthma.        Social History     Tobacco Use   • Smoking status: Never Smoker   • Smokeless tobacco: Never Used   Substance Use Topics   • Alcohol use: Yes     Comment: occa   • Drug use: No           Past Medical History:   Diagnosis Date   • Facial basal cell cancer 2007    lip, no recurrence   • BPH (benign prostatic hyperplasia)    • HLD (hyperlipidemia)          Current Outpatient Medications on File Prior to Visit   Medication Sig Dispense Refill   • rosuvastatin (CRESTOR) 20 MG Tab Take 1 Tab by mouth every evening. 90 Tab 3   • aspirin EC (ECOTRIN) 81 MG Tablet Delayed Response Take 81 mg by mouth every day.     • Ascorbic Acid (VITAMIN C) 1000 MG Tab Take  by mouth.     • Omega-3 Fatty Acids (FISH OIL) 1000 MG Cap capsule Take 1,000 mg by mouth 3 times a day, with meals.     • pantoprazole (PROTONIX) 40 MG Tablet Delayed Response Take 40 mg by mouth every evening.     • tamsulosin (FLOMAX) 0.4 MG capsule Take 0.4 mg by mouth every evening.       No current facility-administered medications on file prior to visit.          Review of Systems   Constitutional: Negative for fever and weight loss.   HENT: negative for otalgia  Cardiovascular - denies chest pain or dyspnea  Respiratory: Positive for cough.  .  Negative for wheezing.    Neurological: Negative for headaches.   GI - denies nausea, vomiting or diarrhea  Neuro - denies numbness or tingling.            Objective:     /70 (BP Location: Left arm, Patient  "Position: Sitting, BP Cuff Size: Adult)   Pulse 88   Temp 37 °C (98.6 °F) (Temporal)   Resp 20   Ht 1.778 m (5' 10\")   Wt 117 kg (258 lb)   SpO2 98%       Physical Exam   Constitutional: patient is oriented to person, place, and time. Patient appears well-developed and well-nourished. No distress.   HENT:   Head: Normocephalic and atraumatic.   Right Ear: External ear normal.   Left Ear: External ear normal.   TMs both normal.   Nose: Mucosal edema and clear postnasal drip present. Right sinus exhibits no maxillary sinus tenderness. Left sinus exhibits no maxillary sinus tenderness.   Mouth/Throat: Mucous membranes are normal. No oral lesions.  No posterior pharyngeal erythema.  No oropharyngeal exudate or posterior oropharyngeal edema.   Eyes: Conjunctivae and EOM are normal. Pupils are equal, round, and reactive to light. Right eye exhibits no discharge. Left eye exhibits no discharge. No scleral icterus.   Neck: Normal range of motion. Neck supple. No tracheal deviation present.   Cardiovascular: Normal rate, regular rhythm and normal heart sounds.  Exam reveals no friction rub.    Pulmonary/Chest: Effort normal. No respiratory distress. Patient has no wheezes or rhonchi. Patient has no rales.    Musculoskeletal:  exhibits no edema.   Lymphadenopathy:     Patient has no cervical adenopathy.      Neurological: patient is alert and oriented to person, place, and time.   Skin: Skin is warm and dry. No rash noted. No erythema.   Psychiatric: patient  has a normal mood and affect.  behavior is normal.   Nursing note and vitals reviewed.              Assessment/Plan:       1. Postnasal drip  2. Viral URI with cough    rapid strep neg .    - benzonatate (TESSALON) 200 MG capsule; Take 1 Cap by mouth 3 times a day as needed for Cough.  Dispense: 45 Cap; Refill: 0  - fluticasone (FLONASE) 50 MCG/ACT nasal spray; Spray 1 Spray in nose 2 times a day.  Dispense: 1 Bottle; Refill: 0    Follow up in one week if no " improvement, sooner if symptoms worsen.

## 2020-01-21 ENCOUNTER — NON-PROVIDER VISIT (OUTPATIENT)
Dept: URGENT CARE | Facility: CLINIC | Age: 60
End: 2020-01-21

## 2020-01-21 DIAGNOSIS — Z02.1 PRE-EMPLOYMENT DRUG SCREENING: ICD-10-CM

## 2020-01-21 LAB
AMP AMPHETAMINE: NORMAL
BAR BARBITURATES: NORMAL
BZO BENZODIAZEPINES: NORMAL
COC COCAINE: NORMAL
INT CON NEG: NEGATIVE
INT CON POS: POSITIVE
MDMA ECSTASY: NORMAL
MET METHAMPHETAMINES: NORMAL
MTD METHADONE: NORMAL
OPI OPIATES: NORMAL
OXY OXYCODONE: NORMAL
PCP PHENCYCLIDINE: NORMAL
POC URINE DRUG SCREEN OCDRS: NEGATIVE
THC: NORMAL

## 2020-01-21 PROCEDURE — 80305 DRUG TEST PRSMV DIR OPT OBS: CPT | Performed by: NURSE PRACTITIONER

## 2020-03-24 ENCOUNTER — APPOINTMENT (OUTPATIENT)
Dept: RADIOLOGY | Facility: MEDICAL CENTER | Age: 60
End: 2020-03-24
Attending: EMERGENCY MEDICINE
Payer: COMMERCIAL

## 2020-03-24 ENCOUNTER — HOSPITAL ENCOUNTER (EMERGENCY)
Facility: MEDICAL CENTER | Age: 60
End: 2020-03-24
Attending: EMERGENCY MEDICINE
Payer: COMMERCIAL

## 2020-03-24 VITALS
WEIGHT: 259.26 LBS | DIASTOLIC BLOOD PRESSURE: 68 MMHG | RESPIRATION RATE: 15 BRPM | BODY MASS INDEX: 37.12 KG/M2 | TEMPERATURE: 97.7 F | OXYGEN SATURATION: 93 % | SYSTOLIC BLOOD PRESSURE: 101 MMHG | HEART RATE: 61 BPM | HEIGHT: 70 IN

## 2020-03-24 DIAGNOSIS — M25.552 LEFT HIP PAIN: ICD-10-CM

## 2020-03-24 LAB
ANION GAP SERPL CALC-SCNC: 12 MMOL/L (ref 7–16)
BASOPHILS # BLD AUTO: 0.4 % (ref 0–1.8)
BASOPHILS # BLD: 0.04 K/UL (ref 0–0.12)
BUN SERPL-MCNC: 15 MG/DL (ref 8–22)
CALCIUM SERPL-MCNC: 9 MG/DL (ref 8.4–10.2)
CHLORIDE SERPL-SCNC: 104 MMOL/L (ref 96–112)
CO2 SERPL-SCNC: 18 MMOL/L (ref 20–33)
CREAT SERPL-MCNC: 0.77 MG/DL (ref 0.5–1.4)
CRP SERPL HS-MCNC: 0.46 MG/DL (ref 0–0.75)
EOSINOPHIL # BLD AUTO: 0.11 K/UL (ref 0–0.51)
EOSINOPHIL NFR BLD: 1.1 % (ref 0–6.9)
ERYTHROCYTE [DISTWIDTH] IN BLOOD BY AUTOMATED COUNT: 41.2 FL (ref 35.9–50)
ERYTHROCYTE [SEDIMENTATION RATE] IN BLOOD BY WESTERGREN METHOD: <1 MM/HOUR (ref 0–20)
GLUCOSE SERPL-MCNC: 144 MG/DL (ref 65–99)
HCT VFR BLD AUTO: 45.7 % (ref 42–52)
HGB BLD-MCNC: 16.2 G/DL (ref 14–18)
IMM GRANULOCYTES # BLD AUTO: 0.03 K/UL (ref 0–0.11)
IMM GRANULOCYTES NFR BLD AUTO: 0.3 % (ref 0–0.9)
LYMPHOCYTES # BLD AUTO: 2.15 K/UL (ref 1–4.8)
LYMPHOCYTES NFR BLD: 21.7 % (ref 22–41)
MCH RBC QN AUTO: 32.3 PG (ref 27–33)
MCHC RBC AUTO-ENTMCNC: 35.4 G/DL (ref 33.7–35.3)
MCV RBC AUTO: 91.2 FL (ref 81.4–97.8)
MONOCYTES # BLD AUTO: 0.85 K/UL (ref 0–0.85)
MONOCYTES NFR BLD AUTO: 8.6 % (ref 0–13.4)
NEUTROPHILS # BLD AUTO: 6.72 K/UL (ref 1.82–7.42)
NEUTROPHILS NFR BLD: 67.9 % (ref 44–72)
NRBC # BLD AUTO: 0 K/UL
NRBC BLD-RTO: 0 /100 WBC
PLATELET # BLD AUTO: 167 K/UL (ref 164–446)
PMV BLD AUTO: 10.3 FL (ref 9–12.9)
POTASSIUM SERPL-SCNC: 4.6 MMOL/L (ref 3.6–5.5)
RBC # BLD AUTO: 5.01 M/UL (ref 4.7–6.1)
SODIUM SERPL-SCNC: 134 MMOL/L (ref 135–145)
WBC # BLD AUTO: 9.9 K/UL (ref 4.8–10.8)

## 2020-03-24 PROCEDURE — 700111 HCHG RX REV CODE 636 W/ 250 OVERRIDE (IP): Performed by: EMERGENCY MEDICINE

## 2020-03-24 PROCEDURE — 85025 COMPLETE CBC W/AUTO DIFF WBC: CPT

## 2020-03-24 PROCEDURE — 96372 THER/PROPH/DIAG INJ SC/IM: CPT

## 2020-03-24 PROCEDURE — 80048 BASIC METABOLIC PNL TOTAL CA: CPT

## 2020-03-24 PROCEDURE — 73502 X-RAY EXAM HIP UNI 2-3 VIEWS: CPT | Mod: LT

## 2020-03-24 PROCEDURE — 85652 RBC SED RATE AUTOMATED: CPT

## 2020-03-24 PROCEDURE — 99284 EMERGENCY DEPT VISIT MOD MDM: CPT

## 2020-03-24 PROCEDURE — 86140 C-REACTIVE PROTEIN: CPT

## 2020-03-24 RX ORDER — NAPROXEN 500 MG/1
500 TABLET ORAL 2 TIMES DAILY WITH MEALS
Qty: 20 TAB | Refills: 0 | Status: SHIPPED | OUTPATIENT
Start: 2020-03-24 | End: 2020-12-10

## 2020-03-24 RX ORDER — CYCLOBENZAPRINE HCL 10 MG
10 TABLET ORAL 3 TIMES DAILY PRN
Qty: 30 TAB | Refills: 0 | Status: SHIPPED | OUTPATIENT
Start: 2020-03-24 | End: 2020-09-30

## 2020-03-24 RX ORDER — ROSUVASTATIN CALCIUM 20 MG/1
20 TABLET, COATED ORAL
Status: SHIPPED | COMMUNITY
End: 2020-11-02

## 2020-03-24 RX ORDER — ONDANSETRON 4 MG/1
4 TABLET, ORALLY DISINTEGRATING ORAL ONCE
Status: COMPLETED | OUTPATIENT
Start: 2020-03-24 | End: 2020-03-24

## 2020-03-24 RX ORDER — HYDROMORPHONE HYDROCHLORIDE 1 MG/ML
1 INJECTION, SOLUTION INTRAMUSCULAR; INTRAVENOUS; SUBCUTANEOUS ONCE
Status: COMPLETED | OUTPATIENT
Start: 2020-03-24 | End: 2020-03-24

## 2020-03-24 RX ORDER — KETOROLAC TROMETHAMINE 30 MG/ML
60 INJECTION, SOLUTION INTRAMUSCULAR; INTRAVENOUS ONCE
Status: COMPLETED | OUTPATIENT
Start: 2020-03-24 | End: 2020-03-24

## 2020-03-24 RX ADMIN — ONDANSETRON 4 MG: 4 TABLET, ORALLY DISINTEGRATING ORAL at 06:54

## 2020-03-24 RX ADMIN — HYDROMORPHONE HYDROCHLORIDE 1 MG: 1 INJECTION, SOLUTION INTRAMUSCULAR; INTRAVENOUS; SUBCUTANEOUS at 07:02

## 2020-03-24 RX ADMIN — KETOROLAC TROMETHAMINE 60 MG: 30 INJECTION, SOLUTION INTRAMUSCULAR at 07:01

## 2020-03-24 ASSESSMENT — FIBROSIS 4 INDEX: FIB4 SCORE: 1.5

## 2020-03-24 NOTE — ED NOTES
Pt reminded not to drive after receiving narcotic pain medication here in the ER. Pt given written and oral dc instructions. Pt verbalized understanding of all instructions given. All questions answered. VSS. IV removed. Pt given paper prescriptions and educated on use. Pt given fu instructions and educated on s/s of when to return to the ER. Pt amb independently upon time of dc in stable condition.

## 2020-03-24 NOTE — ED TRIAGE NOTES
"Bib wife for above complaints.     Chief Complaint   Patient presents with   • Hip Pain     left hip pain started sunday afternoon. has not gotten any better. 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 denies injury or trauma to hip or leg      /73   Pulse 79   Temp 36.3 °C (97.3 °F) (Temporal)   Resp 18   Ht 1.778 m (5' 10\")   Wt 117.6 kg (259 lb 4.2 oz)   SpO2 96%   BMI 37.20 kg/m²     "

## 2020-03-24 NOTE — ED NOTES
"Pt to ed with c/o \"feeling like I dislocated my left hip. It hurt to move it starting Sunday, but I did not injure it anyway that I remember. Tonight it hurt to sleep\". Pt is alert and oriented x4 with family at bedside. Pt ambulatory to Room 8a from triage.   "

## 2020-03-24 NOTE — ED PROVIDER NOTES
CHIEF COMPLAINT  Chief Complaint   Patient presents with   • Hip Pain     left hip pain started sunday afternoon. has not gotten any better. 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 denies injury or trauma to hip or leg        HPI  Edd Beal is a 59 y.o. male who presents with left hip pain that started on Sunday in the afternoon.  He states he had no traumatic event.  The pain is in his left hip and does not radiate anywhere.  It is sharp and worsened by movement.  He denies any fever or recent illness.  He denies any personal history of diabetes.  He notes no redness in the area.  No numbness or tingling or weakness in the leg.  He does have a history of a DVT after a total knee replacement on the right leg.  Nothing seems to make it better.  He denies any prior issues with his hip on that side.    REVIEW OF SYSTEMS  No fever, no recent illness, no numbness tingling or weakness    PAST MEDICAL HISTORY  Past Medical History:   Diagnosis Date   • BPH (benign prostatic hyperplasia)    • Facial basal cell cancer 2007    lip, no recurrence   • HLD (hyperlipidemia)        FAMILY HISTORY  Family History   Problem Relation Age of Onset   • Cancer Mother 60        colon cancer   • Heart Disease Mother         CHF       SOCIAL HISTORY  Social History     Socioeconomic History   • Marital status:      Spouse name: Not on file   • Number of children: Not on file   • Years of education: Not on file   • Highest education level: Not on file   Occupational History   • Not on file   Social Needs   • Financial resource strain: Not on file   • Food insecurity     Worry: Not on file     Inability: Not on file   • Transportation needs     Medical: Not on file     Non-medical: Not on file   Tobacco Use   • Smoking status: Never Smoker   • Smokeless tobacco: Never Used   Substance and Sexual Activity   • Alcohol use: Yes     Comment: occa   • Drug use: No   • Sexual activity: Not on file   Lifestyle  "  • Physical activity     Days per week: Not on file     Minutes per session: Not on file   • Stress: Not on file   Relationships   • Social connections     Talks on phone: Not on file     Gets together: Not on file     Attends Congregational service: Not on file     Active member of club or organization: Not on file     Attends meetings of clubs or organizations: Not on file     Relationship status: Not on file   • Intimate partner violence     Fear of current or ex partner: Not on file     Emotionally abused: Not on file     Physically abused: Not on file     Forced sexual activity: Not on file   Other Topics Concern   • Not on file   Social History Narrative   • Not on file       SURGICAL HISTORY  Past Surgical History:   Procedure Laterality Date   • EYE SURGERY  09/23/2019    Left eye laser surgery   • PB CYSTOSCOPY,INSERT URETERAL STENT  6/26/2019    Procedure: CYSTOSCOPY- LASER LITHOTRIPSY  FOR URETHERAL STONE.;  Surgeon: Dion Olivares M.D.;  Location: SURGERY [facility];  Service: Urology   • ATHROPLASTY      right knee   • EYE SURGERY      cataracts bilateral, corneal transplant   • EYE SURGERY      cataract AND corneal replacement BILATERALLY   • HERNIA REPAIR         CURRENT MEDICATIONS  Home Medications    **Home medications have not yet been reviewed for this encounter**         ALLERGIES  No Known Allergies    PHYSICAL EXAM  VITAL SIGNS: /73   Pulse 79   Temp 36.3 °C (97.3 °F) (Temporal)   Resp 18   Ht 1.778 m (5' 10\")   Wt 117.6 kg (259 lb 4.2 oz)   SpO2 96%   BMI 37.20 kg/m²      Constitutional: Well developed, Well nourished, No acute distress, Non-toxic appearance.   HENT: Normocephalic, Atraumatic  Cardiovascular: Regular pulse  Lungs: No respiratory distress  Skin: Warm, Dry, no rash  Extremities: There is mild tenderness over the greater trochanter of the femur on the left, logroll demonstrates some mild discomfort but there is full range of motion of the joint with flexion " and extension, there is no redness over the area of the hip, distal pulses are 2+, motor function is 5 out of 5 and sensation is intact, there is no edema in the leg  Neurologic: Alert, appropriate, follows commands  Psychiatric: Affect normal    RADIOLOGY/PROCEDURES  DX-HIP-COMPLETE - UNILATERAL 2+ LEFT   Final Result      Degenerative changes without acute osseous abnormality.        Results for orders placed or performed during the hospital encounter of 03/24/20   BASIC METABOLIC PANEL   Result Value Ref Range    Sodium 134 (L) 135 - 145 mmol/L    Potassium 4.6 3.6 - 5.5 mmol/L    Chloride 104 96 - 112 mmol/L    Co2 18 (L) 20 - 33 mmol/L    Glucose 144 (H) 65 - 99 mg/dL    Bun 15 8 - 22 mg/dL    Creatinine 0.77 0.50 - 1.40 mg/dL    Calcium 9.0 8.4 - 10.2 mg/dL    Anion Gap 12.0 7.0 - 16.0   CRP QUANTITIVE (NON-CARDIAC)   Result Value Ref Range    Stat C-Reactive Protein 0.46 0.00 - 0.75 mg/dL   CBC WITH DIFFERENTIAL   Result Value Ref Range    WBC 9.9 4.8 - 10.8 K/uL    RBC 5.01 4.70 - 6.10 M/uL    Hemoglobin 16.2 14.0 - 18.0 g/dL    Hematocrit 45.7 42.0 - 52.0 %    MCV 91.2 81.4 - 97.8 fL    MCH 32.3 27.0 - 33.0 pg    MCHC 35.4 (H) 33.7 - 35.3 g/dL    RDW 41.2 35.9 - 50.0 fL    Platelet Count 167 164 - 446 K/uL    MPV 10.3 9.0 - 12.9 fL    Neutrophils-Polys 67.90 44.00 - 72.00 %    Lymphocytes 21.70 (L) 22.00 - 41.00 %    Monocytes 8.60 0.00 - 13.40 %    Eosinophils 1.10 0.00 - 6.90 %    Basophils 0.40 0.00 - 1.80 %    Immature Granulocytes 0.30 0.00 - 0.90 %    Nucleated RBC 0.00 /100 WBC    Neutrophils (Absolute) 6.72 1.82 - 7.42 K/uL    Lymphs (Absolute) 2.15 1.00 - 4.80 K/uL    Monos (Absolute) 0.85 0.00 - 0.85 K/uL    Eos (Absolute) 0.11 0.00 - 0.51 K/uL    Baso (Absolute) 0.04 0.00 - 0.12 K/uL    Immature Granulocytes (abs) 0.03 0.00 - 0.11 K/uL    NRBC (Absolute) 0.00 K/uL   Sed Rate   Result Value Ref Range    Sed Rate Westergren <1 0 - 20 mm/hour   ESTIMATED GFR   Result Value Ref Range    GFR If  African American >60 >60 mL/min/1.73 m 2    GFR If Non African American >60 >60 mL/min/1.73 m 2         COURSE & MEDICAL DECISION MAKING  Pertinent Labs & Imaging studies reviewed. (See chart for details)  This is a 59-year-old male with atraumatic hip pain.  He denies any trauma.  He has no radicular type symptoms and is neurovascular intact in his lower extremity.  I do not suspect DVT lumbar radiculopathy or arterial occlusion.  There is no evidence of fracture on x-ray and the patient has been able to bear weight here.  I do not suspect an occult femoral neck fracture at this point.  I did also send blood work and the patient sed rate and CRP are normal and his white count is normal.  I do not suspect septic arthritis.  The patient does have degenerative changes on his hip on x-ray.  He will be referred to orthopedics and given an anti-inflammatory and muscle relaxant in the interim.  He will return for new or concerning symptoms.    FINAL IMPRESSION  1.  Hip pain  2.   3.         Electronically signed by: Romain Dent M.D., 3/24/2020 6:45 AM

## 2020-04-28 ENCOUNTER — SLEEP CENTER VISIT (OUTPATIENT)
Dept: SLEEP MEDICINE | Facility: MEDICAL CENTER | Age: 60
End: 2020-04-28
Payer: COMMERCIAL

## 2020-04-28 VITALS
HEIGHT: 70 IN | SYSTOLIC BLOOD PRESSURE: 122 MMHG | WEIGHT: 271 LBS | BODY MASS INDEX: 38.8 KG/M2 | HEART RATE: 67 BPM | RESPIRATION RATE: 14 BRPM | DIASTOLIC BLOOD PRESSURE: 82 MMHG | OXYGEN SATURATION: 92 %

## 2020-04-28 DIAGNOSIS — E66.9 OBESITY (BMI 30-39.9): ICD-10-CM

## 2020-04-28 DIAGNOSIS — G47.33 OSA (OBSTRUCTIVE SLEEP APNEA): ICD-10-CM

## 2020-04-28 PROCEDURE — 99204 OFFICE O/P NEW MOD 45 MIN: CPT | Performed by: FAMILY MEDICINE

## 2020-04-28 ASSESSMENT — FIBROSIS 4 INDEX: FIB4 SCORE: 1.48

## 2020-04-28 NOTE — PROGRESS NOTES
"     [facility]  Consult Note     Date: 4/28/2020 / Time: 8:26 AM      Thank you for requesting a sleep medicine consultation on Edd Beal at the sleep center. He presents today with the chief complaints of obstructive sleep apnea. He is referred by Dr. Thornton for evaluation and treatment of sleep disorder breathing. He was diagnosed with JOAQUINA about 25 years ago and has been on the PAP therapy since then. Previous SS is not available to review.    HISTORY OF PRESENT ILLNESS:       At night,  Edd Beal goes to bed around 1 am on weekdays and around on the weekends. He gets out of bed at 8 am on weekdays on the weekends.  He  Averages about 8 hrs of sleep on a good night on a bad night. Fabrice anthony has bad nights nights per week.  He rarely wakes up middle of the. However if he does, it takes him few min to fall back asleep. As per suppose questioner, he has relaspe of snoring and breathing pauses in sleep even with the CPAP.  He  denies any symptoms of restless legs syndrome such as an \"urge to move\"  He  legs in the evening or bedtime. He  denies any symptoms of narcolepsy such as sleep paralysis or cataplexy, or any symptoms to suggest parasomnias such as sleep walking or acting out of dreams. He  has not used any medications for the sleep problem.Overall, he doesnot finds his sleep refreshing. In terms of  excessive daytime sleepiness, he denies of sleepiness while  at work, while reading or watching TV or while driving. [place] sleepiness scale score is normal 6/24.He meeks not take regular naps.     He is currently on CPAP 13-20 cm.The 30 day compliance was downloaded which shows adequate compliance with more that 4 hr usage about 100%. The AHI is has improved to 2.4/hr. The mask leak is normal.       REVIEW OF SYSTEMS:       Constitutional: Denies fevers, Denies weight changes  Eyes: Denies changes in vision, no eye pain  Ears/Nose/Throat/Mouth: Denies nasal congestion or sore throat "   Cardiovascular: Denies chest pain or palpitations   Respiratory: Denies shortness of breath , Denies cough  Gastrointestinal/Hepatic: Denies abdominal pain, nausea, vomiting, diarrhea, constipation or GI bleeding   Genitourinary: Denies bladder dysfunction, dysuria or frequency  Musculoskeletal/Rheum: Denies  joint pain and swelling   Skin/Breast: Denies rash  Neurological: Denies headache, confusion, memory loss or focal weakness/parasthesias  Psychiatric: denies mood disorder     Comprehensive review of systems form is reviewed with the patient and is attached in the EMR.     PMH:  has a past medical history of BPH (benign prostatic hyperplasia), Bronchitis, Chickenpox, Facial basal cell cancer (2007), GERD (gastroesophageal reflux disease), GI bleed, HLD (hyperlipidemia), Kidney stone, and Sleep apnea.  MEDS:   Current Outpatient Medications:   •  rosuvastatin (CRESTOR) 20 MG Tab, Take 20 mg by mouth every bedtime., Disp: , Rfl:   •  naproxen (NAPROSYN) 500 MG Tab, Take 1 Tab by mouth 2 times a day, with meals., Disp: 20 Tab, Rfl: 0  •  cyclobenzaprine (FLEXERIL) 10 MG Tab, Take 1 Tab by mouth 3 times a day as needed for Mild Pain., Disp: 30 Tab, Rfl: 0  •  aspirin EC (ECOTRIN) 81 MG Tablet Delayed Response, Take 81 mg by mouth every bedtime., Disp: , Rfl:   •  Ascorbic Acid (VITAMIN C) 1000 MG Tab, Take 1,000 mg by mouth every bedtime., Disp: , Rfl:   •  Omega-3 Fatty Acids (FISH OIL) 1000 MG Cap capsule, Take 1,000 mg by mouth every bedtime., Disp: , Rfl:   •  pantoprazole (PROTONIX) 40 MG Tablet Delayed Response, Take 40 mg by mouth every bedtime., Disp: , Rfl:   •  tamsulosin (FLOMAX) 0.4 MG capsule, Take 0.4 mg by mouth every bedtime., Disp: , Rfl:   ALLERGIES: No Known Allergies  SURGHX:   Past Surgical History:   Procedure Laterality Date   • EYE SURGERY  09/23/2019    Left eye laser surgery   • PB CYSTOSCOPY,INSERT URETERAL STENT  6/26/2019    Procedure: CYSTOSCOPY- LASER LITHOTRIPSY  FOR URETHERAL  "STONE.;  Surgeon: Dion Olivares M.D.;  Location: SURGERY [facility];  Service: Urology   • ARTHROSCOPY, KNEE     • ATHROPLASTY      right knee   • CARPAL TUNNEL RELEASE     • EYE SURGERY      cataracts bilateral, corneal transplant   • EYE SURGERY      cataract AND corneal replacement BILATERALLY   • HERNIA REPAIR       SOCHX:  reports that he has never smoked. He has never used smokeless tobacco. He reports current alcohol use. He reports that he does not use drugs.   FH:   Family History   Problem Relation Age of Onset   • Cancer Mother 60        colon cancer   • Heart Disease Mother         CHF           Physical Exam:  Vitals/ General Appearance:   Weight/BMI: Body mass index is 38.88 kg/m².  /82 (BP Location: Left arm, Patient Position: Sitting, BP Cuff Size: Adult)   Pulse 67   Resp 14   Ht 1.778 m (5' 10\")   Wt 122.9 kg (271 lb)   SpO2 92%   Vitals:    04/28/20 0830   BP: 122/82   BP Location: Left arm   Patient Position: Sitting   BP Cuff Size: Adult   Pulse: 67   Resp: 14   SpO2: 92%   Weight: 122.9 kg (271 lb)   Height: 1.778 m (5' 10\")       Pt. is alert and oriented to time, place and person. Cooperative and in no apparent distress.       -Head: Atraumatic, normocephalic  -. Nose: No inferior turbinate hypertophy, +septal deviation,   -. Throat: Oropharynx appears crowded in that the palate is overhanging (Malam Hannah scale 4. Tonsils are not enlarged, uvula is large,  -. Neck: Supple. No thyromegaly  -. Chest: Trachea central,  -. Lungs auscultation: B/L good air entry, vesicular breath sounds, no adventitious sounds  -. Heart auscultation: 1st and 2nd heart sounds normal, regular rhythm. No appreciable murmur.  - Extremities: no clubbing, no pedal edema.  - Skin: No rash  - NEUROLOGICAL EXAMINATION: On neurological exam, the patient was alert and oriented x3. speech was clear and fluent without dysarthria.      INVESTIGATIONS:       ASSESSMENT AND PLAN     1. Sleep Apnea .He  Is " currently on CPAP 13-20 with FFM. 30 day compliance was downloaded which shows adequate compliance.     The pathophysiology of sleep anea and the increased risk of cardiovascular morbidity from untreated sleep apnea is discussed in detail with the patient.      He is urged to avoid supine sleep, weight gain and alcoholic beverages since all of these can worsen sleep apnea. He is cautioned against drowsy driving. If He feels sleepy while driving, He must pull over for a break/nap, rather than persist on the road, in the interest of He own safety and that of others on the road.   Plan   - Changing pressure to  15-20  cm with FFM mask    - Split night is ordered to reestablish the diagnosis and to re titrate due to relapse of the symptoms. Order new CPAp after the study   - supplies are refilled    - compliance download was reviewed and discussed with the pt. Previous SS is not available.    - compliance was reinforced     2. Regarding treatment of other past medical problems and general health maintenance,  He is urged to follow up with PCP.

## 2020-05-28 ENCOUNTER — SLEEP STUDY (OUTPATIENT)
Dept: SLEEP MEDICINE | Facility: MEDICAL CENTER | Age: 60
End: 2020-05-28
Attending: FAMILY MEDICINE
Payer: COMMERCIAL

## 2020-05-28 DIAGNOSIS — G47.33 OSA (OBSTRUCTIVE SLEEP APNEA): ICD-10-CM

## 2020-05-29 NOTE — PROCEDURES
Clinical Comments:   The patient underwent a split night polysomnogram with a CPAP titration using the standard montage for measurement of paramaters of sleep, respiratory events, movement abnormalities, heart rate and rhythm. A Microphone was used to monitior snoring.  Interpretation:  Study start time was 09:02:13 PM. Total recording time was 3h 24.5m (204 minutes) with a total sleep time of 2h 5.0m (125 minutes) resulting in a sleep efficiency of 61.12%.  Sleep latency from the start fo the study was 15 minutes minutes and REM latency from sleep onset was 00 minutes minutes.  Respiratory:   There were 66 apneas in total consisting of 11 obstructive apneas, 0 mixed apneas, and 55 central apneas. There were 108 hypopneas in total.  The apnea index was 31.68 per hour and the hypopnea index was 51.84 per hour.  The overall AHI was 83.5, with a REM AHI of 0.00, and a supine AHI of 80.00.  Limb Movements:  There were a total of 22 periodic leg movements, of which 5 were PLMS arousals. This resulted in a PLMS index of 10.6 and a PLMS arousal index of 2.4  Oximetry:  The mean SaO2 was 93.0% for the diagnostic portion of the study, with a minimum SaO2 of 86.0%.   Treatment:  Interpretation:  Treatment recording time was 4h 52.0m (292 minutes) with a total sleep time of 3h 38.0m (218 minutes) resulting in a sleep efficiency of 74.7%.   Sleep latency from the start of treatment was 21 minutes minutes and REM latency from sleep onset was 1h 22.5m minutes.   The patient had 100 arousals in total for an arousal index of 27.5.  Respiratory:   There were 140 apneas in total consisting of 57 obstructive apneas, 83 central apneas, and 0 mixed apneas for an apnea index of 38.53.   The patient had 44 hypopneas in total, which resulted in a hypopnea index of 12.11.   The overall AHI was 50.64, with a REM AHI of 62.47, and a supine AHI of 50.48.   Limb Movements:  There were a total of 7 periodic leg movements, of which 3 were PLMS  arousals. This resulted in a PLMS index of 1.9 and a PLMS arousal index of 0.8.  Oximetry:  The mean SaO2 during treatment was 95.0%, with a minimum oxygen saturation of 82.0%.      CPAP was tried from 8 to 16cm H2O. BiPAP was tried from 20/16 to 21/17cm H2O.    RECORDING TECHNIQUE:       After the scalp was prepared, gold plated electrodes were applied to the scalp according to the International 10-20 System. EEG (electroencephalogram) was continuously monitored from the O1-M2, O2-M1, C3-M2, C4-M1, F3-M2, and F4-M1.   EOGs (electrooculograms) were monitored by electrodes placed at the left and right outer canthi.  Chin EMG (electromyogram) was monitored by electrodes placed on the mentalis and sub-mentalis muscles.  Nasal and oral airflow were monitored using a triple port thermocouple as well as oronasal pressure transducer.  Respiratory effort was measured by inductive plethysmography technology employing abdominal and thoracic belts.  Blood oxygen saturation and pulse were monitored by pulse oximetry.  Heart rhythm was monitored by surface electrocardiogram.  Leg EMG was studied using surface electrodes placed on left and right anterior tibialis.  A microphone was used to monitor tracheal sounds and snoring.  Body position was monitored and documented by technician observation      SUMMARY:    This was an overnight diagnostic polysomnogram with a subsequent positive airway pressure titration, also known as a split- night study.  The patient chose to use a large Vitera mask with heated humidification.    During the diagnostic phase the total recording time was 204 minutes, the sleep period time was 183 minutes, and the total sleep time was 125 minutes.  The patient's sleep efficiency was 61.12% which is reduced.  The patient experienced 0 REM period(s).    The sleep stage durations revealed 64.5 minutes of wake after sleep onset (WASO), 11 minutes of N1 sleep, 112 minutes of N2 sleep, 1 minutes of N3 sleep, and 0  minutes of REM.    The latency to sleep was 15 minutes which is normal.  The latency to REM was not applicable as the patient did not achieve REM.   Severe sleep fragmentation occurred.  The arousal index was 35.  The Total Limb Movement (isolated) Index was 20.6, the Limb Movement with Arousal Index was 7.2, and the PLM Series Index was 10.6.    The patient experienced 55 central and 11 obstructive apneas, 0 central and 108 obstructive hypopneas, 174 apneas and hypopneas, and 0 RERAS.  The apnea hypopnea index was 83.5, the RDI was 83.5, the mean event duration was 23.9 seconds, and the longest event lasted 41.9 seconds.  The REM index was 0 and the supine index was 80.  The apnea hypopnea index represents severe obstructive sleep apnea hypopnea syndrome.    The tim saturation during sleep was 86% and the patient spent 13.9% of the diagnostic recording with saturations less than or equal to 90%.  The less than 60% for 2.4% of the recording likely represents artifact    During sleep, the minimum heart rate was 52 bpm, the mean heart rate was 65 bpm, and the maximum heart rate was 81 bpm.    Once the patient met the split-night protocol, the technician performed a positive airway pressure titration.  The technician initiated treatment with CPAP set at 5 cmH2O and increased the pressure to a maximum of 16 cmH2O. the apnea hypopnea index failed to normalize in any tested CPAP pressure.  The technician then tried the patient on bilevel 20/16 and bilevel 21/17 cm water.  On bilevel 21/17 cm water the patient's AHI improved to 8.57 with a tim saturation of 94% and a mean saturation of 96% the patient was able to achieve a brief amount of supine REM.        ASSESSMENT:    Severe obstructive sleep apnea hypopnea - AHI 83.5  Significant nocturnal desaturation - tim saturation 86% - saturations <90% for 13.9% of the diagnostic recording  Incomplete positive airway pressure titration -patient did best on bilevel 21/17 cm  water with an improvement in the AHI to 8.57, tim saturation 94%, a mean saturation of 96%, and the achievement of supine REM sleep.        RECOMMENDATION:    Consider a dedicated full night positive airway pressure titration beginning with bilevel at 21/17 cm water.  Alternatively a trial of bilevel 22/18 cm water using a large Vitera mask or mask of choice to fit with heated humidification followed by clinical and compliance review in 31-90 days is an acceptable option.

## 2020-06-04 ENCOUNTER — HOSPITAL ENCOUNTER (EMERGENCY)
Facility: MEDICAL CENTER | Age: 60
End: 2020-06-05
Attending: EMERGENCY MEDICINE
Payer: COMMERCIAL

## 2020-06-04 DIAGNOSIS — T30.0 BURN: ICD-10-CM

## 2020-06-04 PROCEDURE — 99282 EMERGENCY DEPT VISIT SF MDM: CPT

## 2020-06-04 PROCEDURE — 303485 HCHG DRESSING MEDIUM

## 2020-06-04 RX ORDER — SULFAMETHOXAZOLE AND TRIMETHOPRIM 400; 80 MG/1; MG/1
1 TABLET ORAL 2 TIMES DAILY
Status: SHIPPED | COMMUNITY
End: 2020-09-30

## 2020-06-04 ASSESSMENT — FIBROSIS 4 INDEX: FIB4 SCORE: 1.48

## 2020-06-05 VITALS
HEART RATE: 72 BPM | HEIGHT: 70 IN | OXYGEN SATURATION: 93 % | DIASTOLIC BLOOD PRESSURE: 77 MMHG | WEIGHT: 266.98 LBS | BODY MASS INDEX: 38.22 KG/M2 | TEMPERATURE: 96.9 F | SYSTOLIC BLOOD PRESSURE: 129 MMHG | RESPIRATION RATE: 17 BRPM

## 2020-06-05 NOTE — ED TRIAGE NOTES
"While removing brennan from the narayan, earlier this evening he dropped hot grease on his left wrist.  C/O a partial thickness injury.   Pt denies any domestic high risk areas, or international travel within the past 14 days.      /83   Pulse 79   Temp 36.1 °C (96.9 °F) (Temporal)   Resp 18   Ht 1.778 m (5' 10\")   Wt 121.1 kg (266 lb 15.6 oz)   SpO2 95%   BMI 38.31 kg/m²   .   "

## 2020-06-05 NOTE — DISCHARGE INSTRUCTIONS
You were seen in the emergency department after suffering a burn.  Your physical exam is reassuring.  The burn appears to be healing as expected.  There are no signs of infection.    Please keep the area clean with gentle soap and water.  Use bacitracin or Neosporin ointment and keep it covered.  It should heal over time.    Please return to the emergency department or seek medical attention if you develop:  Fevers, spreading redness, pus draining from the wound, any other new or concerning findings    ================================  Coronavirus Information    Your visit did NOT relate to coronavirus, but if you or your family develop symptoms that concern you for coronavirus (please see CDC website for symptoms), please contact the [facility] hotline (or your local health department)  or your healthcare provider before going to a medical facility:    [facility]  24hr hotline: 905.733.7452    Information is available from the Centers for Disease Control and Prevention  www.CDC.gov    and     [facility]  [email or web address]    If you are severely ill or having a hard time breathing, please immediatly seek medical care. Notify the  or Emergency Department Triage about your symptoms.

## 2020-08-11 ENCOUNTER — APPOINTMENT (OUTPATIENT)
Dept: SLEEP MEDICINE | Facility: MEDICAL CENTER | Age: 60
End: 2020-08-11
Payer: COMMERCIAL

## 2020-08-12 ENCOUNTER — HOSPITAL ENCOUNTER (OUTPATIENT)
Dept: RADIOLOGY | Facility: MEDICAL CENTER | Age: 60
End: 2020-08-12
Attending: UROLOGY
Payer: COMMERCIAL

## 2020-08-12 DIAGNOSIS — N35.819 OTHER URETHRAL STRICTURE, MALE, UNSPECIFIED SITE: ICD-10-CM

## 2020-08-12 PROCEDURE — 700117 HCHG RX CONTRAST REV CODE 255: Performed by: UROLOGY

## 2020-08-12 PROCEDURE — 74450 X-RAY URETHRA/BLADDER: CPT

## 2020-08-12 RX ADMIN — IOHEXOL 60 ML: 240 INJECTION, SOLUTION INTRATHECAL; INTRAVASCULAR; INTRAVENOUS; ORAL at 13:00

## 2020-09-29 PROCEDURE — 95810 POLYSOM 6/> YRS 4/> PARAM: CPT | Performed by: INTERNAL MEDICINE

## 2020-09-30 ENCOUNTER — OFFICE VISIT (OUTPATIENT)
Dept: PULMONOLOGY | Facility: HOSPICE | Age: 60
End: 2020-09-30
Payer: COMMERCIAL

## 2020-09-30 VITALS
HEART RATE: 87 BPM | WEIGHT: 266 LBS | DIASTOLIC BLOOD PRESSURE: 76 MMHG | RESPIRATION RATE: 16 BRPM | BODY MASS INDEX: 38.08 KG/M2 | OXYGEN SATURATION: 95 % | HEIGHT: 70 IN | SYSTOLIC BLOOD PRESSURE: 144 MMHG

## 2020-09-30 DIAGNOSIS — Z78.9 NONSMOKER: ICD-10-CM

## 2020-09-30 DIAGNOSIS — G47.33 OBSTRUCTIVE SLEEP APNEA SYNDROME: ICD-10-CM

## 2020-09-30 DIAGNOSIS — I10 HYPERTENSION, UNSPECIFIED TYPE: ICD-10-CM

## 2020-09-30 DIAGNOSIS — E66.9 OBESITY (BMI 30-39.9): ICD-10-CM

## 2020-09-30 PROCEDURE — 99214 OFFICE O/P EST MOD 30 MIN: CPT | Performed by: NURSE PRACTITIONER

## 2020-09-30 ASSESSMENT — FIBROSIS 4 INDEX: FIB4 SCORE: 1.48

## 2020-09-30 NOTE — PROGRESS NOTES
Chief Complaint   Patient presents with   • Results     SS        HPI:  Edd Beal is a 59 y.o. year old male here today for follow-up on JOAQUINA and sleep study results.  He is accompanied by his wife Laura.  Established care with Dr. Trejo 4/28/20.    He has been on therapy for >15yrs.  Currently using APAP 15-20cm and device is 20yrs old.   PSG split night 5/28/20 indicates sleep efficiency was 61%, severe sleep apnea with overall AHI of 83.5/h and O2 tim of 86%.  He was titrated on CPAP and BiPAP with an incomplete titration with best response to bilevel 21/17 cm with reduced AHI of 8.57/h and O2 tim of 94%.  Recommendation is beginning bilevel 21/17 cm or 22/18 cm with a large Vitera mask with close follow-up.  I reviewed finds with patient.  He would like to upgrade his device.    He denies cardiac or respiratory symptoms. Wife notes return of sleep symptoms including snoring while using device on his back, falling asleep if sitting upright in chair in front of TV and shallow breathing while sleeping. He does meeks shift work which can also cause fatigue for him but he generally sleeps 8hrs per night.  Pending surgery with urology for removal or urethral scar tissue.    ROS: As per HPI and otherwise negative if not stated.    Past Medical History:   Diagnosis Date   • BPH (benign prostatic hyperplasia)    • Bronchitis    • Chickenpox    • Facial basal cell cancer 2007    lip, no recurrence   • GERD (gastroesophageal reflux disease)    • GI bleed    • HLD (hyperlipidemia)    • Kidney stone    • Sleep apnea        Past Surgical History:   Procedure Laterality Date   • EYE SURGERY  09/23/2019    Left eye laser surgery   • PB CYSTOSCOPY,INSERT URETERAL STENT  6/26/2019    Procedure: CYSTOSCOPY- LASER LITHOTRIPSY  FOR URETHERAL STONE.;  Surgeon: Dion Olivares M.D.;  Location: SURGERY [facility];  Service: Urology   • ARTHROPLASTY      right knee   • ARTHROSCOPY, KNEE     • CARPAL TUNNEL  "RELEASE     • EYE SURGERY      cataracts bilateral, corneal transplant   • EYE SURGERY      cataract AND corneal replacement BILATERALLY   • HERNIA REPAIR         Family History   Problem Relation Age of Onset   • Cancer Mother 60        colon cancer   • Heart Disease Mother         CHF       Social History     Socioeconomic History   • Marital status:      Spouse name: Not on file   • Number of children: Not on file   • Years of education: Not on file   • Highest education level: Not on file   Occupational History   • Not on file   Social Needs   • Financial resource strain: Not on file   • Food insecurity     Worry: Not on file     Inability: Not on file   • Transportation needs     Medical: Not on file     Non-medical: Not on file   Tobacco Use   • Smoking status: Never Smoker   • Smokeless tobacco: Never Used   Substance and Sexual Activity   • Alcohol use: Yes     Comment: Occasionally   • Drug use: No   • Sexual activity: Not on file   Lifestyle   • Physical activity     Days per week: Not on file     Minutes per session: Not on file   • Stress: Not on file   Relationships   • Social connections     Talks on phone: Not on file     Gets together: Not on file     Attends Sabianist service: Not on file     Active member of club or organization: Not on file     Attends meetings of clubs or organizations: Not on file     Relationship status: Not on file   • Intimate partner violence     Fear of current or ex partner: Not on file     Emotionally abused: Not on file     Physically abused: Not on file     Forced sexual activity: Not on file   Other Topics Concern   • Not on file   Social History Narrative   • Not on file       Allergies as of 09/30/2020   • (No Known Allergies)        Vitals:  /76 (BP Location: Left arm, Patient Position: Sitting, BP Cuff Size: Adult)   Pulse 87   Resp 16   Ht 1.778 m (5' 10\")   Wt 120.7 kg (266 lb)   SpO2 95%     Current medications as of today   Current Outpatient " Medications   Medication Sig Dispense Refill   • sulfamethoxazole-trimethoprim (BACTRIM) 400-80 MG Tab Take 1 Tab by mouth 2 times a day.     • rosuvastatin (CRESTOR) 20 MG Tab Take 20 mg by mouth every bedtime.     • naproxen (NAPROSYN) 500 MG Tab Take 1 Tab by mouth 2 times a day, with meals. 20 Tab 0   • cyclobenzaprine (FLEXERIL) 10 MG Tab Take 1 Tab by mouth 3 times a day as needed for Mild Pain. 30 Tab 0   • aspirin EC (ECOTRIN) 81 MG Tablet Delayed Response Take 81 mg by mouth every bedtime.     • Ascorbic Acid (VITAMIN C) 1000 MG Tab Take 1,000 mg by mouth every bedtime.     • Omega-3 Fatty Acids (FISH OIL) 1000 MG Cap capsule Take 1,000 mg by mouth every bedtime.     • pantoprazole (PROTONIX) 40 MG Tablet Delayed Response Take 40 mg by mouth every bedtime.     • tamsulosin (FLOMAX) 0.4 MG capsule Take 0.4 mg by mouth every bedtime.       No current facility-administered medications for this visit.          Physical Exam:   Gen:           Alert and oriented, No apparent distress. Mood and affect appropriate, normal interaction with examiner.  Eyes:          PERRL, EOM intact, sclere white, conjunctive moist.  Ears:          Not examined.   Hearing:     Grossly intact.  Nose:          Normal, no lesions or deformities.  Dentition:    Good dentition.  Oropharynx:   mask  Mallampati Classification: mask  Neck:        Supple, trachea midline, no masses.  Respiratory Effort: No intercostal retractions or use of accessory muscles.   Lung Auscultation:      Clear to auscultation bilaterally; no rales, rhonchi or wheezing.  CV:            Regular rate and rhythm. No murmurs, rubs or gallops.  Abd:           Not examined.   Lymphadenopathy: Not examined.  Gait and Station: Normal.  Digits and Nails: No clubbing, cyanosis, petechiae, or nodes.   Cranial Nerves: II-XII grossly intact.  Skin:        No rashes, lesions or ulcers noted.               Ext:           No cyanosis or edema.      Assessment:  1. Obstructive  sleep apnea syndrome     2. Obesity (BMI 30-39.9)  Height And Weight   3. Hypertension, unspecified type     4. Nonsmoker         Immunizations:    Flu:recommend  Pneumovax 23:not due  Prevnar 13:not due    Plan:  1. JOAQUINA is not well controlled and sleep study confirms continued need for treatment.  DME BIPAP 21/17cm.  Patient understands they may have mask fits within first 30 days of therapy covered by insurance to obtain best fit.  Patient understands the need to use device every night for >4hrs to meet compliance standards for insurance purposes.  2. Discussed sleep hygiene  3. Encouraged weight loss through diet/exercise  4. F/u with PCP for other health concerns  5. F/u in 3 mos for compliance check on new device, sooner if needed.    Please note that this dictation was created using voice recognition software. I have made every reasonable attempt to correct obvious errors, but it is possible there are errors of grammar and possibly content that I did not discover before finalizing the note.

## 2020-09-30 NOTE — PATIENT INSTRUCTIONS
Patient understands the need to use device every night for >4hrs to meet compliance standards for insurance purposes.    Patient understands they may have mask fits within first 30 days of therapy covered by insurance to obtain best fit.

## 2020-10-06 ENCOUNTER — HOSPITAL ENCOUNTER (OUTPATIENT)
Dept: LAB | Facility: MEDICAL CENTER | Age: 60
End: 2020-10-06
Attending: UROLOGY
Payer: COMMERCIAL

## 2020-10-06 LAB
ANION GAP SERPL CALC-SCNC: 10 MMOL/L (ref 7–16)
BASOPHILS # BLD AUTO: 0.5 % (ref 0–1.8)
BASOPHILS # BLD: 0.03 K/UL (ref 0–0.12)
BUN SERPL-MCNC: 14 MG/DL (ref 8–22)
CALCIUM SERPL-MCNC: 9.1 MG/DL (ref 8.5–10.5)
CHLORIDE SERPL-SCNC: 104 MMOL/L (ref 96–112)
CO2 SERPL-SCNC: 23 MMOL/L (ref 20–33)
CREAT SERPL-MCNC: 0.87 MG/DL (ref 0.5–1.4)
EOSINOPHIL # BLD AUTO: 0.13 K/UL (ref 0–0.51)
EOSINOPHIL NFR BLD: 2 % (ref 0–6.9)
ERYTHROCYTE [DISTWIDTH] IN BLOOD BY AUTOMATED COUNT: 41.8 FL (ref 35.9–50)
EST. AVERAGE GLUCOSE BLD GHB EST-MCNC: 108 MG/DL
GLUCOSE SERPL-MCNC: 124 MG/DL (ref 65–99)
HBA1C MFR BLD: 5.4 % (ref 0–5.6)
HCT VFR BLD AUTO: 48.6 % (ref 42–52)
HGB BLD-MCNC: 16.8 G/DL (ref 14–18)
IMM GRANULOCYTES # BLD AUTO: 0.01 K/UL (ref 0–0.11)
IMM GRANULOCYTES NFR BLD AUTO: 0.2 % (ref 0–0.9)
LYMPHOCYTES # BLD AUTO: 2.4 K/UL (ref 1–4.8)
LYMPHOCYTES NFR BLD: 36.4 % (ref 22–41)
MCH RBC QN AUTO: 31.7 PG (ref 27–33)
MCHC RBC AUTO-ENTMCNC: 34.6 G/DL (ref 33.7–35.3)
MCV RBC AUTO: 91.7 FL (ref 81.4–97.8)
MONOCYTES # BLD AUTO: 0.46 K/UL (ref 0–0.85)
MONOCYTES NFR BLD AUTO: 7 % (ref 0–13.4)
NEUTROPHILS # BLD AUTO: 3.57 K/UL (ref 1.82–7.42)
NEUTROPHILS NFR BLD: 53.9 % (ref 44–72)
NRBC # BLD AUTO: 0 K/UL
NRBC BLD-RTO: 0 /100 WBC
PLATELET # BLD AUTO: 171 K/UL (ref 164–446)
PMV BLD AUTO: 10.5 FL (ref 9–12.9)
POTASSIUM SERPL-SCNC: 4 MMOL/L (ref 3.6–5.5)
RBC # BLD AUTO: 5.3 M/UL (ref 4.7–6.1)
SODIUM SERPL-SCNC: 137 MMOL/L (ref 135–145)
WBC # BLD AUTO: 6.6 K/UL (ref 4.8–10.8)

## 2020-10-06 PROCEDURE — 87086 URINE CULTURE/COLONY COUNT: CPT

## 2020-10-06 PROCEDURE — 83036 HEMOGLOBIN GLYCOSYLATED A1C: CPT

## 2020-10-06 PROCEDURE — 80048 BASIC METABOLIC PNL TOTAL CA: CPT

## 2020-10-06 PROCEDURE — 36415 COLL VENOUS BLD VENIPUNCTURE: CPT

## 2020-10-06 PROCEDURE — 85025 COMPLETE CBC W/AUTO DIFF WBC: CPT

## 2020-10-09 LAB
BACTERIA UR CULT: NORMAL
SIGNIFICANT IND 70042: NORMAL
SITE SITE: NORMAL
SOURCE SOURCE: NORMAL

## 2020-10-19 ENCOUNTER — PRE-ADMISSION TESTING (OUTPATIENT)
Dept: ADMISSIONS | Facility: MEDICAL CENTER | Age: 60
End: 2020-10-19
Attending: UROLOGY
Payer: COMMERCIAL

## 2020-10-19 DIAGNOSIS — Z01.812 PRE-OPERATIVE LABORATORY EXAMINATION: ICD-10-CM

## 2020-10-19 DIAGNOSIS — Z01.810 PRE-OPERATIVE CARDIOVASCULAR EXAMINATION: ICD-10-CM

## 2020-10-19 LAB
COVID ORDER STATUS COVID19: NORMAL
EKG IMPRESSION: NORMAL
SARS-COV-2 RNA RESP QL NAA+PROBE: NOTDETECTED
SPECIMEN SOURCE: NORMAL

## 2020-10-19 PROCEDURE — 93005 ELECTROCARDIOGRAM TRACING: CPT

## 2020-10-19 PROCEDURE — U0003 INFECTIOUS AGENT DETECTION BY NUCLEIC ACID (DNA OR RNA); SEVERE ACUTE RESPIRATORY SYNDROME CORONAVIRUS 2 (SARS-COV-2) (CORONAVIRUS DISEASE [COVID-19]), AMPLIFIED PROBE TECHNIQUE, MAKING USE OF HIGH THROUGHPUT TECHNOLOGIES AS DESCRIBED BY CMS-2020-01-R: HCPCS

## 2020-10-19 PROCEDURE — 93010 ELECTROCARDIOGRAM REPORT: CPT | Performed by: INTERNAL MEDICINE

## 2020-10-19 ASSESSMENT — FIBROSIS 4 INDEX: FIB4 SCORE: 1.44

## 2020-10-23 ENCOUNTER — ANESTHESIA EVENT (OUTPATIENT)
Dept: SURGERY | Facility: MEDICAL CENTER | Age: 60
End: 2020-10-23
Payer: COMMERCIAL

## 2020-10-23 ENCOUNTER — HOSPITAL ENCOUNTER (OUTPATIENT)
Facility: MEDICAL CENTER | Age: 60
End: 2020-10-23
Attending: UROLOGY | Admitting: UROLOGY
Payer: COMMERCIAL

## 2020-10-23 ENCOUNTER — ANESTHESIA (OUTPATIENT)
Dept: SURGERY | Facility: MEDICAL CENTER | Age: 60
End: 2020-10-23
Payer: COMMERCIAL

## 2020-10-23 VITALS
BODY MASS INDEX: 37.75 KG/M2 | HEART RATE: 73 BPM | WEIGHT: 263.67 LBS | SYSTOLIC BLOOD PRESSURE: 135 MMHG | OXYGEN SATURATION: 94 % | RESPIRATION RATE: 15 BRPM | TEMPERATURE: 98.2 F | HEIGHT: 70 IN | DIASTOLIC BLOOD PRESSURE: 69 MMHG

## 2020-10-23 DIAGNOSIS — N35.912 BULBOUS URETHRAL STRICTURE: Primary | ICD-10-CM

## 2020-10-23 LAB — PATHOLOGY CONSULT NOTE: NORMAL

## 2020-10-23 PROCEDURE — 700102 HCHG RX REV CODE 250 W/ 637 OVERRIDE(OP): Performed by: ANESTHESIOLOGY

## 2020-10-23 PROCEDURE — 88305 TISSUE EXAM BY PATHOLOGIST: CPT

## 2020-10-23 PROCEDURE — A9270 NON-COVERED ITEM OR SERVICE: HCPCS

## 2020-10-23 PROCEDURE — 500892 HCHG PACK, PERI-GYN: Performed by: UROLOGY

## 2020-10-23 PROCEDURE — 160048 HCHG OR STATISTICAL LEVEL 1-5: Performed by: UROLOGY

## 2020-10-23 PROCEDURE — A9270 NON-COVERED ITEM OR SERVICE: HCPCS | Performed by: ANESTHESIOLOGY

## 2020-10-23 PROCEDURE — 160029 HCHG SURGERY MINUTES - 1ST 30 MINS LEVEL 4: Performed by: UROLOGY

## 2020-10-23 PROCEDURE — 160035 HCHG PACU - 1ST 60 MINS PHASE I: Performed by: UROLOGY

## 2020-10-23 PROCEDURE — 700111 HCHG RX REV CODE 636 W/ 250 OVERRIDE (IP): Performed by: ANESTHESIOLOGY

## 2020-10-23 PROCEDURE — 160047 HCHG PACU  - EA ADDL 30 MINS PHASE II: Performed by: UROLOGY

## 2020-10-23 PROCEDURE — 700102 HCHG RX REV CODE 250 W/ 637 OVERRIDE(OP): Performed by: UROLOGY

## 2020-10-23 PROCEDURE — 501330 HCHG SET, CYSTO IRRIG TUBING: Performed by: UROLOGY

## 2020-10-23 PROCEDURE — 700101 HCHG RX REV CODE 250: Performed by: ANESTHESIOLOGY

## 2020-10-23 PROCEDURE — 160036 HCHG PACU - EA ADDL 30 MINS PHASE I: Performed by: UROLOGY

## 2020-10-23 PROCEDURE — 500042 HCHG BAG, URINARY DRAINAGE (CLOSED): Performed by: UROLOGY

## 2020-10-23 PROCEDURE — 700101 HCHG RX REV CODE 250: Performed by: UROLOGY

## 2020-10-23 PROCEDURE — A6403 STERILE GAUZE>16 <= 48 SQ IN: HCPCS | Performed by: UROLOGY

## 2020-10-23 PROCEDURE — 160046 HCHG PACU - 1ST 60 MINS PHASE II: Performed by: UROLOGY

## 2020-10-23 PROCEDURE — 500380 HCHG DRAIN, PENROSE 1/4X12: Performed by: UROLOGY

## 2020-10-23 PROCEDURE — 160025 RECOVERY II MINUTES (STATS): Performed by: UROLOGY

## 2020-10-23 PROCEDURE — 160002 HCHG RECOVERY MINUTES (STAT): Performed by: UROLOGY

## 2020-10-23 PROCEDURE — 700102 HCHG RX REV CODE 250 W/ 637 OVERRIDE(OP)

## 2020-10-23 PROCEDURE — 160009 HCHG ANES TIME/MIN: Performed by: UROLOGY

## 2020-10-23 PROCEDURE — A4344 CATH INDW FOLEY 2 WAY SILICN: HCPCS | Performed by: UROLOGY

## 2020-10-23 PROCEDURE — 160041 HCHG SURGERY MINUTES - EA ADDL 1 MIN LEVEL 4: Performed by: UROLOGY

## 2020-10-23 PROCEDURE — 501838 HCHG SUTURE GENERAL: Performed by: UROLOGY

## 2020-10-23 PROCEDURE — A9270 NON-COVERED ITEM OR SERVICE: HCPCS | Performed by: UROLOGY

## 2020-10-23 PROCEDURE — 700105 HCHG RX REV CODE 258: Performed by: UROLOGY

## 2020-10-23 RX ORDER — HYDROMORPHONE HYDROCHLORIDE 2 MG/ML
INJECTION, SOLUTION INTRAMUSCULAR; INTRAVENOUS; SUBCUTANEOUS PRN
Status: DISCONTINUED | OUTPATIENT
Start: 2020-10-23 | End: 2020-10-23 | Stop reason: SURG

## 2020-10-23 RX ORDER — ONDANSETRON 2 MG/ML
4 INJECTION INTRAMUSCULAR; INTRAVENOUS
Status: COMPLETED | OUTPATIENT
Start: 2020-10-23 | End: 2020-10-23

## 2020-10-23 RX ORDER — PHENAZOPYRIDINE HYDROCHLORIDE 100 MG/1
200 TABLET, FILM COATED ORAL
Status: DISCONTINUED | OUTPATIENT
Start: 2020-10-23 | End: 2020-10-23 | Stop reason: HOSPADM

## 2020-10-23 RX ORDER — LIDOCAINE HYDROCHLORIDE 20 MG/ML
INJECTION, SOLUTION EPIDURAL; INFILTRATION; INTRACAUDAL; PERINEURAL PRN
Status: DISCONTINUED | OUTPATIENT
Start: 2020-10-23 | End: 2020-10-23 | Stop reason: SURG

## 2020-10-23 RX ORDER — OXYCODONE HCL 5 MG/5 ML
10 SOLUTION, ORAL ORAL
Status: COMPLETED | OUTPATIENT
Start: 2020-10-23 | End: 2020-10-23

## 2020-10-23 RX ORDER — ONDANSETRON 2 MG/ML
INJECTION INTRAMUSCULAR; INTRAVENOUS PRN
Status: DISCONTINUED | OUTPATIENT
Start: 2020-10-23 | End: 2020-10-23 | Stop reason: SURG

## 2020-10-23 RX ORDER — CEFTRIAXONE 1 G/1
INJECTION, POWDER, FOR SOLUTION INTRAMUSCULAR; INTRAVENOUS PRN
Status: DISCONTINUED | OUTPATIENT
Start: 2020-10-23 | End: 2020-10-23 | Stop reason: SURG

## 2020-10-23 RX ORDER — POLYETHYLENE GLYCOL 3350 17 G/17G
17 POWDER, FOR SOLUTION ORAL DAILY
Qty: 30 EACH | Refills: 3 | Status: SHIPPED | OUTPATIENT
Start: 2020-10-23 | End: 2020-11-22

## 2020-10-23 RX ORDER — DEXAMETHASONE SODIUM PHOSPHATE 4 MG/ML
INJECTION, SOLUTION INTRA-ARTICULAR; INTRALESIONAL; INTRAMUSCULAR; INTRAVENOUS; SOFT TISSUE PRN
Status: DISCONTINUED | OUTPATIENT
Start: 2020-10-23 | End: 2020-10-23 | Stop reason: SURG

## 2020-10-23 RX ORDER — HYDROMORPHONE HYDROCHLORIDE 1 MG/ML
0.4 INJECTION, SOLUTION INTRAMUSCULAR; INTRAVENOUS; SUBCUTANEOUS
Status: DISCONTINUED | OUTPATIENT
Start: 2020-10-23 | End: 2020-10-23 | Stop reason: HOSPADM

## 2020-10-23 RX ORDER — LABETALOL HYDROCHLORIDE 5 MG/ML
5 INJECTION, SOLUTION INTRAVENOUS
Status: DISCONTINUED | OUTPATIENT
Start: 2020-10-23 | End: 2020-10-23 | Stop reason: HOSPADM

## 2020-10-23 RX ORDER — OXYBUTYNIN CHLORIDE 10 MG/1
10 TABLET, EXTENDED RELEASE ORAL
Qty: 21 TAB | Refills: 1 | Status: SHIPPED | OUTPATIENT
Start: 2020-10-23 | End: 2021-10-14

## 2020-10-23 RX ORDER — HALOPERIDOL 5 MG/ML
1 INJECTION INTRAMUSCULAR
Status: DISCONTINUED | OUTPATIENT
Start: 2020-10-23 | End: 2020-10-23 | Stop reason: HOSPADM

## 2020-10-23 RX ORDER — OXYCODONE HYDROCHLORIDE 5 MG/1
5 TABLET ORAL EVERY 4 HOURS PRN
Qty: 15 TAB | Refills: 0 | Status: SHIPPED | OUTPATIENT
Start: 2020-10-23 | End: 2020-10-30

## 2020-10-23 RX ORDER — HYDROMORPHONE HYDROCHLORIDE 1 MG/ML
0.2 INJECTION, SOLUTION INTRAMUSCULAR; INTRAVENOUS; SUBCUTANEOUS
Status: DISCONTINUED | OUTPATIENT
Start: 2020-10-23 | End: 2020-10-23 | Stop reason: HOSPADM

## 2020-10-23 RX ORDER — IBUPROFEN 200 MG
CAPSULE ORAL
Status: DISCONTINUED | OUTPATIENT
Start: 2020-10-23 | End: 2020-10-23 | Stop reason: HOSPADM

## 2020-10-23 RX ORDER — SULFAMETHOXAZOLE AND TRIMETHOPRIM 800; 160 MG/1; MG/1
1 TABLET ORAL 2 TIMES DAILY
Qty: 6 TAB | Refills: 0 | Status: SHIPPED | OUTPATIENT
Start: 2020-10-23 | End: 2020-10-26

## 2020-10-23 RX ORDER — ROCURONIUM BROMIDE 10 MG/ML
INJECTION, SOLUTION INTRAVENOUS PRN
Status: DISCONTINUED | OUTPATIENT
Start: 2020-10-23 | End: 2020-10-23 | Stop reason: SURG

## 2020-10-23 RX ORDER — BUPIVACAINE HYDROCHLORIDE AND EPINEPHRINE 5; 5 MG/ML; UG/ML
INJECTION, SOLUTION EPIDURAL; INTRACAUDAL; PERINEURAL
Status: DISCONTINUED | OUTPATIENT
Start: 2020-10-23 | End: 2020-10-23 | Stop reason: HOSPADM

## 2020-10-23 RX ORDER — SUCCINYLCHOLINE/SOD CL,ISO/PF 200MG/10ML
SYRINGE (ML) INTRAVENOUS PRN
Status: DISCONTINUED | OUTPATIENT
Start: 2020-10-23 | End: 2020-10-23 | Stop reason: SURG

## 2020-10-23 RX ORDER — MAGNESIUM SULFATE HEPTAHYDRATE 500 MG/ML
INJECTION, SOLUTION INTRAMUSCULAR; INTRAVENOUS PRN
Status: DISCONTINUED | OUTPATIENT
Start: 2020-10-23 | End: 2020-10-23 | Stop reason: SURG

## 2020-10-23 RX ORDER — MEPERIDINE HYDROCHLORIDE 25 MG/ML
12.5 INJECTION INTRAMUSCULAR; INTRAVENOUS; SUBCUTANEOUS
Status: DISCONTINUED | OUTPATIENT
Start: 2020-10-23 | End: 2020-10-23 | Stop reason: HOSPADM

## 2020-10-23 RX ORDER — OXYCODONE HCL 5 MG/5 ML
5 SOLUTION, ORAL ORAL
Status: COMPLETED | OUTPATIENT
Start: 2020-10-23 | End: 2020-10-23

## 2020-10-23 RX ORDER — MIDAZOLAM HYDROCHLORIDE 1 MG/ML
1 INJECTION INTRAMUSCULAR; INTRAVENOUS
Status: DISCONTINUED | OUTPATIENT
Start: 2020-10-23 | End: 2020-10-23 | Stop reason: HOSPADM

## 2020-10-23 RX ORDER — SODIUM CHLORIDE, SODIUM LACTATE, POTASSIUM CHLORIDE, CALCIUM CHLORIDE 600; 310; 30; 20 MG/100ML; MG/100ML; MG/100ML; MG/100ML
INJECTION, SOLUTION INTRAVENOUS CONTINUOUS
Status: DISCONTINUED | OUTPATIENT
Start: 2020-10-23 | End: 2020-10-23 | Stop reason: HOSPADM

## 2020-10-23 RX ORDER — KETAMINE HYDROCHLORIDE 50 MG/ML
INJECTION, SOLUTION INTRAMUSCULAR; INTRAVENOUS PRN
Status: DISCONTINUED | OUTPATIENT
Start: 2020-10-23 | End: 2020-10-23 | Stop reason: HOSPADM

## 2020-10-23 RX ORDER — HYDROMORPHONE HYDROCHLORIDE 1 MG/ML
0.1 INJECTION, SOLUTION INTRAMUSCULAR; INTRAVENOUS; SUBCUTANEOUS
Status: DISCONTINUED | OUTPATIENT
Start: 2020-10-23 | End: 2020-10-23 | Stop reason: HOSPADM

## 2020-10-23 RX ORDER — DIPHENHYDRAMINE HYDROCHLORIDE 50 MG/ML
12.5 INJECTION INTRAMUSCULAR; INTRAVENOUS
Status: DISCONTINUED | OUTPATIENT
Start: 2020-10-23 | End: 2020-10-23 | Stop reason: HOSPADM

## 2020-10-23 RX ADMIN — ONDANSETRON 4 MG: 2 INJECTION INTRAMUSCULAR; INTRAVENOUS at 14:23

## 2020-10-23 RX ADMIN — FENTANYL CITRATE 50 MCG: 50 INJECTION, SOLUTION INTRAMUSCULAR; INTRAVENOUS at 13:50

## 2020-10-23 RX ADMIN — POVIDONE IODINE 15 ML: 100 SOLUTION TOPICAL at 06:18

## 2020-10-23 RX ADMIN — MAGNESIUM SULFATE HEPTAHYDRATE 2 G: 500 INJECTION, SOLUTION INTRAMUSCULAR; INTRAVENOUS at 07:42

## 2020-10-23 RX ADMIN — ROCURONIUM BROMIDE 50 MG: 10 INJECTION, SOLUTION INTRAVENOUS at 07:44

## 2020-10-23 RX ADMIN — HALOPERIDOL LACTATE 1 MG: 5 INJECTION, SOLUTION INTRAMUSCULAR at 17:09

## 2020-10-23 RX ADMIN — KETAMINE HYDROCHLORIDE 25 MG: 50 INJECTION INTRAMUSCULAR; INTRAVENOUS at 10:59

## 2020-10-23 RX ADMIN — PROPOFOL 150 MG: 10 INJECTION, EMULSION INTRAVENOUS at 07:42

## 2020-10-23 RX ADMIN — OXYCODONE HYDROCHLORIDE 10 MG: 5 SOLUTION ORAL at 13:18

## 2020-10-23 RX ADMIN — LIDOCAINE HYDROCHLORIDE 5 ML: 20 INJECTION, SOLUTION EPIDURAL; INFILTRATION; INTRACAUDAL at 07:42

## 2020-10-23 RX ADMIN — LIDOCAINE HYDROCHLORIDE 0.5 ML: 10 INJECTION, SOLUTION EPIDURAL; INFILTRATION; INTRACAUDAL at 06:30

## 2020-10-23 RX ADMIN — ROCURONIUM BROMIDE 20 MG: 10 INJECTION, SOLUTION INTRAVENOUS at 08:53

## 2020-10-23 RX ADMIN — SODIUM CHLORIDE, POTASSIUM CHLORIDE, SODIUM LACTATE AND CALCIUM CHLORIDE: 600; 310; 30; 20 INJECTION, SOLUTION INTRAVENOUS at 06:29

## 2020-10-23 RX ADMIN — KETAMINE HYDROCHLORIDE 50 MG: 50 INJECTION INTRAMUSCULAR; INTRAVENOUS at 08:20

## 2020-10-23 RX ADMIN — ONDANSETRON 4 MG: 2 INJECTION INTRAMUSCULAR; INTRAVENOUS at 07:42

## 2020-10-23 RX ADMIN — Medication 120 MG: at 07:42

## 2020-10-23 RX ADMIN — DEXAMETHASONE SODIUM PHOSPHATE 8 MG: 4 INJECTION, SOLUTION INTRA-ARTICULAR; INTRALESIONAL; INTRAMUSCULAR; INTRAVENOUS; SOFT TISSUE at 07:42

## 2020-10-23 RX ADMIN — KETAMINE HYDROCHLORIDE 25 MG: 50 INJECTION INTRAMUSCULAR; INTRAVENOUS at 09:36

## 2020-10-23 RX ADMIN — ROCURONIUM BROMIDE 30 MG: 10 INJECTION, SOLUTION INTRAVENOUS at 09:35

## 2020-10-23 RX ADMIN — HYDROMORPHONE HYDROCHLORIDE 0.5 MG: 2 INJECTION, SOLUTION INTRAMUSCULAR; INTRAVENOUS; SUBCUTANEOUS at 07:55

## 2020-10-23 RX ADMIN — ROCURONIUM BROMIDE 50 MG: 10 INJECTION, SOLUTION INTRAVENOUS at 08:20

## 2020-10-23 RX ADMIN — FENTANYL CITRATE 50 MCG: 50 INJECTION, SOLUTION INTRAMUSCULAR; INTRAVENOUS at 13:35

## 2020-10-23 RX ADMIN — CEFTRIAXONE SODIUM 1 G: 1 INJECTION, POWDER, FOR SOLUTION INTRAMUSCULAR; INTRAVENOUS at 07:46

## 2020-10-23 RX ADMIN — HYDROMORPHONE HYDROCHLORIDE 0.5 MG: 2 INJECTION, SOLUTION INTRAMUSCULAR; INTRAVENOUS; SUBCUTANEOUS at 08:18

## 2020-10-23 ASSESSMENT — PAIN DESCRIPTION - PAIN TYPE
TYPE: SURGICAL PAIN

## 2020-10-23 ASSESSMENT — PAIN SCALES - GENERAL: PAIN_LEVEL: 3

## 2020-10-23 ASSESSMENT — FIBROSIS 4 INDEX: FIB4 SCORE: 1.44

## 2020-10-23 NOTE — ANESTHESIA POSTPROCEDURE EVALUATION
Patient: Edd Mccormick Vinton    Procedure Summary     Date: 10/23/20 Room / Location: [facility] OR 04 / SURGERY [facility]    Anesthesia Start: 0738 Anesthesia Stop: 1133    Procedures:       URETHROPLASTY,STAGE 2,USING BUCCAL MUCOSAL GRAFTONE-STAGE RECONSTRUCTION OF MALE ANTERIOR URETHRA (Perineum)      CYSTOSCOPY WITH LITHOLITHALOPAXY (Bladder) Diagnosis: (OTHER URETHRAL STRICTURE, MALE, UNSPECIFIED SITE)    Surgeon: Tai Morales M.D. Responsible Provider: Marga Castellon M.D.    Anesthesia Type: general ASA Status: 3          Final Anesthesia Type: general  Last vitals  BP   Blood Pressure: 104/60    Temp   36.7 °C (98 °F)    Pulse   Pulse: 67   Resp   19    SpO2   91 %      Anesthesia Post Evaluation    Patient participation: complete - patient participated  Level of consciousness: awake and alert  Pain score: 3    Airway patency: patent  Anesthetic complications: no  Cardiovascular status: adequate and hemodynamically stable  Respiratory status: acceptable  Hydration status: acceptable    PONV: none           Nurse Pain Score: 3 (NPRS)

## 2020-10-23 NOTE — PROGRESS NOTES
Pt in phase 2 of recovery, pt up with assistance and ambulates to recliner, pain is intense at a 6 and pt feels nauseous. Fluids restarted pt repositioned, ice pack given, pt states pain and nausea improving.   Pt requesting to rest.     1545 : wife at bedside, wife states she picked up prescriptions but mouthwash one was missing. Anderson called and updated in the OR.     1600: pt vomiting large amount, declines nausea meds at this time. Pt resting.      1630: Pt states he is feeling better and ready for discharge teaching and instructions.     Hand off Report given to Annmarie XIE.

## 2020-10-23 NOTE — ANESTHESIA TIME REPORT
Anesthesia Start and Stop Event Times     Date Time Event    10/23/2020 0731 Ready for Procedure     0738 Anesthesia Start     1133 Anesthesia Stop        Responsible Staff  10/23/20    Name Role Begin End    Marga Castellon M.D. Anesth 0738 1133        Preop Diagnosis (Free Text):  Pre-op Diagnosis     OTHER URETHRAL STRICTURE, MALE, UNSPECIFIED SITE        Preop Diagnosis (Codes):    Post op Diagnosis  Ureteral stricture      Premium Reason  Non-Premium    Comments:

## 2020-10-23 NOTE — OR NURSING
Pt was sitting up , vomited , pt given zofran pt states feels better, pt cleaned with wipes, bed linen and gown changed.

## 2020-10-23 NOTE — OR NURSING
Patient medicated with oral pain medication for 8/10 pain in his groin, repositioned and site rechecked. Large amount of bloody drainage under him. Site looks intact with no further bleeding. Encouraged deep breathing. Tolerating po well.

## 2020-10-23 NOTE — OR SURGEON
Immediate Post OP Note    PreOp Diagnosis: urethral stricture     PostOp Diagnosis: urethral stricture    Procedure(s):  URETHROPLASTY BUCCAL MUCOSAL GRAFT ONE-STAGE RECONSTRUCTION OF MALE ANTERIOR URETHRA -     CYSTOSCOPY WITH LITHOLITHALOPAXY - Wound Class: Clean Contaminated    Surgeon(s):  KIMBERLYN Santoyo M.D. (assistant)     Anesthesiologist/Type of Anesthesia:  Anesthesiologist: Marga Castellon M.D./General    Surgical Staff:  Circulator: Qian Solitario R.N.  Monitoring Nurse: Lis Whatley R.N.  Relief Circulator: Tika Horton R.N.  Scrub Person: Alek Lawler  Count Rimrock: Dimitri Quick R.N.    Specimens removed if any:  ID Type Source Tests Collected by Time Destination   A : URETHRAL STRICTURE Tissue Urethra PATHOLOGY SPECIMEN Tai Morales M.D. 10/23/2020  8:35 AM        Estimated Blood Loss: 200    Findings: 4cm stricture - mid to proximal urethra    Drains: 16fr ipchardo  Disposition: outpatient    Complications: none apparent        10/23/2020 11:30 AM Tai Morales M.D.

## 2020-10-23 NOTE — DISCHARGE INSTRUCTIONS
Urethroplasty with Buccal Graft Discharge Instructions    What is normal after surgery -     -bruising or discoloration of the penis or scrotum     -some swelling of the surgery areas (mouth and behind scrotum)  -passing small blood or blood clots through the catheter or around the catheter near the tip of the penis.    Follow up care:     We usually want to see you in clinic for a wound check at 1 weeks after surgery. Then we will have you back at 2-3 weeks after surgery to remove your catheter.    Activity      -NO strenuous activity or lifting more than than 10 pounds (approximately the weight of a gallon of milk) for 4 weeks.       -NO straddle activities (bicycle, motorcycle, horseback riding, riding lawnmower, etc.) until approved by your doctor. (3 months)    -Walking and climbing stairs are okay. It is important to stand up and walk every one to two hours        INCISION/WOUND CARE:      -You may shower starting the 1st day after surgery. Dry incision well. Cold hair dryer.     -DO NOT soak incisions under water (bath, hot tub, swim, etc.) for 4 weeks after surgery.      -shower 1-2x per day, dry well.     -Your stitches (sutures) will dissolve on their own over time.   You have stitches in the mouth as well.      Mouth care - ice is your friend, 20min on and 20 min off  You may need to have liquid diet for the first few days to few weeks (everyone is different) start eating solids when you are ready  Use magic mouth wash as directed to help with mouth pain.     Pain:     Take Tylenol (Acetaminophen) 500 - 1,000 mg my mouth every 8 hours for baseline pain control. DO NOT take more than 3,000 mg of Tylenol in 24 hours.     Take the stronger pain medication (narcotic prescribed) as needed for breakthrough pain.     Take your prescribed pain medication regularly for the first 2-3 days out of the hospital and then take only as needed for pain.  After several days, start taking the pain medications less  frequently until you are off of them completely. You must be off pain medications before you can start driving again.       Do not drive a car, operate machinery, care for children/dependents, or make important decisions while taking an opioid pain medication, such as the one you may have been prescribed today. Taking this medication in a manner that is different from the prescription instructions can lead to opioid overdose and serious medical complications. After discontinuing the medication, it is not unusual to have symptoms of opioid withdrawal such as irritability, nausea, and/or trouble sleeping. Follow the pharmacist’s instruction for taking narcotics. Take lowest dose possible that manages your pain.*    Bowel Movements:     Constipation after surgery can lead to bleeding and other complications. It is important to take a stool softener/laxative (such as Docusate Sodium and Senna) each day to prevent constipation. Keep taking it until your bowel function is regular and you are off narcotic pain medications.     You may take additional laxatives such as Miralax® or Milk of Magnesia as needed. These are available over the counter.      The mouth wash is available to help with mouth pain.     DO NOT force bowel movements as this may disrupt the surgical incisions.     DO NOT take bulk forming agents (Metamucil, Benefiber, etc.) until you are having regular bowel movements and have stopped taking all narcotic pain medication.     Antibiotic/Anti-Inflammation:     An antibiotic, usually bactrim, is prescribed to start 2 days prior to your catheter being removed. Only 2 total days. Take them as instructed and complete the entire prescribed course.         Contact the Urology Service or go directly to your nearest Emergency Department for:     Fevers greater than 101.5 Fahrenheit     Unable to tolerate food or drink     Persistent nausea or vomiting     Uncontrolled abdominal or flank pain     Shortness of breath  or chest pain     Swelling in the legs (especially if it occurs only in 1 leg) or leg pain     Passing blood clots in urine larger than the size of a dime     Passing urine that looks like diluted ketchup      You are unable to urinate     Follow up care - We usually want to see you in clinic for a wound check around 1 week after surgery.     3a. You have been prescribed oxybutynin prn to help with bladder spasms or burning with urination.     WARNING SIGNS:  Fever greater than 101 degrees Fahrenheit, chills, nausea or vomiting, Large amount of clots in urine that make it difficult to urinate or for urine to drain from pichardo, increasing pain, or abdominal swelling. If you are experiencing these symptoms, call the Urology Clinic or go to your local PCP or emergency room.    It is normal to see blood in your urine for up to 2 weeks even from surgery. The urine may clear up entirely, and then turn bloody again a few days later depending on your activity level; do not be alarmed. However, if you experience severe pain or tenderness, have a lot of increased bleeding, or find that you are unable to urinate because of large clots, please notify your doctor immediately     MEDICAL HELP DURING NORMAL BUSINESS HOURS:  Between the hours of 8 AM and 5 PM, please call [phone] to speak with Dr. Morales's staff.     MEDICAL HELP AFTER HOURS:  If you have a serious emergency such as chest pain, shortness of breath, relentless pain you should call 911. For other urgent problems after hours you may contact the urology physician on call by phoning the [phone]. You may also visit the Emergency room at local hospital for help.     For non-emergent problems such as prescription refills or routine questions, please do your best to contact us during normal business hours. This after-hours number should be used for urgent or emergent questions only.       Tai Morales M.D.   [address] [facility] [place] [address]   [phone]                                   ACTIVITY: Rest and take it easy for the first 24 hours.  A responsible adult is recommended to remain with you during that time.  It is normal to feel sleepy.  We encourage you to not do anything that requires balance, judgment or coordination.    MILD FLU-LIKE SYMPTOMS ARE NORMAL. YOU MAY EXPERIENCE GENERALIZED MUSCLE ACHES, THROAT IRRITATION, HEADACHE AND/OR SOME NAUSEA.    FOR 24 HOURS DO NOT:  Drive, operate machinery or run household appliances.  Drink beer or alcoholic beverages.   Make important decisions or sign legal documents.    SPECIAL INSTRUCTIONS: See above instructions.    DIET: To avoid nausea, slowly advance diet as tolerated, avoiding spicy or greasy foods for the first day.  Add more substantial food to your diet according to your physician's instructions.  Babies can be fed formula or breast milk as soon as they are hungry.  INCREASE FLUIDS AND FIBER TO AVOID CONSTIPATION.    SURGICAL DRESSING/BATHING: -You may shower starting the 1st day after surgery. Dry incision well. Cold hair dryer.     -DO NOT soak incisions under water (bath, hot tub, swim, etc.) for 4 weeks after surgery.      -shower 1-2x per day, dry well.     FOLLOW-UP APPOINTMENT:  A follow-up appointment should be arranged with your doctor in 1-2 weeks call to schedule.    You should CALL YOUR PHYSICIAN if you develop:  Fever greater than 101 degrees F.  Pain not relieved by medication, or persistent nausea or vomiting.  Excessive bleeding (blood soaking through dressing) or unexpected drainage from the wound.  Extreme redness or swelling around the incision site, drainage of pus or foul smelling drainage.  Inability to urinate or empty your bladder within 8 hours.  Problems with breathing or chest pain.    You should call 911 if you develop problems with breathing or chest pain.  If you are unable to contact your doctor or surgical center, you should go to the nearest emergency room or urgent care  center.  Physician's telephone #: [phone]    If any questions arise, call your doctor.  If your doctor is not available, please feel free to call the Surgical Center at [phone]. The Contact Center is open Monday through Friday 7AM to 5PM and may speak to a nurse at [phone], or toll free at [phone].     A registered nurse may call you a few days after your surgery to see how you are doing after your procedure.    MEDICATIONS: Resume taking daily medication.  Take prescribed pain medication with food.  If no medication is prescribed, you may take non-aspirin pain medication if needed.  PAIN MEDICATION CAN BE VERY CONSTIPATING.  Take a stool softener or laxative such as senokot, pericolace, or milk of magnesia if needed.    Prescriptions at home given.  Last oral pain medication given at 1318 pm .    If your physician has prescribed pain medication that includes Acetaminophen (Tylenol), do not take additional Acetaminophen (Tylenol) while taking the prescribed medication.    Depression / Suicide Risk    As you are discharged from this [facility] facility, it is important to learn how to keep safe from harming yourself.    Recognize the warning signs:  · Abrupt changes in personality, positive or negative- including increase in energy   · Giving away possessions  · Change in eating patterns- significant weight changes-  positive or negative  · Change in sleeping patterns- unable to sleep or sleeping all the time   · Unwillingness or inability to communicate  · Depression  · Unusual sadness, discouragement and loneliness  · Talk of wanting to die  · Neglect of personal appearance   · Rebelliousness- reckless behavior  · Withdrawal from people/activities they love  · Confusion- inability to concentrate     If you or a loved one observes any of these behaviors or has concerns about self-harm, here's what you can do:  · Talk about it- your feelings and reasons for harming yourself  · Remove any  means that you might use to hurt yourself (examples: pills, rope, extension cords, firearm)  · Get professional help from the community (Mental Health, Substance Abuse, psychological counseling)  · Do not be alone:Call your Safe Contact- someone whom you trust who will be there for you.  · Call your local CRISIS HOTLINE 473-3537 or 262-424-3691  · Call your local Children's Mobile Crisis Response Team [place] (683) 348-6877 or www.Dada  · Call the toll free National Suicide Prevention Hotlines   · National Suicide Prevention Lifeline 118-916-NIJI (9860)  · National Hope Line Network 800-SUICIDE (854-4085)

## 2020-10-23 NOTE — ANESTHESIA PROCEDURE NOTES
Airway    Date/Time: 10/23/2020 7:43 AM  Performed by: Marga Castellon M.D.  Authorized by: Marga Castellon M.D.     Location:  OR  Urgency:  Elective  Difficult Airway: No    Indications for Airway Management:  Anesthesia      Spontaneous Ventilation: absent    Sedation Level:  Deep  Preoxygenated: Yes    Patient Position:  Sniffing  Mask Difficulty Assessment:  0 - not attempted  Final Airway Type:  Endotracheal airway  Final Endotracheal Airway:  ETT  Cuffed: Yes    Technique Used for Successful ETT Placement:  Direct laryngoscopy  Devices/Methods Used in Placement:  Intubating stylet    Insertion Site:  Oral  Blade Type:  Alvares  Laryngoscope Blade/Videolaryngoscope Blade Size:  2  ETT Size (mm):  7.5  Measured from:  Lips  ETT to Lips (cm):  22  Placement Verified by: capnometry    Cormack-Lehane Classification:  Grade I - full view of glottis  Number of Attempts at Approach:  1

## 2020-10-23 NOTE — OP REPORT
PreOp Diagnosis: bulbar urethral stricture         PostOp Diagnosis: bulbar urethral stricture     Surgeon(s):  KIMBERLYN Santoyo M.D. (assistant)      Anesthesiologist/Type of Anesthesia:  Anesthesiologist: Marga Castellon M.D./General     Surgical Staff:  Circulator: Qian Solitario R.N.  Monitoring Nurse: Lis Whatley R.N.  Relief Circulator: Tika Horton R.N.  Scrub Person: Alek Lawler  Count [place]: Dimitri Quick R.N.     Specimens removed if any:  ID Type Source Tests Collected by Time Destination   A : URETHRAL STRICTURE Tissue Urethra PATHOLOGY SPECIMEN Tai Morales M.D. 10/23/2020  8:35 AM           Estimated Blood Loss: 200     Findings: 4cm stricture - mid to proximal urethra     Drains: 16fr picahrdo  Disposition: outpatient      PROCEDURE:  (1) Perineal urethroplasty - dorsal buccal graft 99056  (2) Buccal graft harvest 6.5 x 2 cm left side 10629  (3) Cystoscopy 52657  (4) Cystolitholapaxy (removal of stone from bladder)  (5) Partial urethrectomy 81958-34 modifier  (6) local tissue transfer, z-plasty buccal graft donor size complex closure 72932    PRE-OP DIAGNOSIS:  Urethral stricture  POST-OP DIAGNOSIS:   Same  Bladder stone      OPERATIVE INDICATION: The patient has urethral stricture, one previous DVIU, and elects for surgical rather than endoscopic management.     I discussed the benefits, risks, and alternatives with the patient very frankly.  We talked about the risks of infection, pain, bleeding, recurrence of the stricture, need for surgery in the future ( I would estimate the lifelong risk of repeat urethroplasty is about 10% for standard strictures in the bulbar urethra and much higher for strictures that arise elsewhere or are caused by infections and inflammation), serious urinary infections requiring hospitalization, failure to improve voiding, incontinence, post void dribbling as a frequent problem, damage to erections occuring with strictures  that are closer to the prostate as high as 10-20% risk, ejaculation problems or lack of ejaculation, pain with erections or intercourse, problems with healing in the mouth if a buccal graft is used, damage to structures in the mouth, urinary fistula, chronic perineal or scrotal pain from the surgery which most often resolves but in some cases can be permanent and totally disabling, damage to the nerves in the legs or arms which also usually resolves but can be permanent.     We discussed that many of the symptoms of the stricture should go away like genital pain, infections, slow flow, and frequent urination.  About 85% of patients are satisfied with their surgery and would recommend it to others after urethroplasty, usually unsatisfied patients have the stricture recur or have serious sexual side effects.  We discussed our results with urethroplasty surgeries generally which is about 75-85% resolution of the stricture and symptoms depending upon how the success is defined.     The patient expressed a good understanding of these risks and agrees to go forward with surgery.     OPERATION:  URETHRAL EXPOSURE:  The patient was placed in the high lithotomy position with the aid of candy canes and a gel covered bean bag. The lower abdomen, perineum, and genitals were prepped and draped in the usual fashion.      A midline perineal incision was made in the perineum.    Colles fascia and the bulbospongiosus muscle were divided sharply and with bovie cautery.  The bulbar urethra was dissected free of the tendinous attachments to the corporal bodies.  The urethra was opened dorsally onto the catheter tip, which was localized with passage of a red rubber catheter. A one sided urethral dissection was performed on the left side. The urethra was mobilized completely off of the corporal body on this side exposing the bed for the buccal graft.    CYSTOSCOPY with cystolitholapaxy.  A 16 F flexible cystoscope was advanced per  urethra under direct vision through the urethrotomy proximally.  Meatus was in a normal position without abnormality  The urethra was normal wide caliber without stricture.  Bladder: no tumors, stones or other mucosal abnormality  Trabeculation: mild  Diverticulum: none  Ureters: in their normal postion without abdnormality   Prostate:small, non-obstructing  There was squamous metaplasia proximally, spatulation to level of external sphincter  Additional findings: 0.5cm stone identified. This was removed using 1.9 nitinol tipless basket in one piece. Small stones and debris, irrigated with 20fr red rubber    BUCCAL GRAFT HARVEST:  Stay sutures were placed at the vermillion border.  2-0 silk were used. The retractor was placed. The parotid duct was marked at the upper molar line. A rectangular graft was outlined tapered in the posterior mouth.  The underlying tissue was infiltrated with 1% lidocaine with epinephrine.  The graft was excised sharply. The graft was stretched on a silicone block and the underlying tissue was removed down to the shiny lamina propria with sharp dissection. The graft was fenestrated and placed into saline.    Local tissue transfer, z-plasty buccal graft donor size complex closure  The graft site was closed in a Z-plasty fashion with interrupted sutures of 3-0 chromic suture. Hemostasis was achieved with bipolar cautery prior to closure.  The posterior aspect of the closure was left open so underlying blood and fluid could escape the graft site harvest.    Partial Urethrectomy:   1cm of the most dense scar was excised sharply (mucosa only). This was located in the distal bulbar urethra The healthy mucosa on each side was brought back together after partial urethrectomy with interrupted 5-0V sutures.     URETHRAL REPAIR:  The buccal graft was brought into the urethra defect and affixed to the proximal urethrotomy with 5-0 vicryl suture incorporating a small bite of corporal body, buccal graft,  and mucosa of the urethra, in order to spread fix the buccal graft.  Quilting sutures were used to affix the graft edges and the body of the graft to the corporal bodies.  Diluted fibrin glue was sprayed along the graft bed to seal the graft and increase graft take.  The distal urethrotomy after opening the urethra was matured with 5-0 absorbable suture.  The lateral aspects of the graft were sewn to the urethral mucosa with running 5-0 absorbable suture incorporating a small bite of corporal body to fix the graft. A 16 Georgian Abernathy catheter was placed prior to completion of the lateral closure.      Exparel was injected 15cc into the perineum with pudendal block bilaterally and 5cc in the mouth along the buccal site.     Fibrillar was placed into the perineum to aid in hemostasis surrounding the urethra.    The perineum was closed in 3 layers; 2-0 monocryl was used to close the bulbospongiosus muscle and overlying fascia, a second layer of Colle's fascia. Skin was closed with interrupted 4-0 monocryl suture. Bacitracin was applied to the perineal wound.    ANESTHESIA / FLUIDS: Per anesthsia report  BLOOD PRODUCTS: None  COMPLICATIONS: None apparent  DRAINS: (1) 16 F Abernathy catheter  FINDINGS: As above  URETHROPLASTY SPECIFICS: Perineal dorsal buccal graft urethroplasty, measuring 5 cm and abutting the external sphincter. Involving the proximal / mid bulbar urethra.

## 2020-10-24 NOTE — OR NURSING
Reports nausea improved, would like to go home now. Pain 2/10 to perineum, VSS.     Pharmacy did not receive prescription for mouthwash; Dr. Morales notified but pt does not want to wait here for prescription. Elects to go home now and contact MD for it later.    Discharge instructions previously reviewed with pt and wife.  IV removed, tip intact. Gauze dressing to perinuem remains CDI.    Pt discharged via wheelchair with all personal belongings after all questions were answered.

## 2020-10-24 NOTE — OR NURSING
Discharge instructions reviewed with pt and wife. Abernathy care given and supplies for catheter care provided.    Pt dressed with assistance from wife, nausea with emesis x 1, medicated per MAR.

## 2020-10-30 DIAGNOSIS — E78.5 DYSLIPIDEMIA: ICD-10-CM

## 2020-11-02 RX ORDER — ROSUVASTATIN CALCIUM 20 MG/1
TABLET, COATED ORAL
Qty: 90 TAB | Refills: 1 | Status: SHIPPED | OUTPATIENT
Start: 2020-11-02 | End: 2021-04-27 | Stop reason: SDUPTHER

## 2020-12-10 ENCOUNTER — OFFICE VISIT (OUTPATIENT)
Dept: SLEEP MEDICINE | Facility: MEDICAL CENTER | Age: 60
End: 2020-12-10
Payer: COMMERCIAL

## 2020-12-10 VITALS
DIASTOLIC BLOOD PRESSURE: 66 MMHG | SYSTOLIC BLOOD PRESSURE: 118 MMHG | HEART RATE: 72 BPM | OXYGEN SATURATION: 92 % | HEIGHT: 70 IN | WEIGHT: 260 LBS | BODY MASS INDEX: 37.22 KG/M2 | RESPIRATION RATE: 16 BRPM

## 2020-12-10 DIAGNOSIS — G47.33 OBSTRUCTIVE SLEEP APNEA SYNDROME: Chronic | ICD-10-CM

## 2020-12-10 DIAGNOSIS — Z78.9 NONSMOKER: ICD-10-CM

## 2020-12-10 DIAGNOSIS — E66.9 OBESITY (BMI 30-39.9): ICD-10-CM

## 2020-12-10 PROCEDURE — 99213 OFFICE O/P EST LOW 20 MIN: CPT | Performed by: NURSE PRACTITIONER

## 2020-12-10 ASSESSMENT — FIBROSIS 4 INDEX: FIB4 SCORE: 1.47

## 2020-12-10 NOTE — PROGRESS NOTES
Chief Complaint   Patient presents with   • Apnea     Last Seen 9/30/2020        HPI:  Edd Beal is a 60 y.o. year old male here today for follow-up on JOAQUINA; first compliance check on new device.  He is accompanied by his wife Laura.  Established care with Dr. Trejo 4/28/20.    He obtained new device and using BIPAP 21/17cm with 100% compliance, avg nightly use of 6hr 15min, significant mask leak and reduced AHi 5.4/hr. He notes pressure to be too high with mask leaking. He notes RAMP to be comfortable but pressure with mask leak and still wake him up. Otherwise he has no complaints. No respiratory or cardiac issues. He notes generally sleeping well on therapy.  GERD controlled with daily PPI.    He is requesting [employer] form completed as well.    He completed urology surgery and recovering well.     SLEEP HX:  He has been on therapy for >15yrs.  Currently using APAP 15-20cm and device is 20yrs old.   PSG split night 5/28/20 indicates sleep efficiency was 61%, severe sleep apnea with overall AHI of 83.5/h and O2 tim of 86%.  He was titrated on CPAP and BiPAP with an incomplete titration with best response to bilevel 21/17 cm with reduced AHI of 8.57/h and O2 tim of 94%.  Recommendation was beginning bilevel 21/17 cm or 22/18 cm with a large Vitera mask with close follow-up.      ROS: As per HPI and otherwise negative if not stated.    Past Medical History:   Diagnosis Date   • BPH (benign prostatic hyperplasia)    • Bronchitis    • Bulbous urethral stricture 10/23/2020   • Chickenpox    • Facial basal cell cancer 2007    lip, no recurrence   • GERD (gastroesophageal reflux disease)    • GI bleed    • High cholesterol    • HLD (hyperlipidemia)    • Kidney stone    • Sleep apnea        Past Surgical History:   Procedure Laterality Date   • URETHROPLASTY, STAGE 2, USING BUCCAL MUCOSAL GRAFT  10/23/2020    Procedure: URETHROPLASTY,STAGE 2,USING BUCCAL MUCOSAL GRAFTONE-STAGE RECONSTRUCTION OF MALE  ANTERIOR URETHRA;  Surgeon: Tai Morales M.D.;  Location: SURGERY [facility];  Service: Urology   • CYSTOSCOPY  10/23/2020    Procedure: CYSTOSCOPY WITH LITHOLITHALOPAXY;  Surgeon: Tai Morales M.D.;  Location: SURGERY [facility];  Service: Urology   • EYE SURGERY  09/23/2019    Left eye laser surgery   • PB CYSTOSCOPY,INSERT URETERAL STENT  6/26/2019    Procedure: CYSTOSCOPY- LASER LITHOTRIPSY  FOR URETHERAL STONE.;  Surgeon: Dion Olivares M.D.;  Location: SURGERY [facility];  Service: Urology   • ARTHROPLASTY      right knee   • ARTHROSCOPY, KNEE     • CARPAL TUNNEL RELEASE     • EYE SURGERY      cataracts bilateral, corneal transplant   • EYE SURGERY      cataract AND corneal replacement BILATERALLY   • HERNIA REPAIR         Family History   Problem Relation Age of Onset   • Cancer Mother 60        colon cancer   • Heart Disease Mother         CHF       Social History     Socioeconomic History   • Marital status:      Spouse name: Not on file   • Number of children: Not on file   • Years of education: Not on file   • Highest education level: Not on file   Occupational History   • Not on file   Social Needs   • Financial resource strain: Not on file   • Food insecurity     Worry: Not on file     Inability: Not on file   • Transportation needs     Medical: Not on file     Non-medical: Not on file   Tobacco Use   • Smoking status: Never Smoker   • Smokeless tobacco: Never Used   Substance and Sexual Activity   • Alcohol use: Yes     Comment: Occasionally   • Drug use: No   • Sexual activity: Not on file   Lifestyle   • Physical activity     Days per week: Not on file     Minutes per session: Not on file   • Stress: Not on file   Relationships   • Social connections     Talks on phone: Not on file     Gets together: Not on file     Attends Mu-ism service: Not on file     Active member of club or organization: Not on file     Attends meetings of clubs or organizations: Not on file      "Relationship status: Not on file   • Intimate partner violence     Fear of current or ex partner: Not on file     Emotionally abused: Not on file     Physically abused: Not on file     Forced sexual activity: Not on file   Other Topics Concern   • Not on file   Social History Narrative   • Not on file       Allergies as of 12/10/2020   • (No Known Allergies)        Vitals:  /66 (BP Location: Left arm, Patient Position: Sitting, BP Cuff Size: Adult)   Pulse 72   Resp 16   Ht 1.778 m (5' 10\")   Wt 117.9 kg (260 lb)   SpO2 92%     Current medications as of today   Current Outpatient Medications   Medication Sig Dispense Refill   • rosuvastatin (CRESTOR) 20 MG Tab TAKE 1 TABLET BY MOUTH EVERY EVENING 90 Tab 1   • oxybutynin SR (DITROPAN-XL) 10 MG CR tablet Take 1 Tab by mouth 1 time daily as needed. 21 Tab 01   • mag hydrox-al hydrox-simeth-diphenhydrAMINE-lidocaine Take 30 mL by mouth every 6 hours as needed. 500 mL 3   • Ascorbic Acid (VITAMIN C) 1000 MG Tab Take 1,000 mg by mouth every bedtime.     • Omega-3 Fatty Acids (FISH OIL) 1000 MG Cap capsule Take 1,000 mg by mouth every bedtime.     • pantoprazole (PROTONIX) 40 MG Tablet Delayed Response Take 40 mg by mouth every bedtime.     • tamsulosin (FLOMAX) 0.4 MG capsule Take 0.4 mg by mouth every bedtime.       No current facility-administered medications for this visit.          Physical Exam:   Gen:           Alert and oriented, No apparent distress. Mood and affect appropriate, normal interaction with examiner.  Eyes:          PERRL, EOM intact, sclere white, conjunctive moist.  Ears:          Not examined.   Hearing:     Grossly intact.  Nose:          Normal, no lesions or deformities.  Dentition:    Good dentition.  Oropharynx:   mask  Mallampati Classification: mask  Neck:        Supple, trachea midline, no masses.  Respiratory Effort: No intercostal retractions or use of accessory muscles.   Lung Auscultation:      Clear to auscultation " bilaterally; no rales, rhonchi or wheezing.  CV:            Regular rate and rhythm. No murmurs, rubs or gallops.  Abd:           Not examined.   Lymphadenopathy: Not examined.  Gait and Station: Normal.  Digits and Nails: No clubbing, cyanosis, petechiae, or nodes.   Cranial Nerves: II-XII grossly intact.  Skin:        No rashes, lesions or ulcers noted.               Ext:           No cyanosis or edema.      Assessment:  1. Obstructive sleep apnea syndrome     2. Obesity (BMI 30-39.9)  Height And Weight   3. Nonsmoker         Immunizations:    Flu:recommend  Pneumovax 23:not due  Prevnar 13:not due    Plan:  1. JOAQUINA is well treated but patient having pressure intolerance issues with mask leak.  He feels mask fits well otherwise. Recommend pressured adjustment;  DME other; adjust bipap to 19/15cm in office today  2. [employer] form completed.  3. Encouraged weight loss through diet/exercise  4. F/u with PCP for other health concerns  5. F/u in 3 mos to check compliance, sooner if needed. If stable at next OV, may go to annual visits.    Please note that this dictation was created using voice recognition software. I have made every reasonable attempt to correct obvious errors, but it is possible there are errors of grammar and possibly content that I did not discover before finalizing the note.

## 2021-03-02 ENCOUNTER — HOSPITAL ENCOUNTER (OUTPATIENT)
Dept: RADIOLOGY | Facility: MEDICAL CENTER | Age: 61
End: 2021-03-02
Attending: NURSE PRACTITIONER
Payer: COMMERCIAL

## 2021-03-02 DIAGNOSIS — R10.10 UPPER ABDOMINAL PAIN: ICD-10-CM

## 2021-03-02 PROCEDURE — 76700 US EXAM ABDOM COMPLETE: CPT

## 2021-03-09 ENCOUNTER — OFFICE VISIT (OUTPATIENT)
Dept: SLEEP MEDICINE | Facility: MEDICAL CENTER | Age: 61
End: 2021-03-09
Payer: COMMERCIAL

## 2021-03-09 VITALS
HEIGHT: 70 IN | HEART RATE: 66 BPM | WEIGHT: 268 LBS | BODY MASS INDEX: 38.37 KG/M2 | OXYGEN SATURATION: 92 % | DIASTOLIC BLOOD PRESSURE: 60 MMHG | SYSTOLIC BLOOD PRESSURE: 130 MMHG | RESPIRATION RATE: 16 BRPM

## 2021-03-09 DIAGNOSIS — G47.33 OBSTRUCTIVE SLEEP APNEA SYNDROME: Chronic | ICD-10-CM

## 2021-03-09 DIAGNOSIS — Z78.9 NONSMOKER: ICD-10-CM

## 2021-03-09 PROBLEM — R07.1 CHEST PAIN ON BREATHING: Status: RESOLVED | Noted: 2019-08-22 | Resolved: 2021-03-09

## 2021-03-09 PROCEDURE — 99213 OFFICE O/P EST LOW 20 MIN: CPT | Performed by: NURSE PRACTITIONER

## 2021-03-09 ASSESSMENT — FIBROSIS 4 INDEX: FIB4 SCORE: 1.47

## 2021-03-09 NOTE — PROGRESS NOTES
Chief Complaint   Patient presents with   • Apnea     Last Seen 12/10/2020        HPI:  Edd Beal is a 60 y.o. year old male here today for follow-up on JOAQUINA; compliance check after pressure adjustment.  Last OV 12/10/20    Currently using BIPAP 19/15cm (adjusted at last OV). Current device obtained 2020.  Compliance report 2/7/21-3/8/21 notes 100% compliance, avg nightly use of 7hr 1min, minimal to mod mask leak with reduced AHI 2.7/hr. Reviewed with patient. He feels he is sleeping well and no complaints regarding mask or pressure. He denies AM headaches. Overall he is sleeping well. Daytime energy levels consistent. He denies cardiac or respiratory symptoms. His wife is currently in the hospital.     SLEEP HX:  He has been on therapy for >15yrs.  PSG split night 5/28/20 indicates sleep efficiency was 61%, severe sleep apnea with overall AHI of 83.5/h and O2 tim of 86%.  He was titrated on CPAP and BiPAP with an incomplete titration with best response to bilevel 21/17 cm with reduced AHI of 8.57/h and O2 tim of 94%.  Recommendation was beginning bilevel 21/17 cm or 22/18 cm with a large Vitera mask with close follow-up.       ROS: As per HPI and otherwise negative if not stated.    Past Medical History:   Diagnosis Date   • BPH (benign prostatic hyperplasia)    • Bronchitis    • Bulbous urethral stricture 10/23/2020   • Chickenpox    • Facial basal cell cancer 2007    lip, no recurrence   • GERD (gastroesophageal reflux disease)    • GI bleed    • High cholesterol    • HLD (hyperlipidemia)    • Kidney stone    • Sleep apnea        Past Surgical History:   Procedure Laterality Date   • URETHROPLASTY, STAGE 2, USING BUCCAL MUCOSAL GRAFT  10/23/2020    Procedure: URETHROPLASTY,STAGE 2,USING BUCCAL MUCOSAL GRAFTONE-STAGE RECONSTRUCTION OF MALE ANTERIOR URETHRA;  Surgeon: Tai Morales M.D.;  Location: SURGERY [facility];  Service: Urology   • CYSTOSCOPY  10/23/2020    Procedure: CYSTOSCOPY WITH  LITHOLITHALOPAXY;  Surgeon: Tai Morales M.D.;  Location: SURGERY [facility];  Service: Urology   • EYE SURGERY  09/23/2019    Left eye laser surgery   • PB CYSTOSCOPY,INSERT URETERAL STENT  6/26/2019    Procedure: CYSTOSCOPY- LASER LITHOTRIPSY  FOR URETHERAL STONE.;  Surgeon: Dion Olivares M.D.;  Location: SURGERY [facility];  Service: Urology   • ARTHROPLASTY      right knee   • ARTHROSCOPY, KNEE     • CARPAL TUNNEL RELEASE     • EYE SURGERY      cataracts bilateral, corneal transplant   • EYE SURGERY      cataract AND corneal replacement BILATERALLY   • HERNIA REPAIR         Family History   Problem Relation Age of Onset   • Cancer Mother 60        colon cancer   • Heart Disease Mother         CHF       Social History     Socioeconomic History   • Marital status:      Spouse name: Not on file   • Number of children: Not on file   • Years of education: Not on file   • Highest education level: Not on file   Occupational History   • Not on file   Tobacco Use   • Smoking status: Never Smoker   • Smokeless tobacco: Never Used   Substance and Sexual Activity   • Alcohol use: Yes     Comment: Occasionally   • Drug use: No   • Sexual activity: Not on file   Other Topics Concern   • Not on file   Social History Narrative   • Not on file     Social Determinants of Health     Financial Resource Strain:    • Difficulty of Paying Living Expenses:    Food Insecurity:    • Worried About Running Out of Food in the Last Year:    • Ran Out of Food in the Last Year:    Transportation Needs:    • Lack of Transportation (Medical):    • Lack of Transportation (Non-Medical):    Physical Activity:    • Days of Exercise per Week:    • Minutes of Exercise per Session:    Stress:    • Feeling of Stress :    Social Connections:    • Frequency of Communication with Friends and Family:    • Frequency of Social Gatherings with Friends and Family:    • Attends Faith Services:    • Active Member of Clubs or  "Organizations:    • Attends Club or Organization Meetings:    • Marital Status:    Intimate Partner Violence:    • Fear of Current or Ex-Partner:    • Emotionally Abused:    • Physically Abused:    • Sexually Abused:        Allergies as of 03/09/2021   • (No Known Allergies)        Vitals:  /60 (BP Location: Left arm, Patient Position: Sitting, BP Cuff Size: Adult)   Pulse 66   Resp 16   Ht 1.778 m (5' 10\")   Wt 122 kg (268 lb)   SpO2 92%     Current medications as of today   Current Outpatient Medications   Medication Sig Dispense Refill   • rosuvastatin (CRESTOR) 20 MG Tab TAKE 1 TABLET BY MOUTH EVERY EVENING 90 Tab 1   • oxybutynin SR (DITROPAN-XL) 10 MG CR tablet Take 1 Tab by mouth 1 time daily as needed. 21 Tab 01   • mag hydrox-al hydrox-simeth-diphenhydrAMINE-lidocaine Take 30 mL by mouth every 6 hours as needed. 500 mL 3   • Ascorbic Acid (VITAMIN C) 1000 MG Tab Take 1,000 mg by mouth every bedtime.     • Omega-3 Fatty Acids (FISH OIL) 1000 MG Cap capsule Take 1,000 mg by mouth every bedtime.     • pantoprazole (PROTONIX) 40 MG Tablet Delayed Response Take 40 mg by mouth every bedtime.     • tamsulosin (FLOMAX) 0.4 MG capsule Take 0.4 mg by mouth every bedtime.       No current facility-administered medications for this visit.         Physical Exam:   Gen:           Alert and oriented, No apparent distress. Mood and affect appropriate, normal interaction with examiner.  Eyes:          PERRL, EOM intact, sclere white, conjunctive moist.  Ears:          Not examined.   Hearing:     Grossly intact.  Nose:          Normal, no lesions or deformities.  Dentition:    mask  Oropharynx:   mask  Mallampati Classification: mask  Neck:        Supple, trachea midline, no masses.  Respiratory Effort: No intercostal retractions or use of accessory muscles.   Lung Auscultation:      Clear to auscultation bilaterally; no rales, rhonchi or wheezing.  CV:            Regular rate and rhythm. No murmurs, rubs or " gallops.  Abd:           Not examined.   Lymphadenopathy: Not examined.  Gait and Station: Normal.  Digits and Nails: No clubbing, cyanosis, petechiae, or nodes.   Cranial Nerves: II-XII grossly intact.  Skin:        No rashes, lesions or ulcers noted.               Ext:           No cyanosis or edema.      Assessment:  1. Obstructive sleep apnea syndrome     2. BMI 38.0-38.9,adult  Height And Weight   3. Nonsmoker         Immunizations:    Flu:recommend  Pneumovax 23:not due  Prevnar 13:not due    Plan:  1. JOAQUINA is clinically stable; continue BIPAP nightly.  DME mask/supplies.  2. Discussed sleep hygiene.  3. Encouraged weight loss through diet/exercise.  4. F/u with PCP for other health concerns  5. F/u in 1 year with compliance report, sooner if needed.    Please note that this dictation was created using voice recognition software. I have made every reasonable attempt to correct obvious errors, but it is possible there are errors of grammar and possibly content that I did not discover before finalizing the note.

## 2021-03-15 DIAGNOSIS — Z23 NEED FOR VACCINATION: ICD-10-CM

## 2021-04-27 DIAGNOSIS — E78.5 DYSLIPIDEMIA: ICD-10-CM

## 2021-04-27 RX ORDER — ROSUVASTATIN CALCIUM 20 MG/1
TABLET, COATED ORAL
Qty: 90 TABLET | Refills: 0 | Status: SHIPPED | OUTPATIENT
Start: 2021-04-27 | End: 2021-09-23 | Stop reason: SDUPTHER

## 2021-08-17 DIAGNOSIS — E78.5 DYSLIPIDEMIA: ICD-10-CM

## 2021-08-17 RX ORDER — ROSUVASTATIN CALCIUM 20 MG/1
TABLET, COATED ORAL
Qty: 90 TABLET | Refills: 0 | OUTPATIENT
Start: 2021-08-17

## 2021-09-23 DIAGNOSIS — E78.5 DYSLIPIDEMIA: ICD-10-CM

## 2021-09-24 ENCOUNTER — TELEPHONE (OUTPATIENT)
Dept: CARDIOLOGY | Facility: MEDICAL CENTER | Age: 61
End: 2021-09-24

## 2021-09-24 NOTE — TELEPHONE ENCOUNTER
----- Message from Kate Bond, Med Ass't sent at 9/23/2021  3:39 PM PDT -----  Regarding: patient overdue for follow up  Please call patient to schedule follow up appointment. Last seen Dr. Oakes 8/22/2019. No recent lipid lab work. Requesting RX for Crestor.     Thank you so much  Kate

## 2021-09-27 RX ORDER — ROSUVASTATIN CALCIUM 20 MG/1
TABLET, COATED ORAL
Qty: 30 TABLET | Refills: 0 | Status: SHIPPED | OUTPATIENT
Start: 2021-09-27 | End: 2021-09-30 | Stop reason: SDUPTHER

## 2021-09-30 ENCOUNTER — TELEPHONE (OUTPATIENT)
Dept: SLEEP MEDICINE | Facility: MEDICAL CENTER | Age: 61
End: 2021-09-30

## 2021-09-30 ENCOUNTER — TELEPHONE (OUTPATIENT)
Dept: CARDIOLOGY | Facility: MEDICAL CENTER | Age: 61
End: 2021-09-30

## 2021-09-30 DIAGNOSIS — E78.5 DYSLIPIDEMIA: ICD-10-CM

## 2021-09-30 RX ORDER — ROSUVASTATIN CALCIUM 20 MG/1
20 TABLET, COATED ORAL DAILY
Qty: 30 TABLET | Refills: 0 | Status: SHIPPED | OUTPATIENT
Start: 2021-09-30 | End: 2021-10-13 | Stop reason: SDUPTHER

## 2021-09-30 NOTE — TELEPHONE ENCOUNTER
MARISSA Goodman from [facility] pharmacy called and states that [facility] sent the Rx rosuvastatin (CRESTOR) 20 MG Tab over without instructions and is asking for clarification. Please call back at [phone].    Thank you.

## 2021-09-30 NOTE — TELEPHONE ENCOUNTER
Patient came into office with concerns about his BiPap. His wife states that his snoring has increased. There are no appts available with Danuta Brennan until December. Patient was wondering if he could bring his chip in for you to download and look at.

## 2021-09-30 NOTE — TELEPHONE ENCOUNTER
Compliance report 8/31/2021 through 9/29/2021 notes 100% compliance, avg nightly use of 8hrs, using BIPAP 19/15cm, with moderate mask leak and overall AHI 3.7/hr. overall apneas are well controlled but his mask has started to leak more over the last 3 weeks.  He may need to replace his cushion on his mask.  He did have one night 30 days ago where he had over 10 apneas per hour but since then his average has been less than 5/h.  I would continue with his current pressures but work on his mask fitting better.

## 2021-10-13 DIAGNOSIS — E78.5 DYSLIPIDEMIA: ICD-10-CM

## 2021-10-13 RX ORDER — ROSUVASTATIN CALCIUM 20 MG/1
20 TABLET, COATED ORAL DAILY
Qty: 90 TABLET | Refills: 0 | Status: SHIPPED | OUTPATIENT
Start: 2021-10-13 | End: 2021-10-14 | Stop reason: SDUPTHER

## 2021-10-14 ENCOUNTER — OFFICE VISIT (OUTPATIENT)
Dept: CARDIOLOGY | Facility: MEDICAL CENTER | Age: 61
End: 2021-10-14
Payer: COMMERCIAL

## 2021-10-14 VITALS
DIASTOLIC BLOOD PRESSURE: 80 MMHG | BODY MASS INDEX: 38.8 KG/M2 | WEIGHT: 271 LBS | HEIGHT: 70 IN | HEART RATE: 72 BPM | RESPIRATION RATE: 20 BRPM | SYSTOLIC BLOOD PRESSURE: 126 MMHG | OXYGEN SATURATION: 95 %

## 2021-10-14 DIAGNOSIS — E78.5 DYSLIPIDEMIA: ICD-10-CM

## 2021-10-14 DIAGNOSIS — R93.1 HIGH CORONARY ARTERY CALCIUM SCORE: ICD-10-CM

## 2021-10-14 DIAGNOSIS — E78.1 ABNORMALLY LOW HIGH DENSITY LIPOPROTEIN (HDL) CHOLESTEROL WITH HYPERTRIGLYCERIDEMIA: ICD-10-CM

## 2021-10-14 DIAGNOSIS — E78.6 ABNORMALLY LOW HIGH DENSITY LIPOPROTEIN (HDL) CHOLESTEROL WITH HYPERTRIGLYCERIDEMIA: ICD-10-CM

## 2021-10-14 PROCEDURE — 99214 OFFICE O/P EST MOD 30 MIN: CPT | Performed by: NURSE PRACTITIONER

## 2021-10-14 RX ORDER — ASPIRIN 81 MG/1
81 TABLET ORAL
COMMUNITY

## 2021-10-14 RX ORDER — TADALAFIL 20 MG/1
20 TABLET ORAL
COMMUNITY
Start: 2021-10-03 | End: 2022-05-26

## 2021-10-14 RX ORDER — ROSUVASTATIN CALCIUM 20 MG/1
20 TABLET, COATED ORAL DAILY
Qty: 90 TABLET | Refills: 3 | Status: SHIPPED | OUTPATIENT
Start: 2021-10-14 | End: 2022-11-10 | Stop reason: SDUPTHER

## 2021-10-14 ASSESSMENT — ENCOUNTER SYMPTOMS
NAUSEA: 0
PALPITATIONS: 0
WEAKNESS: 0
WHEEZING: 0
COUGH: 0
SPUTUM PRODUCTION: 0
ORTHOPNEA: 0
HEMOPTYSIS: 0
DIZZINESS: 0
VOMITING: 0
PND: 0
SHORTNESS OF BREATH: 0
CLAUDICATION: 0

## 2021-10-14 NOTE — LETTER
[facility]   [address], [address]  [place] [address]  Phone: [phone]  Fax: [phone]              Edd Beal  1960    Encounter Date: 10/14/2021    ALVARO Mariee          PROGRESS NOTE:  Chief Complaint   Patient presents with   • Dyslipidemia     F/V Dx: Abnormally low high density lipoprotein (HDL) cholesterol with hypertriglyceridemia       Subjective     Edd Beal is a 60 y.o. male who presents today for elevated coronary calcification score.     He used to be follow up with Dr. Oakes in our clinic, history of hyperlipidemia, JOAQUINA, and GERD.     Patient was having chest wall pain in 8/2019, elevated coronary calcification score. Treadmill stress test was negative.     Patient is doing well. No cardiovascular complaints.     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     Past Medical History:   Diagnosis Date   • BPH (benign prostatic hyperplasia)    • Bronchitis    • Bulbous urethral stricture 10/23/2020   • Chickenpox    • Facial basal cell cancer 2007    lip, no recurrence   • GERD (gastroesophageal reflux disease)    • GI bleed    • High cholesterol    • HLD (hyperlipidemia)    • Kidney stone    • Sleep apnea      Past Surgical History:   Procedure Laterality Date   • URETHROPLASTY, STAGE 2, USING BUCCAL MUCOSAL GRAFT  10/23/2020    Procedure: URETHROPLASTY,STAGE 2,USING BUCCAL MUCOSAL GRAFTONE-STAGE RECONSTRUCTION OF MALE ANTERIOR URETHRA;  Surgeon: Tai Morales M.D.;  Location: [facility];  Service: Urology   • CYSTOSCOPY  10/23/2020    Procedure: CYSTOSCOPY WITH LITHOLITHALOPAXY;  Surgeon: Tai Morales M.D.;  Location: [facility];  Service: Urology   • EYE SURGERY  09/23/2019    Left eye laser surgery   • PB CYSTOSCOPY,INSERT URETERAL STENT  6/26/2019    Procedure: CYSTOSCOPY- LASER LITHOTRIPSY  FOR URETHERAL STONE.;  Surgeon: Dion Olivares M.D.;  Location: [facility];  Service:  Urology   • ARTHROPLASTY      right knee   • ARTHROSCOPY, KNEE     • CARPAL TUNNEL RELEASE     • EYE SURGERY      cataracts bilateral, corneal transplant   • EYE SURGERY      cataract AND corneal replacement BILATERALLY   • HERNIA REPAIR       Family History   Problem Relation Age of Onset   • Cancer Mother 60        colon cancer   • Heart Disease Mother         CHF     Social History     Socioeconomic History   • Marital status:      Spouse name: Not on file   • Number of children: Not on file   • Years of education: Not on file   • Highest education level: Not on file   Occupational History   • Not on file   Tobacco Use   • Smoking status: Never Smoker   • Smokeless tobacco: Never Used   Vaping Use   • Vaping Use: Never used   Substance and Sexual Activity   • Alcohol use: Yes     Comment: Occasionally   • Drug use: No   • Sexual activity: Not on file   Other Topics Concern   • Not on file   Social History Narrative   • Not on file     Social Determinants of Health     Financial Resource Strain:    • Difficulty of Paying Living Expenses:    Food Insecurity:    • Worried About Running Out of Food in the Last Year:    • Ran Out of Food in the Last Year:    Transportation Needs:    • Lack of Transportation (Medical):    • Lack of Transportation (Non-Medical):    Physical Activity:    • Days of Exercise per Week:    • Minutes of Exercise per Session:    Stress:    • Feeling of Stress :    Social Connections:    • Frequency of Communication with Friends and Family:    • Frequency of Social Gatherings with Friends and Family:    • Attends [employer] Services:    • Active Member of Clubs or Organizations:    • Attends Club or Organization Meetings:    • Marital Status:    Intimate Partner Violence:    • Fear of Current or Ex-Partner:    • Emotionally Abused:    • Physically Abused:    • Sexually Abused:      No Known Allergies  Outpatient Encounter Medications as of 10/14/2021   Medication Sig Dispense Refill   •  "aspirin 81 MG EC tablet 81 mg.     • tadalafil (CIALIS) 20 MG tablet Take 20 mg by mouth every day.     • rosuvastatin (CRESTOR) 20 MG Tab Take 1 Tablet by mouth every day. 90 Tablet 3   • Ascorbic Acid (VITAMIN C) 1000 MG Tab Take 1,000 mg by mouth every bedtime.     • Omega-3 Fatty Acids (FISH OIL) 1000 MG Cap capsule Take 1,000 mg by mouth every bedtime.     • pantoprazole (PROTONIX) 40 MG Tablet Delayed Response Take 40 mg by mouth every bedtime.     • tamsulosin (FLOMAX) 0.4 MG capsule Take 0.4 mg by mouth every bedtime.     • [DISCONTINUED] rosuvastatin (CRESTOR) 20 MG Tab Take 1 Tablet by mouth every day. Please keep your scheduled follow up appointment for further refills. Thank you! 90 Tablet 0   • [DISCONTINUED] oxybutynin SR (DITROPAN-XL) 10 MG CR tablet Take 1 Tab by mouth 1 time daily as needed. 21 Tab 01   • [DISCONTINUED] mag hydrox-al hydrox-simeth-diphenhydrAMINE-lidocaine Take 30 mL by mouth every 6 hours as needed. 500 mL 3     No facility-administered encounter medications on file as of 10/14/2021.     Review of Systems   Constitutional: Negative for malaise/fatigue.   Respiratory: Negative for cough, hemoptysis, sputum production, shortness of breath and wheezing.    Cardiovascular: Negative for chest pain, palpitations, orthopnea, claudication, leg swelling and PND.   Gastrointestinal: Negative for nausea and vomiting.   Neurological: Negative for dizziness and weakness.              Objective     /80 (BP Location: Left arm, Patient Position: Sitting, BP Cuff Size: Adult)   Pulse 72   Resp 20   Ht 1.778 m (5' 10\")   Wt 123 kg (271 lb)   SpO2 95%   BMI 38.88 kg/m²     Physical Exam  Constitutional:       General: He is not in acute distress.     Appearance: He is well-developed. He is not diaphoretic.   HENT:      Head: Normocephalic and atraumatic.   Eyes:      Pupils: Pupils are equal, round, and reactive to light.   Neck:      Vascular: No JVD.   Cardiovascular:      Rate and " Rhythm: Normal rate and regular rhythm.      Heart sounds: Normal heart sounds.   Pulmonary:      Effort: Pulmonary effort is normal.      Breath sounds: Normal breath sounds.   Abdominal:      General: Bowel sounds are normal. There is no distension.      Palpations: Abdomen is soft.   Skin:     General: Skin is warm and dry.   Neurological:      Mental Status: He is alert and oriented to person, place, and time.   Psychiatric:         Behavior: Behavior normal.         Thought Content: Thought content normal.         Judgment: Judgment normal.            Coronary calcification:  9/2019  LMA - 0.0  LCX - 548.4  LAD - 602.7  RCA - 42.2  PDA - 0.0     Total Calcium Score: 1193.3     Percentile: Calcium score is above the 90th percentile for the patient's age and sex.     Other findings:  Heart: Normal size.  Lungs: Clear.  Mediastinum: Normal.  Upper abdomen: Normal.      Treadmill stress test   9/19/2019  Patient exercised for 7 minutes, 31 seconds, and 10.1 METs.   100% of MPHR: 162   90% of MPHR: 146   85% of MPHR: 138   Peak Heart Rate Achieved: 149   92 % of MPHR.   Maximum /74 mmHg   Functional aerobic impairment: (15) Active     Test was terminated due to: Fatigue     Observations/Comments: Patient tolerated test well. He denied chest pain or discomfort. He denied shortness of breath. Patient recovered to baseline during rest period. Vital signs stable, oxygen saturation 98% noted.     Test performed by:   Nikki Rojas   9/10/2019 10:32 AM      Assessment & Plan     1. Abnormally low high density lipoprotein (HDL) cholesterol with hypertriglyceridemia  Comp Metabolic Panel    Lipid Profile    HEMOGLOBIN A1C (Glycohemoglobin GHB Total/A1C with MBG Estimate)   2. High coronary artery calcium score     3. Dyslipidemia  rosuvastatin (CRESTOR) 20 MG Tab       Medical Decision Making: Today's Assessment/Status/Plan:        1. elevated coronary calcium score:  - treadmill stress test showed no ischemia  -  continue asa and rosuvastatin 20mg qd     2. Hyperlipidemia and hypertriglyceridemia:  - LDL 90  - hypertriglyceridemia  200  - repeat lipid profile in 3 months   - continue rosuvastatin 20mg qd     The 10-year ASCVD risk score (Zuleykakeith SAENZ Jr., et al., 2013) is: 10.5%    Values used to calculate the score:      Age: 60 years      Sex: Male      Is Non- : No      Diabetic: No      Tobacco smoker: No      Systolic Blood Pressure: 126 mmHg      Is BP treated: No      HDL Cholesterol: 27 mg/dL      Total Cholesterol: 157 mg/dL     Follow up in 1 year, sooner as needed. CMP, lipid profile and glycohemoglobin.                     Linn Thornton M.D.  [address] [address]  Via In Basket

## 2021-10-14 NOTE — PROGRESS NOTES
Chief Complaint   Patient presents with   • Dyslipidemia     F/V Dx: Abnormally low high density lipoprotein (HDL) cholesterol with hypertriglyceridemia       Subjective     Edd Beal is a 60 y.o. male who presents today for elevated coronary calcification score.     He used to be follow up with Dr. Oakes in our clinic, history of hyperlipidemia, JOAQUINA, and GERD.     Patient was having chest wall pain in 8/2019, elevated coronary calcification score. Treadmill stress test was negative.     Patient is doing well. No cardiovascular complaints.     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     Past Medical History:   Diagnosis Date   • BPH (benign prostatic hyperplasia)    • Bronchitis    • Bulbous urethral stricture 10/23/2020   • Chickenpox    • Facial basal cell cancer 2007    lip, no recurrence   • GERD (gastroesophageal reflux disease)    • GI bleed    • High cholesterol    • HLD (hyperlipidemia)    • Kidney stone    • Sleep apnea      Past Surgical History:   Procedure Laterality Date   • URETHROPLASTY, STAGE 2, USING BUCCAL MUCOSAL GRAFT  10/23/2020    Procedure: URETHROPLASTY,STAGE 2,USING BUCCAL MUCOSAL GRAFTONE-STAGE RECONSTRUCTION OF MALE ANTERIOR URETHRA;  Surgeon: Tai Morales M.D.;  Location: [facility];  Service: Urology   • CYSTOSCOPY  10/23/2020    Procedure: CYSTOSCOPY WITH LITHOLITHALOPAXY;  Surgeon: Tai Morales M.D.;  Location: [facility];  Service: Urology   • EYE SURGERY  09/23/2019    Left eye laser surgery   • PB CYSTOSCOPY,INSERT URETERAL STENT  6/26/2019    Procedure: CYSTOSCOPY- LASER LITHOTRIPSY  FOR URETHERAL STONE.;  Surgeon: Dion Olivares M.D.;  Location: [facility];  Service: Urology   • ARTHROPLASTY      right knee   • ARTHROSCOPY, KNEE     • CARPAL TUNNEL RELEASE     • EYE SURGERY      cataracts bilateral, corneal transplant   • EYE SURGERY      cataract AND corneal replacement BILATERALLY   • HERNIA REPAIR       Family History   Problem  Relation Age of Onset   • Cancer Mother 60        colon cancer   • Heart Disease Mother         CHF     Social History     Socioeconomic History   • Marital status:      Spouse name: Not on file   • Number of children: Not on file   • Years of education: Not on file   • Highest education level: Not on file   Occupational History   • Not on file   Tobacco Use   • Smoking status: Never Smoker   • Smokeless tobacco: Never Used   Vaping Use   • Vaping Use: Never used   Substance and Sexual Activity   • Alcohol use: Yes     Comment: Occasionally   • Drug use: No   • Sexual activity: Not on file   Other Topics Concern   • Not on file   Social History Narrative   • Not on file     Social Determinants of Health     Financial Resource Strain:    • Difficulty of Paying Living Expenses:    Food Insecurity:    • Worried About Running Out of Food in the Last Year:    • Ran Out of Food in the Last Year:    Transportation Needs:    • Lack of Transportation (Medical):    • Lack of Transportation (Non-Medical):    Physical Activity:    • Days of Exercise per Week:    • Minutes of Exercise per Session:    Stress:    • Feeling of Stress :    Social Connections:    • Frequency of Communication with Friends and Family:    • Frequency of Social Gatherings with Friends and Family:    • Attends [employer]regational Services:    • Active Member of Clubs or Organizations:    • Attends Club or Organization Meetings:    • Marital Status:    Intimate Partner Violence:    • Fear of Current or Ex-Partner:    • Emotionally Abused:    • Physically Abused:    • Sexually Abused:      No Known Allergies  Outpatient Encounter Medications as of 10/14/2021   Medication Sig Dispense Refill   • aspirin 81 MG EC tablet 81 mg.     • tadalafil (CIALIS) 20 MG tablet Take 20 mg by mouth every day.     • rosuvastatin (CRESTOR) 20 MG Tab Take 1 Tablet by mouth every day. 90 Tablet 3   • Ascorbic Acid (VITAMIN C) 1000 MG Tab Take 1,000 mg by mouth every bedtime.     •  "Omega-3 Fatty Acids (FISH OIL) 1000 MG Cap capsule Take 1,000 mg by mouth every bedtime.     • pantoprazole (PROTONIX) 40 MG Tablet Delayed Response Take 40 mg by mouth every bedtime.     • tamsulosin (FLOMAX) 0.4 MG capsule Take 0.4 mg by mouth every bedtime.     • [DISCONTINUED] rosuvastatin (CRESTOR) 20 MG Tab Take 1 Tablet by mouth every day. Please keep your scheduled follow up appointment for further refills. Thank you! 90 Tablet 0   • [DISCONTINUED] oxybutynin SR (DITROPAN-XL) 10 MG CR tablet Take 1 Tab by mouth 1 time daily as needed. 21 Tab 01   • [DISCONTINUED] mag hydrox-al hydrox-simeth-diphenhydrAMINE-lidocaine Take 30 mL by mouth every 6 hours as needed. 500 mL 3     No facility-administered encounter medications on file as of 10/14/2021.     Review of Systems   Constitutional: Negative for malaise/fatigue.   Respiratory: Negative for cough, hemoptysis, sputum production, shortness of breath and wheezing.    Cardiovascular: Negative for chest pain, palpitations, orthopnea, claudication, leg swelling and PND.   Gastrointestinal: Negative for nausea and vomiting.   Neurological: Negative for dizziness and weakness.              Objective     /80 (BP Location: Left arm, Patient Position: Sitting, BP Cuff Size: Adult)   Pulse 72   Resp 20   Ht 1.778 m (5' 10\")   Wt 123 kg (271 lb)   SpO2 95%   BMI 38.88 kg/m²     Physical Exam  Constitutional:       General: He is not in acute distress.     Appearance: He is well-developed. He is not diaphoretic.   HENT:      Head: Normocephalic and atraumatic.   Eyes:      Pupils: Pupils are equal, round, and reactive to light.   Neck:      Vascular: No JVD.   Cardiovascular:      Rate and Rhythm: Normal rate and regular rhythm.      Heart sounds: Normal heart sounds.   Pulmonary:      Effort: Pulmonary effort is normal.      Breath sounds: Normal breath sounds.   Abdominal:      General: Bowel sounds are normal. There is no distension.      Palpations: " Abdomen is soft.   Skin:     General: Skin is warm and dry.   Neurological:      Mental Status: He is alert and oriented to person, place, and time.   Psychiatric:         Behavior: Behavior normal.         Thought Content: Thought content normal.         Judgment: Judgment normal.            Coronary calcification:  9/2019  LMA - 0.0  LCX - 548.4  LAD - 602.7  RCA - 42.2  PDA - 0.0     Total Calcium Score: 1193.3     Percentile: Calcium score is above the 90th percentile for the patient's age and sex.     Other findings:  Heart: Normal size.  Lungs: Clear.  Mediastinum: Normal.  Upper abdomen: Normal.      Treadmill stress test   9/19/2019  Patient exercised for 7 minutes, 31 seconds, and 10.1 METs.   100% of MPHR: 162   90% of MPHR: 146   85% of MPHR: 138   Peak Heart Rate Achieved: 149   92 % of MPHR.   Maximum /74 mmHg   Functional aerobic impairment: (15) Active     Test was terminated due to: Fatigue     Observations/Comments: Patient tolerated test well. He denied chest pain or discomfort. He denied shortness of breath. Patient recovered to baseline during rest period. Vital signs stable, oxygen saturation 98% noted.     Test performed by:   Nikki Rojas   9/10/2019 10:32 AM      Assessment & Plan     1. Abnormally low high density lipoprotein (HDL) cholesterol with hypertriglyceridemia  Comp Metabolic Panel    Lipid Profile    HEMOGLOBIN A1C (Glycohemoglobin GHB Total/A1C with MBG Estimate)   2. High coronary artery calcium score     3. Dyslipidemia  rosuvastatin (CRESTOR) 20 MG Tab       Medical Decision Making: Today's Assessment/Status/Plan:        1. elevated coronary calcium score:  - treadmill stress test showed no ischemia  - continue asa and rosuvastatin 20mg qd     2. Hyperlipidemia and hypertriglyceridemia:  - LDL 90  - hypertriglyceridemia  200  - repeat lipid profile in 3 months   - continue rosuvastatin 20mg qd     The 10-year ASCVD risk score (Zuleykakeith SAENZ Jr., et al., 2013) is: 10.5%     Values used to calculate the score:      Age: 60 years      Sex: Male      Is Non- : No      Diabetic: No      Tobacco smoker: No      Systolic Blood Pressure: 126 mmHg      Is BP treated: No      HDL Cholesterol: 27 mg/dL      Total Cholesterol: 157 mg/dL     Follow up in 1 year, sooner as needed. CMP, lipid profile and glycohemoglobin.

## 2021-10-18 ENCOUNTER — HOSPITAL ENCOUNTER (OUTPATIENT)
Dept: LAB | Facility: MEDICAL CENTER | Age: 61
End: 2021-10-18
Attending: NURSE PRACTITIONER
Payer: COMMERCIAL

## 2021-10-18 DIAGNOSIS — E78.6 ABNORMALLY LOW HIGH DENSITY LIPOPROTEIN (HDL) CHOLESTEROL WITH HYPERTRIGLYCERIDEMIA: ICD-10-CM

## 2021-10-18 DIAGNOSIS — E78.1 ABNORMALLY LOW HIGH DENSITY LIPOPROTEIN (HDL) CHOLESTEROL WITH HYPERTRIGLYCERIDEMIA: ICD-10-CM

## 2021-10-18 LAB
ALBUMIN SERPL BCP-MCNC: 4.5 G/DL (ref 3.2–4.9)
ALBUMIN/GLOB SERPL: 1.7 G/DL
ALP SERPL-CCNC: 94 U/L (ref 30–99)
ALT SERPL-CCNC: 41 U/L (ref 2–50)
ANION GAP SERPL CALC-SCNC: 11 MMOL/L (ref 7–16)
AST SERPL-CCNC: 30 U/L (ref 12–45)
BILIRUB SERPL-MCNC: 1 MG/DL (ref 0.1–1.5)
BUN SERPL-MCNC: 13 MG/DL (ref 8–22)
CALCIUM SERPL-MCNC: 9.2 MG/DL (ref 8.5–10.5)
CHLORIDE SERPL-SCNC: 108 MMOL/L (ref 96–112)
CHOLEST SERPL-MCNC: 108 MG/DL (ref 100–199)
CO2 SERPL-SCNC: 23 MMOL/L (ref 20–33)
CREAT SERPL-MCNC: 0.96 MG/DL (ref 0.5–1.4)
EST. AVERAGE GLUCOSE BLD GHB EST-MCNC: 108 MG/DL
FASTING STATUS PATIENT QL REPORTED: NORMAL
GLOBULIN SER CALC-MCNC: 2.6 G/DL (ref 1.9–3.5)
GLUCOSE SERPL-MCNC: 121 MG/DL (ref 65–99)
HBA1C MFR BLD: 5.4 % (ref 4–5.6)
HDLC SERPL-MCNC: 27 MG/DL
LDLC SERPL CALC-MCNC: 46 MG/DL
POTASSIUM SERPL-SCNC: 4.2 MMOL/L (ref 3.6–5.5)
PROT SERPL-MCNC: 7.1 G/DL (ref 6–8.2)
SODIUM SERPL-SCNC: 142 MMOL/L (ref 135–145)
TRIGL SERPL-MCNC: 175 MG/DL (ref 0–149)

## 2021-10-18 PROCEDURE — 83036 HEMOGLOBIN GLYCOSYLATED A1C: CPT

## 2021-10-18 PROCEDURE — 80053 COMPREHEN METABOLIC PANEL: CPT

## 2021-10-18 PROCEDURE — 36415 COLL VENOUS BLD VENIPUNCTURE: CPT

## 2021-10-18 PROCEDURE — 80061 LIPID PANEL: CPT

## 2021-11-11 PROCEDURE — RXMED WILLOW AMBULATORY MEDICATION CHARGE: Performed by: INTERNAL MEDICINE

## 2021-11-13 ENCOUNTER — PHARMACY VISIT (OUTPATIENT)
Dept: PHARMACY | Facility: MEDICAL CENTER | Age: 61
End: 2021-11-13
Payer: COMMERCIAL

## 2022-01-03 ENCOUNTER — APPOINTMENT (OUTPATIENT)
Dept: SLEEP MEDICINE | Facility: MEDICAL CENTER | Age: 62
End: 2022-01-03
Payer: COMMERCIAL

## 2022-05-25 SDOH — ECONOMIC STABILITY: FOOD INSECURITY: WITHIN THE PAST 12 MONTHS, THE FOOD YOU BOUGHT JUST DIDN'T LAST AND YOU DIDN'T HAVE MONEY TO GET MORE.: NEVER TRUE

## 2022-05-25 SDOH — ECONOMIC STABILITY: HOUSING INSECURITY
IN THE LAST 12 MONTHS, WAS THERE A TIME WHEN YOU DID NOT HAVE A STEADY PLACE TO SLEEP OR SLEPT IN A SHELTER (INCLUDING NOW)?: NO

## 2022-05-25 SDOH — HEALTH STABILITY: PHYSICAL HEALTH: ON AVERAGE, HOW MANY DAYS PER WEEK DO YOU ENGAGE IN MODERATE TO STRENUOUS EXERCISE (LIKE A BRISK WALK)?: 4 DAYS

## 2022-05-25 SDOH — ECONOMIC STABILITY: INCOME INSECURITY: IN THE LAST 12 MONTHS, WAS THERE A TIME WHEN YOU WERE NOT ABLE TO PAY THE MORTGAGE OR RENT ON TIME?: NO

## 2022-05-25 SDOH — ECONOMIC STABILITY: TRANSPORTATION INSECURITY
IN THE PAST 12 MONTHS, HAS LACK OF TRANSPORTATION KEPT YOU FROM MEETINGS, WORK, OR FROM GETTING THINGS NEEDED FOR DAILY LIVING?: NO

## 2022-05-25 SDOH — ECONOMIC STABILITY: TRANSPORTATION INSECURITY
IN THE PAST 12 MONTHS, HAS THE LACK OF TRANSPORTATION KEPT YOU FROM MEDICAL APPOINTMENTS OR FROM GETTING MEDICATIONS?: NO

## 2022-05-25 SDOH — ECONOMIC STABILITY: FOOD INSECURITY: WITHIN THE PAST 12 MONTHS, YOU WORRIED THAT YOUR FOOD WOULD RUN OUT BEFORE YOU GOT MONEY TO BUY MORE.: NEVER TRUE

## 2022-05-25 SDOH — HEALTH STABILITY: PHYSICAL HEALTH: ON AVERAGE, HOW MANY MINUTES DO YOU ENGAGE IN EXERCISE AT THIS LEVEL?: 60 MIN

## 2022-05-25 SDOH — ECONOMIC STABILITY: INCOME INSECURITY: HOW HARD IS IT FOR YOU TO PAY FOR THE VERY BASICS LIKE FOOD, HOUSING, MEDICAL CARE, AND HEATING?: NOT HARD AT ALL

## 2022-05-25 SDOH — HEALTH STABILITY: MENTAL HEALTH
STRESS IS WHEN SOMEONE FEELS TENSE, NERVOUS, ANXIOUS, OR CAN'T SLEEP AT NIGHT BECAUSE THEIR MIND IS TROUBLED. HOW STRESSED ARE YOU?: ONLY A LITTLE

## 2022-05-25 SDOH — ECONOMIC STABILITY: TRANSPORTATION INSECURITY
IN THE PAST 12 MONTHS, HAS LACK OF RELIABLE TRANSPORTATION KEPT YOU FROM MEDICAL APPOINTMENTS, MEETINGS, WORK OR FROM GETTING THINGS NEEDED FOR DAILY LIVING?: NO

## 2022-05-25 SDOH — ECONOMIC STABILITY: HOUSING INSECURITY: IN THE LAST 12 MONTHS, HOW MANY PLACES HAVE YOU LIVED?: 1

## 2022-05-25 ASSESSMENT — SOCIAL DETERMINANTS OF HEALTH (SDOH)
WITHIN THE PAST 12 MONTHS, YOU WORRIED THAT YOUR FOOD WOULD RUN OUT BEFORE YOU GOT THE MONEY TO BUY MORE: NEVER TRUE
HOW OFTEN DO YOU ATTENT MEETINGS OF THE CLUB OR ORGANIZATION YOU BELONG TO?: MORE THAN 4 TIMES PER YEAR
HOW OFTEN DO YOU ATTEND CHURCH OR RELIGIOUS SERVICES?: MORE THAN 4 TIMES PER YEAR
HOW HARD IS IT FOR YOU TO PAY FOR THE VERY BASICS LIKE FOOD, HOUSING, MEDICAL CARE, AND HEATING?: NOT HARD AT ALL
HOW OFTEN DO YOU GET TOGETHER WITH FRIENDS OR RELATIVES?: ONCE A WEEK
DO YOU BELONG TO ANY CLUBS OR ORGANIZATIONS SUCH AS CHURCH GROUPS UNIONS, FRATERNAL OR ATHLETIC GROUPS, OR SCHOOL GROUPS?: YES
HOW OFTEN DO YOU ATTEND CHURCH OR RELIGIOUS SERVICES?: MORE THAN 4 TIMES PER YEAR
HOW MANY DRINKS CONTAINING ALCOHOL DO YOU HAVE ON A TYPICAL DAY WHEN YOU ARE DRINKING: PATIENT DOES NOT DRINK
HOW OFTEN DO YOU HAVE SIX OR MORE DRINKS ON ONE OCCASION: NEVER
HOW OFTEN DO YOU ATTENT MEETINGS OF THE CLUB OR ORGANIZATION YOU BELONG TO?: MORE THAN 4 TIMES PER YEAR
IN A TYPICAL WEEK, HOW MANY TIMES DO YOU TALK ON THE PHONE WITH FAMILY, FRIENDS, OR NEIGHBORS?: ONCE A WEEK
DO YOU BELONG TO ANY CLUBS OR ORGANIZATIONS SUCH AS CHURCH GROUPS UNIONS, FRATERNAL OR ATHLETIC GROUPS, OR SCHOOL GROUPS?: YES
HOW OFTEN DO YOU GET TOGETHER WITH FRIENDS OR RELATIVES?: ONCE A WEEK
HOW OFTEN DO YOU HAVE A DRINK CONTAINING ALCOHOL: NEVER
IN A TYPICAL WEEK, HOW MANY TIMES DO YOU TALK ON THE PHONE WITH FAMILY, FRIENDS, OR NEIGHBORS?: ONCE A WEEK

## 2022-05-25 ASSESSMENT — LIFESTYLE VARIABLES
AUDIT-C TOTAL SCORE: 0
HOW OFTEN DO YOU HAVE SIX OR MORE DRINKS ON ONE OCCASION: NEVER
SKIP TO QUESTIONS 9-10: 1
HOW OFTEN DO YOU HAVE A DRINK CONTAINING ALCOHOL: NEVER
HOW MANY STANDARD DRINKS CONTAINING ALCOHOL DO YOU HAVE ON A TYPICAL DAY: PATIENT DOES NOT DRINK

## 2022-05-26 ENCOUNTER — OFFICE VISIT (OUTPATIENT)
Dept: MEDICAL GROUP | Facility: PHYSICIAN GROUP | Age: 62
End: 2022-05-26
Payer: COMMERCIAL

## 2022-05-26 VITALS
HEIGHT: 70 IN | HEART RATE: 61 BPM | WEIGHT: 246.2 LBS | SYSTOLIC BLOOD PRESSURE: 132 MMHG | RESPIRATION RATE: 16 BRPM | TEMPERATURE: 97.4 F | DIASTOLIC BLOOD PRESSURE: 82 MMHG | BODY MASS INDEX: 35.24 KG/M2 | OXYGEN SATURATION: 95 %

## 2022-05-26 DIAGNOSIS — N40.1 BENIGN PROSTATIC HYPERPLASIA WITH URINARY RETENTION: ICD-10-CM

## 2022-05-26 DIAGNOSIS — R10.13 EPIGASTRIC PAIN: ICD-10-CM

## 2022-05-26 DIAGNOSIS — R33.8 BENIGN PROSTATIC HYPERPLASIA WITH URINARY RETENTION: ICD-10-CM

## 2022-05-26 DIAGNOSIS — Z00.00 PHYSICAL EXAM, ANNUAL: ICD-10-CM

## 2022-05-26 DIAGNOSIS — G47.33 OBSTRUCTIVE SLEEP APNEA SYNDROME: Chronic | ICD-10-CM

## 2022-05-26 DIAGNOSIS — E78.5 DYSLIPIDEMIA: ICD-10-CM

## 2022-05-26 DIAGNOSIS — Z12.11 SCREENING FOR COLON CANCER: ICD-10-CM

## 2022-05-26 PROCEDURE — 99214 OFFICE O/P EST MOD 30 MIN: CPT | Performed by: PHYSICIAN ASSISTANT

## 2022-05-26 RX ORDER — SILDENAFIL 100 MG/1
100 TABLET, FILM COATED ORAL
COMMUNITY
End: 2023-10-25

## 2022-05-26 RX ORDER — TAMSULOSIN HYDROCHLORIDE 0.4 MG/1
CAPSULE ORAL
COMMUNITY
End: 2022-05-26

## 2022-05-26 ASSESSMENT — PATIENT HEALTH QUESTIONNAIRE - PHQ9: CLINICAL INTERPRETATION OF PHQ2 SCORE: 0

## 2022-05-26 ASSESSMENT — FIBROSIS 4 INDEX: FIB4 SCORE: 1.67

## 2022-05-26 NOTE — PROGRESS NOTES
CC:    Chief Complaint   Patient presents with   • hospitals Care     Stomach pain for a year off and on       HISTORY OF THE PRESENT ILLNESS: Patient is a 61 y.o. male presenting to \[facility]\"" primary care     1. In 2013 pt had cholecystectomy. The surgeon cut too deep and cut his stomach wall. Afterwards went home and vomited blood. For 4-5 years afterwards would get intermittent abd pain. Pain eventually subsided but recently restarted. Pain lasts about 20 seconds about an hour after he eats. Also depends on what he eats. Feels it in epigastric region. He had EGD done afterwards and found where the lesion is located.   2. Pt's mother had colon CA so has been doing colonoscopy every 5 years.   3. Pt on crestor for hld. Sees cardiologist for management of his lipids.   4. Pt on flomax for BPH. Had urological surgery about 1.5 years ago. See urology.   5. Pt used to be on protonix for GERD. Doesn't feel like he needs it.   6. Pt has JOAQUINA, on BiPAP.     No problem-specific Assessment & Plan notes found for this encounter.    Allergies: Patient has no known allergies.    Current Outpatient Medications Ordered in Epic   Medication Sig Dispense Refill   • aspirin 81 MG EC tablet 81 mg.     • Ascorbic Acid (VITAMIN C) 1000 MG Tab Take 1,000 mg by mouth every bedtime.     • Omega-3 Fatty Acids (FISH OIL) 1000 MG Cap capsule Take 1,000 mg by mouth every bedtime.     • tamsulosin (FLOMAX) 0.4 MG capsule Take 0.4 mg by mouth every bedtime.     • tamsulosin (FLOMAX) 0.4 MG capsule tamsulosin 0.4 mg capsule   Take 1 capsule every day by oral route at bedtime for 90 days.   hold if having dizziness when standing up (Patient not taking: Reported on 5/26/2022)     • rosuvastatin (CRESTOR) 20 MG Tab Take 1 Tablet by mouth every day. (Patient not taking: Reported on 5/26/2022) 90 Tablet 3     No current Epic-ordered facility-administered medications on file.       Past Medical History:   Diagnosis Date   • BPH (benign prostatic  hyperplasia)    • Bronchitis    • Bulbous urethral stricture 10/23/2020   • Chickenpox    • Facial basal cell cancer 2007    lip, no recurrence   • GERD (gastroesophageal reflux disease)    • GI bleed    • High cholesterol    • HLD (hyperlipidemia)    • Kidney stone    • Sleep apnea        Past Surgical History:   Procedure Laterality Date   • URETHROPLASTY, STAGE 2, USING BUCCAL MUCOSAL GRAFT  10/23/2020    Procedure: URETHROPLASTY,STAGE 2,USING BUCCAL MUCOSAL GRAFTONE-STAGE RECONSTRUCTION OF MALE ANTERIOR URETHRA;  Surgeon: Tai Morales M.D.;  Location: [facility];  Service: Urology   • CYSTOSCOPY  10/23/2020    Procedure: CYSTOSCOPY WITH LITHOLITHALOPAXY;  Surgeon: Tai Morales M.D.;  Location: [facility];  Service: Urology   • EYE SURGERY  09/23/2019    Left eye laser surgery   • OR CYSTOSCOPY,INSERT URETERAL STENT  6/26/2019    Procedure: CYSTOSCOPY- LASER LITHOTRIPSY  FOR URETHERAL STONE.;  Surgeon: Dion Olivares M.D.;  Location: SUR[facility];  Service: Urology   • ARTHROPLASTY      right knee   • ARTHROSCOPY, KNEE     • CARPAL TUNNEL RELEASE     • EYE SURGERY      cataracts bilateral, corneal transplant   • EYE SURGERY      cataract AND corneal replacement BILATERALLY   • HERNIA REPAIR         Social History     Tobacco Use   • Smoking status: Never Smoker   • Smokeless tobacco: Never Used   Vaping Use   • Vaping Use: Never used   Substance Use Topics   • Alcohol use: Yes     Comment: Occasionally   • Drug use: No       Social History     Social History Narrative   • Not on file       Family History   Problem Relation Age of Onset   • Cancer Mother 60        colon cancer   • Heart Disease Mother         CHF       ROS:     - Constitutional: Negative for fever, chills, unexpected weight change, and fatigue/generalized weakness.     - HEENT: Negative for headaches, vision changes, hearing changes, ear pain, ear discharge, rhinorrhea, sinus congestion, sore throat, and  "neck pain.      - Respiratory: Negative for cough, sputum production, chest congestion, dyspnea, wheezing, and crackles.      - Cardiovascular: Negative for chest pain, palpitations, orthopnea, and bilateral lower extremity edema.     - Gastrointestinal:Positive for abd pain.  Negative for heartburn, nausea, vomiting, hematochezia, melena, diarrhea, constipation, and greasy/foul-smelling stools.     - Genitourinary: Negative for dysuria, polyuria, hematuria, pyuria, urinary urgency, and urinary incontinence.     - Musculoskeletal: Negative for myalgias, back pain, and joint pain.     - Skin: Negative for rash, itching, cyanotic skin color change.     - Neurological: Negative for dizziness, tingling, tremors, focal sensory deficit, focal weakness and headaches.     - Endo/Heme/Allergies: Does not bruise/bleed easily.     - Psychiatric/Behavioral: Negative for depression, suicidal/homicidal ideation and memory loss.            .      Exam: /82   Pulse 61   Temp 36.3 °C (97.4 °F) (Temporal)   Resp 16   Ht 1.778 m (5' 10\")   Wt 112 kg (246 lb 3.2 oz)   SpO2 95%  Body mass index is 35.33 kg/m².    General: Normal appearing. No acute distress.  Skin: Warm and dry.  No obvious lesions.  HEENT: Normocephalic. Eyes conjunctiva clear lids without ptosis, ears normal shape and contour  Cardiovascular: Regular rate and rhythm without murmur.   Respiratory: Clear to auscultation bilaterally, no rhonchi wheezing or rales.  Neurologic: Grossly nonfocal, A&O x3, gait normal,  Musculoskeletal: No deformity or swelling.   Extremities: No extremity cyanosis, clubbing, or edema.  Psych: Normal mood and affect. Judgment and insight is normal.    Please note that this dictation was created using voice recognition software. I have made every reasonable attempt to correct obvious errors, but I expect that there are errors of grammar and possibly content that I did not discover before finalizing the note.    LABS: 5/26/2022: " Results reviewed and discussed with the patient, questions answered.    Assessment/Plan  1. Epigastric pain  Will set up with GI for further evaluation  - Referral to Gastroenterology    2. Screening for colon cancer    - Referral to Gastroenterology  - Referral to GI for Colonoscopy    3. Physical exam, annual  Labs printed.   - CBC WITH DIFFERENTIAL; Future  - Comp Metabolic Panel; Future  - HEMOGLOBIN A1C; Future  - Lipid Profile; Future  - TSH WITH REFLEX TO FT4; Future    4. Benign prostatic hyperplasia with urinary retention  F/u with urology    5. Obstructive sleep apnea syndrome  Continue bipap    6. Dyslipidemia  Continue crestor. F/u with cardiology

## 2022-07-06 ENCOUNTER — OFFICE VISIT (OUTPATIENT)
Dept: MEDICAL GROUP | Facility: PHYSICIAN GROUP | Age: 62
End: 2022-07-06
Payer: COMMERCIAL

## 2022-07-06 VITALS
OXYGEN SATURATION: 96 % | RESPIRATION RATE: 16 BRPM | WEIGHT: 240.8 LBS | DIASTOLIC BLOOD PRESSURE: 76 MMHG | HEIGHT: 70 IN | HEART RATE: 63 BPM | BODY MASS INDEX: 34.47 KG/M2 | SYSTOLIC BLOOD PRESSURE: 138 MMHG | TEMPERATURE: 97.8 F

## 2022-07-06 DIAGNOSIS — Z00.00 PHYSICAL EXAM, ANNUAL: ICD-10-CM

## 2022-07-06 DIAGNOSIS — H61.23 BILATERAL IMPACTED CERUMEN: ICD-10-CM

## 2022-07-06 PROCEDURE — 99212 OFFICE O/P EST SF 10 MIN: CPT | Mod: 25 | Performed by: PHYSICIAN ASSISTANT

## 2022-07-06 PROCEDURE — 69210 REMOVE IMPACTED EAR WAX UNI: CPT | Performed by: PHYSICIAN ASSISTANT

## 2022-07-06 ASSESSMENT — FIBROSIS 4 INDEX: FIB4 SCORE: 1.67

## 2022-07-06 NOTE — PROGRESS NOTES
CC:  Chief Complaint   Patient presents with   • Ear Drainage     (L)    • Rib Pain     X2wk        HISTORY OF PRESENT ILLNESS: Patient is a 61 y.o. male established patient presenting with issues below.   1. Pt having L ear obstruction. Muffled hearing. No pain. Has hx of cerumen impaction. Using Q-tips.   2. Pt having slight discomfort over his xiphoid process. No injury.     No problem-specific Assessment & Plan notes found for this encounter.      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    Current Outpatient Medications   Medication Sig Dispense Refill   • sildenafil citrate (VIAGRA) 100 MG tablet Take 100 mg by mouth 1 time a day as needed.     • aspirin 81 MG EC tablet 81 mg.     • rosuvastatin (CRESTOR) 20 MG Tab Take 1 Tablet by mouth every day. 90 Tablet 3   • Ascorbic Acid (VITAMIN C) 1000 MG Tab Take 1,000 mg by mouth every bedtime.     • Omega-3 Fatty Acids (FISH OIL) 1000 MG Cap capsule Take 1,000 mg by mouth every bedtime.     • tamsulosin (FLOMAX) 0.4 MG capsule Take 0.4 mg by mouth every bedtime.       No current facility-administered medications for this visit.       Social History     Tobacco Use   • Smoking status: Never Smoker   • Smokeless tobacco: Never Used   Vaping Use   • Vaping Use: Never used   Substance Use Topics   • Alcohol use: Yes     Comment: Occasionally   • Drug use: No     Social History     Social History Narrative   • Not on  "file       Family History   Problem Relation Age of Onset   • Cancer Mother 60        colon cancer   • Heart Disease Mother         CHF        ROS:     - Constitutional:  Negative for fever, chills, unexpected weight change, and fatigue/generalized weakness.    - HEENT: Positive for ear blockage. Negative for headaches, vision changes, hearing changes, ear pain, ear discharge, rhinorrhea, sinus congestion, sore throat, and neck pain.      - Respiratory: Negative for cough, sputum production, chest congestion, dyspnea, wheezing, and crackles.       - Musculoskeletal:Positive for rib pain. Negative for myalgias, back pain, and joint pain.         Exam:    /76   Pulse 63   Temp 36.6 °C (97.8 °F) (Temporal)   Resp 16   Ht 1.778 m (5' 10\")   Wt 109 kg (240 lb 12.8 oz)   SpO2 96%  Body mass index is 34.55 kg/m².    General:  Well nourished, well developed male in NAD  HEENT: Positive for bilateral cerumen impaction  Neck: Supple. Thyroid is not enlarged.  Musculoskeletal: xiphoid process palpable, no discernable abnormality  Skin: Warm and dry. No obvious lesions  Neuro: Normal muscle tone. Gait normal. Alert and oriented.  Psych: Normal mood and affect      Please note that this dictation was created using voice recognition software. I have made every reasonable attempt to correct obvious errors, but I expect that there are errors of grammar and possibly content that I did not discover before finalizing the note.      Assessment/Plan:  1. Bilateral impacted cerumen      My medical assistant irrigated the bilateral ears with warm water. Consent obtained. A large cerumen plug was removed with irrigation and manual removal by myself with currette was not necessary. Procedure was tolerated well. Advised against future Q-tip use.     2. Physical exam, annual  PE conducted       "

## 2022-08-19 ENCOUNTER — OFFICE VISIT (OUTPATIENT)
Dept: SLEEP MEDICINE | Facility: MEDICAL CENTER | Age: 62
End: 2022-08-19
Payer: COMMERCIAL

## 2022-08-19 VITALS
HEART RATE: 72 BPM | BODY MASS INDEX: 34.59 KG/M2 | WEIGHT: 241.6 LBS | RESPIRATION RATE: 16 BRPM | OXYGEN SATURATION: 96 % | DIASTOLIC BLOOD PRESSURE: 70 MMHG | SYSTOLIC BLOOD PRESSURE: 126 MMHG | HEIGHT: 70 IN

## 2022-08-19 DIAGNOSIS — G47.33 OBSTRUCTIVE SLEEP APNEA SYNDROME: ICD-10-CM

## 2022-08-19 PROCEDURE — 99213 OFFICE O/P EST LOW 20 MIN: CPT | Performed by: NURSE PRACTITIONER

## 2022-08-19 ASSESSMENT — FIBROSIS 4 INDEX: FIB4 SCORE: 1.67

## 2022-08-19 NOTE — PROGRESS NOTES
Chief Complaint   Patient presents with    Apnea       HPI:  Edd Beal is a 61 y.o. year old male here today for follow-up on annual JOAQUINA.  Last seen 3/9/2021 by KAROLINA Cardenas. Currently using BIPAP 19/15cm (adjusted at last OV). Current device obtained 2020.  Patient is currently using a fullface mask and denies any difficulty with pressures, but does experience air leaks through the mask.  He does not replace because regularly.  Overall he notes improved sleep quality, but does take naps during the day.  He is sleeping approximately 6 to 8 hours daily.  He denies any excessive daytime sleepiness, morning headaches, palpitations.    Compliance was reviewed with patient and does show 100% use with an average time of 6 hours and 50 minutes.  AHI is 7.1.  There is evidence of excessive leakage on compliance.    SLEEP HX:  He has been on therapy for >15yrs.  PSG split night 5/28/20 indicates sleep efficiency was 61%, severe sleep apnea with overall AHI of 83.5/h and O2 tim of 86%.  He was titrated on CPAP and BiPAP with an incomplete titration with best response to bilevel 21/17 cm with reduced AHI of 8.57/h and O2 tim of 94%.  Recommendation was beginning bilevel 21/17 cm or 22/18 cm with a large Vitera mask with close follow-up.       ROS: As per HPI and otherwise negative if not stated.    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    Chickenpox     Facial basal cell cancer 2007    lip, no recurrence    GERD (gastroesophageal reflux disease)     GI bleed     High cholesterol     HLD (hyperlipidemia)     Kidney stone     Sleep apnea        Past Surgical History:   Procedure Laterality Date    URETHROPLASTY, STAGE 2, USING BUCCAL MUCOSAL GRAFT  10/23/2020    Procedure: URETHROPLASTY,STAGE 2,USING BUCCAL MUCOSAL GRAFTONE-STAGE RECONSTRUCTION OF MALE ANTERIOR URETHRA;  Surgeon: Tai Morales M.D.;   Location: SURGERY [facility];  Service: Urology    CYSTOSCOPY  10/23/2020    Procedure: CYSTOSCOPY WITH LITHOLITHALOPAXY;  Surgeon: Tai Morales M.D.;  Location: SURGERY [facility];  Service: Urology    EYE SURGERY  09/23/2019    Left eye laser surgery    OR CYSTOSCOPY,INSERT URETERAL STENT  6/26/2019    Procedure: CYSTOSCOPY- LASER LITHOTRIPSY  FOR URETHERAL STONE.;  Surgeon: Dion Olivares M.D.;  Location: SURGERY [facility];  Service: Urology    ARTHROPLASTY      right knee    ARTHROSCOPY, KNEE      CARPAL TUNNEL RELEASE      EYE SURGERY      cataracts bilateral, corneal transplant    EYE SURGERY      cataract AND corneal replacement BILATERALLY    HERNIA REPAIR         Family History   Problem Relation Age of Onset    Cancer Mother 60        colon cancer    Heart Disease Mother         CHF       Allergies as of 08/19/2022    (No Known Allergies)        Vitals:  There were no vitals taken for this visit.    Current medications as of today   Current Outpatient Medications   Medication Sig Dispense Refill    sildenafil citrate (VIAGRA) 100 MG tablet Take 100 mg by mouth 1 time a day as needed.      aspirin 81 MG EC tablet 81 mg.      rosuvastatin (CRESTOR) 20 MG Tab Take 1 Tablet by mouth every day. 90 Tablet 3    Ascorbic Acid (VITAMIN C) 1000 MG Tab Take 1,000 mg by mouth every bedtime.      Omega-3 Fatty Acids (FISH OIL) 1000 MG Cap capsule Take 1,000 mg by mouth every bedtime.      tamsulosin (FLOMAX) 0.4 MG capsule Take 0.4 mg by mouth every bedtime.       No current facility-administered medications for this visit.         Physical Exam:   Gen:           Alert and oriented, No apparent distress. Mood and affect appropriate, normal interaction with examiner.  Eyes:          PERRL, EOM intact, sclere white, conjunctive moist.  Ears:          Not examined.   Hearing:     Grossly intact.  Nose:          Normal, no lesions or deformities.  Dentition:    Good dentition.  Oropharynx:   Tongue  normal, posterior pharynx without erythema or exudate.  Neck:        Supple, trachea midline, no masses.  Respiratory Effort: No intercostal retractions or use of accessory muscles.   Lung Auscultation:      Clear to auscultation bilaterally; no rales, rhonchi or wheezing.  CV:            Regular rate and rhythm. No murmurs, rubs or gallops.  Abd:           Not examined.   Lymphadenopathy: Not examined.  Gait and Station: Normal.  Digits and Nails: No clubbing, cyanosis, petechiae, or nodes.   Cranial Nerves: II-XII grossly intact.  Skin:        No rashes, lesions or ulcers noted.               Ext:           No cyanosis or edema.      Assessment:  1. Obstructive sleep apnea syndrome            Plan:  Patient is using and benefiting with BiPAP therapy.  Compliance shows adequate use of BiPAP.  Compliance also shows excessive leakage.  Recommended patient replace his water chamber, tubing, and get mask fitting for different mask.  He does have facial hair.  I recommend a ResMed AirFit F 30 or F 30 I.  Patient will message me once complete and I can check compliance to see if leakage has improved.  Current AHI of 7.  Follow-up annually, or sooner if needed.    Please note that this dictation was created using voice recognition software. I have made every reasonable attempt to correct obvious errors, but it is possible there are errors of grammar and possibly content that I did not discover before finalizing the note.

## 2022-08-31 ENCOUNTER — OFFICE VISIT (OUTPATIENT)
Dept: CARDIOLOGY | Facility: MEDICAL CENTER | Age: 62
End: 2022-08-31
Payer: COMMERCIAL

## 2022-08-31 ENCOUNTER — TELEPHONE (OUTPATIENT)
Dept: CARDIOLOGY | Facility: MEDICAL CENTER | Age: 62
End: 2022-08-31

## 2022-08-31 VITALS
HEIGHT: 70 IN | DIASTOLIC BLOOD PRESSURE: 80 MMHG | RESPIRATION RATE: 16 BRPM | BODY MASS INDEX: 35.07 KG/M2 | HEART RATE: 76 BPM | OXYGEN SATURATION: 97 % | WEIGHT: 245 LBS | SYSTOLIC BLOOD PRESSURE: 132 MMHG

## 2022-08-31 DIAGNOSIS — E78.5 DYSLIPIDEMIA: ICD-10-CM

## 2022-08-31 DIAGNOSIS — E78.1 ABNORMALLY LOW HIGH DENSITY LIPOPROTEIN (HDL) CHOLESTEROL WITH HYPERTRIGLYCERIDEMIA: ICD-10-CM

## 2022-08-31 DIAGNOSIS — G47.33 OBSTRUCTIVE SLEEP APNEA SYNDROME: Chronic | ICD-10-CM

## 2022-08-31 DIAGNOSIS — R93.1 HIGH CORONARY ARTERY CALCIUM SCORE: ICD-10-CM

## 2022-08-31 DIAGNOSIS — R06.02 SHORTNESS OF BREATH: ICD-10-CM

## 2022-08-31 DIAGNOSIS — R07.89 OTHER CHEST PAIN: ICD-10-CM

## 2022-08-31 DIAGNOSIS — E78.6 ABNORMALLY LOW HIGH DENSITY LIPOPROTEIN (HDL) CHOLESTEROL WITH HYPERTRIGLYCERIDEMIA: ICD-10-CM

## 2022-08-31 DIAGNOSIS — E66.9 OBESITY (BMI 30-39.9): ICD-10-CM

## 2022-08-31 LAB — EKG IMPRESSION: NORMAL

## 2022-08-31 PROCEDURE — 99214 OFFICE O/P EST MOD 30 MIN: CPT | Performed by: PHYSICIAN ASSISTANT

## 2022-08-31 PROCEDURE — 93000 ELECTROCARDIOGRAM COMPLETE: CPT | Performed by: INTERNAL MEDICINE

## 2022-08-31 RX ORDER — NITROGLYCERIN 0.4 MG/1
0.4 TABLET SUBLINGUAL PRN
Qty: 25 TABLET | Refills: 0 | Status: SHIPPED | OUTPATIENT
Start: 2022-08-31 | End: 2022-11-10

## 2022-08-31 ASSESSMENT — ENCOUNTER SYMPTOMS
FEVER: 0
HEADACHES: 0
PALPITATIONS: 0
VOMITING: 0
DIZZINESS: 0
LOSS OF CONSCIOUSNESS: 0
MUSCULOSKELETAL NEGATIVE: 1
PND: 0
BRUISES/BLEEDS EASILY: 0
GASTROINTESTINAL NEGATIVE: 1
CONSTITUTIONAL NEGATIVE: 1
NAUSEA: 0
MYALGIAS: 0
DOUBLE VISION: 0
NEUROLOGICAL NEGATIVE: 1
EYES NEGATIVE: 1
WEAKNESS: 0
CLAUDICATION: 0
CHILLS: 0
COUGH: 0
SHORTNESS OF BREATH: 1
PSYCHIATRIC NEGATIVE: 1
ABDOMINAL PAIN: 0
ORTHOPNEA: 0
BLURRED VISION: 0

## 2022-08-31 ASSESSMENT — FIBROSIS 4 INDEX: FIB4 SCORE: 1.67

## 2022-08-31 NOTE — TELEPHONE ENCOUNTER
AM    Caller: Edd Beal    Topic/issue: STRESS TEST    Patient would like for this order to be faxed to [facility]. Please advise.    Thank you,  Vishal PULIDO    Callback Number: [phone] (home)

## 2022-08-31 NOTE — PROGRESS NOTES
"Chief Complaint   Patient presents with    Chest Pain    Dyslipidemia       Subjective     Edd Beal is a 61 y.o. male with a history of elevated coronary calcium score, hyperlipidemia, JOAQUINA and GERD who presents today for follow up.    He is a prior patient of Dr. Oakes. Last seen by Sarah Garza 10/14/2021. At that visit, he was feeling well and no changes were made.    Today, he presents with his wife. He states that over the past few months he has been experiencing a few episodes of mid left pectoral nonradiating chest pain on exertion and shortness of breath that has become more noticeable over the past month. Most instances have occurred after or during sexual activity. The pain usually lasts anywhere from 5-10 minutes and is alleviated by rest. The chest pain is described as \"someone poking me in the chest.\" The most recent episode was approximately 2 weeks ago. He has also been noticing shortness of breath while walking up a flight of stairs and can only walk up one flight before needed to stop and catch his breath.  Denies any current chest pain or palpitations. No orthopnea or PND. No dizziness or lightheadedness. No lower extremity edema.     He does not drink or smoke.     Patient did also have chest wall pain in 8/2019 with an elevated coronary calcification score and negative treadmill stress test.    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    Chickenpox     Facial basal cell cancer 2007    lip, no recurrence    GERD (gastroesophageal reflux disease)     GI bleed     High cholesterol     HLD (hyperlipidemia)     Kidney stone     Sleep apnea      Past Surgical History:   Procedure Laterality Date    URETHROPLASTY, STAGE 2, USING BUCCAL MUCOSAL GRAFT  10/23/2020    Procedure: URETHROPLASTY,STAGE 2,USING BUCCAL MUCOSAL GRAFTONE-STAGE RECONSTRUCTION OF MALE ANTERIOR URETHRA;  Surgeon: Tai Morales, " M.D.;  Location: SURGERY [facility];  Service: Urology    CYSTOSCOPY  10/23/2020    Procedure: CYSTOSCOPY WITH LITHOLITHALOPAXY;  Surgeon: Tai Morales M.D.;  Location: SURGERY [facility];  Service: Urology    EYE SURGERY  09/23/2019    Left eye laser surgery    [place] CYSTOSCOPY,INSERT URETERAL STENT  6/26/2019    Procedure: CYSTOSCOPY- LASER LITHOTRIPSY  FOR URETHERAL STONE.;  Surgeon: Dion Olivares M.D.;  Location: SURGERY [facility];  Service: Urology    ARTHROPLASTY      right knee    ARTHROSCOPY, KNEE      CARPAL TUNNEL RELEASE      EYE SURGERY      cataracts bilateral, corneal transplant    EYE SURGERY      cataract AND corneal replacement BILATERALLY    HERNIA REPAIR       Family History   Problem Relation Age of Onset    Cancer Mother 60        colon cancer    Heart Disease Mother         CHF     Social History     Socioeconomic History    Marital status:      Spouse name: Not on file    Number of children: Not on file    Years of education: Not on file    Highest education level: 12th grade   Occupational History    Occupation: Maintenance at [employer]   Tobacco Use    Smoking status: Never    Smokeless tobacco: Never   Vaping Use    Vaping Use: Never used   Substance and Sexual Activity    Alcohol use: Yes     Comment: Occasionally    Drug use: No    Sexual activity: Not Currently   Other Topics Concern    Not on file   Social History Narrative    Not on file     Social Determinants of Health     Financial Resource Strain: Low Risk     Difficulty of Paying Living Expenses: Not hard at all   Food Insecurity: No Food Insecurity    Worried About Running Out of Food in the Last Year: Never true    Ran Out of Food in the Last Year: Never true   Transportation Needs: No Transportation Needs    Lack of Transportation (Medical): No    Lack of Transportation (Non-Medical): No   Physical Activity: Sufficiently Active    Days of Exercise per Week: 4 days    Minutes of Exercise per Session: 60  min   Stress: No Stress Concern Present    Feeling of Stress : Only a little   Social Connections: Moderately Integrated    Frequency of Communication with Friends and Family: Once a week    Frequency of Social Gatherings with Friends and Family: Once a week    Attends Christianity Services: More than 4 times per year    Active Member of Clubs or Organizations: Yes    Attends Club or Organization Meetings: More than 4 times per year    Marital Status:    Intimate Partner Violence: Not on file   Housing Stability: Low Risk     Unable to Pay for Housing in the Last Year: No    Number of Places Lived in the Last Year: 1    Unstable Housing in the Last Year: No     No Known Allergies  Outpatient Encounter Medications as of 8/31/2022   Medication Sig Dispense Refill    nitroglycerin (NITROSTAT) 0.4 MG SL Tab Place 1 Tablet under the tongue as needed for Chest Pain (Take 1 tablet for any chest pain lasting longer than 5 minutes. If the chest pain persists, you can take a second dose of nitro after 10 minutes and call 911.). 25 Tablet 0    sildenafil citrate (VIAGRA) 100 MG tablet Take 100 mg by mouth 1 time a day as needed.      aspirin 81 MG EC tablet 81 mg.      rosuvastatin (CRESTOR) 20 MG Tab Take 1 Tablet by mouth every day. 90 Tablet 3    Ascorbic Acid (VITAMIN C) 1000 MG Tab Take 1,000 mg by mouth every bedtime.      Omega-3 Fatty Acids (FISH OIL) 1000 MG Cap capsule Take 1,000 mg by mouth every bedtime.      tamsulosin (FLOMAX) 0.4 MG capsule Take 0.4 mg by mouth every bedtime.       No facility-administered encounter medications on file as of 8/31/2022.     Review of Systems   Constitutional: Negative.  Negative for chills, fever and malaise/fatigue.   HENT: Negative.     Eyes: Negative.  Negative for blurred vision and double vision.   Respiratory:  Positive for shortness of breath (upon exertion). Negative for cough.    Cardiovascular:  Positive for chest pain (upon exertion). Negative for palpitations,  "orthopnea, claudication, leg swelling and PND.   Gastrointestinal: Negative.  Negative for abdominal pain, nausea and vomiting.   Genitourinary: Negative.    Musculoskeletal: Negative.  Negative for myalgias.   Skin: Negative.  Negative for rash.   Neurological: Negative.  Negative for dizziness, loss of consciousness, weakness and headaches.   Endo/Heme/Allergies: Negative.  Does not bruise/bleed easily.   Psychiatric/Behavioral: Negative.              Objective     /80 (BP Location: Left arm, Patient Position: Sitting, BP Cuff Size: Adult)   Pulse 76   Resp 16   Ht 1.778 m (5' 10\")   Wt 111 kg (245 lb)   SpO2 97%   BMI 35.15 kg/m²     Physical Exam  Vitals reviewed.   Constitutional:       General: He is not in acute distress.     Appearance: Normal appearance.   HENT:      Head: Normocephalic and atraumatic.      Right Ear: External ear normal.      Left Ear: External ear normal.   Eyes:      General: No scleral icterus.     Extraocular Movements: Extraocular movements intact.      Conjunctiva/sclera: Conjunctivae normal.      Pupils: Pupils are equal, round, and reactive to light.   Neck:      Vascular: No JVD.   Cardiovascular:      Rate and Rhythm: Normal rate and regular rhythm.      Pulses: Normal pulses.      Heart sounds: Normal heart sounds. No murmur heard.    No friction rub. No gallop.   Pulmonary:      Effort: Pulmonary effort is normal.      Breath sounds: Normal breath sounds.   Chest:      Chest wall: No tenderness.   Abdominal:      General: Bowel sounds are normal.      Palpations: Abdomen is soft.      Tenderness: There is no abdominal tenderness.   Musculoskeletal:         General: Normal range of motion.      Cervical back: Normal range of motion and neck supple.      Right lower leg: No edema.      Left lower leg: No edema.   Skin:     General: Skin is warm and dry.      Capillary Refill: Capillary refill takes less than 2 seconds.   Neurological:      General: No focal deficit " present.      Mental Status: He is alert and oriented to person, place, and time.   Psychiatric:         Mood and Affect: Mood normal.         Behavior: Behavior normal.         Judgment: Judgment normal.          Lab Results   Component Value Date/Time    CHOLSTRLTOT 108 10/18/2021 07:14 AM    LDL 46 10/18/2021 07:14 AM    HDL 27 (A) 10/18/2021 07:14 AM    TRIGLYCERIDE 175 (H) 10/18/2021 07:14 AM       Lab Results   Component Value Date/Time    SODIUM 142 10/18/2021 07:14 AM    POTASSIUM 4.2 10/18/2021 07:14 AM    CHLORIDE 108 10/18/2021 07:14 AM    CO2 23 10/18/2021 07:14 AM    GLUCOSE 121 (H) 10/18/2021 07:14 AM    BUN 13 10/18/2021 07:14 AM    CREATININE 0.96 10/18/2021 07:14 AM     Lab Results   Component Value Date/Time    ALKPHOSPHAT 94 10/18/2021 07:14 AM    ASTSGOT 30 10/18/2021 07:14 AM    ALTSGPT 41 10/18/2021 07:14 AM    TBILIRUBIN 1.0 10/18/2021 07:14 AM      EKG 8/31/2022  Personally interpreted by me as Sinus Arrhythmia with left axis deviation. No bradycardia or atrial premature complexes present compared to last EKG on 10/19/2020.      Assessment & Plan     1. Other chest pain  Cardiac Stress Test Treadmill Only    EC-ECHOCARDIOGRAM COMPLETE W/O CONT    EKG - Clinic Performed      2. High coronary artery calcium score        3. Shortness of breath        4. Obstructive sleep apnea syndrome        5. Dyslipidemia        6. Abnormally low high density lipoprotein (HDL) cholesterol with hypertriglyceridemia        7. Obesity (BMI 30-39.9)            Medical Decision Making: Today's Assessment/Status/Plan:        1. Chest pain/elevated coronary calcium score/shortness of breath:  - Instances of chest pain have increased over the past month. Shortness of breath associated with chest pain and additional shortness of breath on exertion.   - EKG today is without evidence of acute ischemia.   - Last treadmill stress test in 2019 showed no ischemia. Repeat treadmill stress testing.   - Ordered  echocardiogram.  - Start nitro 0.4mg sublingual q5 mins PRN chest pain. He may also take an additional 0.4mg tablet at 10 minutes if chest pain persists and call 911.  - Discussed with patient that he should not take nitroglycerine within 24hrs of taking Viagra due to potential significant hypotension. He indicated understanding.    - Discussed ED return precautions for any chest pain lasting longer than 15 minutes, worsening and/or not alleviated by nitro. He verbalized understanding.   - Continue asa and rosuvastatin 20mg daily    2. Hyperlipidemia and hypertriglyceridemia:  - LDL 46  - Hypertriglyceridemia  175. Improved from prior.   - Consider repeating lipid panel at next exam.  - Continue rosuvastatin 20mg daily    3. JOAQUINA  - Patient reports good compliance.  - Continue regular use of CPAP.  - Follow up per pulmonary.    Follow up in 4 weeks with general cardiology to review echo/stress testing results.    Thank you for allowing me to participate in the care of Edd Beal .    Radha Gonzales PA-C, Cardiology  [facility], [address]  [address] [address]  Phone: [phone]  Fax: [phone]    PLEASE NOTE: This Note was created using voice recognition Software. I have made every reasonable attempt to correct obvious errors, but I expect that there are errors of grammar and possibly content that I did not discover before finalizing the note.

## 2022-09-05 ENCOUNTER — HOSPITAL ENCOUNTER (OUTPATIENT)
Dept: CARDIOLOGY | Facility: MEDICAL CENTER | Age: 62
End: 2022-09-05
Attending: PHYSICIAN ASSISTANT
Payer: COMMERCIAL

## 2022-09-05 DIAGNOSIS — R07.89 OTHER CHEST PAIN: ICD-10-CM

## 2022-09-05 LAB
LV EJECT FRACT  99904: 55
LV EJECT FRACT MOD 2C 99903: 59.19
LV EJECT FRACT MOD 4C 99902: 44.76
LV EJECT FRACT MOD BP 99901: 50.73

## 2022-09-05 PROCEDURE — 93306 TTE W/DOPPLER COMPLETE: CPT | Mod: 26 | Performed by: INTERNAL MEDICINE

## 2022-09-05 PROCEDURE — 93306 TTE W/DOPPLER COMPLETE: CPT

## 2022-10-20 ENCOUNTER — NON-PROVIDER VISIT (OUTPATIENT)
Dept: URGENT CARE | Facility: CLINIC | Age: 62
End: 2022-10-20

## 2022-10-20 PROCEDURE — 8907 PR URINE COLLECT ONLY: Performed by: NURSE PRACTITIONER

## 2022-11-10 ENCOUNTER — OFFICE VISIT (OUTPATIENT)
Dept: CARDIOLOGY | Facility: MEDICAL CENTER | Age: 62
End: 2022-11-10
Payer: COMMERCIAL

## 2022-11-10 VITALS
WEIGHT: 238 LBS | HEIGHT: 70 IN | SYSTOLIC BLOOD PRESSURE: 120 MMHG | HEART RATE: 74 BPM | OXYGEN SATURATION: 95 % | DIASTOLIC BLOOD PRESSURE: 80 MMHG | RESPIRATION RATE: 16 BRPM | BODY MASS INDEX: 34.07 KG/M2

## 2022-11-10 DIAGNOSIS — R93.1 ELEVATED CORONARY ARTERY CALCIUM SCORE: ICD-10-CM

## 2022-11-10 DIAGNOSIS — E78.2 HYPERCHOLESTEROLEMIA WITH HYPERTRIGLYCERIDEMIA: ICD-10-CM

## 2022-11-10 PROCEDURE — 99214 OFFICE O/P EST MOD 30 MIN: CPT | Performed by: INTERNAL MEDICINE

## 2022-11-10 RX ORDER — ROSUVASTATIN CALCIUM 20 MG/1
20 TABLET, COATED ORAL DAILY
Qty: 100 TABLET | Refills: 3 | Status: SHIPPED | OUTPATIENT
Start: 2022-11-10 | End: 2023-10-25

## 2022-11-11 NOTE — PROGRESS NOTES
Cardiology Follow-up Consultation Note    Date of note:    11/10/2022    Primary Care Provider: Guillermo Duvall P.A.-C.    Name:             Edd Beal   YOB: 1960  MRN:               [id number]    Chief Complaint   Patient presents with    Dyslipidemia    Chest Pain     F/V Dx: Other chest pain       HISTORY OF PRESENT ILLNESS  Mr. Edd Beal is a 61 y.o. male who returns to see us for follow-up of elevated CAC score and family history of CAD    Pertinent History:  Went to the ER in 2019 with chest pain.  Underwent chest CTA which was negative for PE but showed coronary calcification  Established with Dr. Oakes: Elevated coronary calcium score 1193  Was initiated on Crestor 20 mg  Family history of heart disease: Father had a heart attack at age 54.  Mother had heart attack in her 60s and  at 67 from complications of CHF and diabetes  Mixed hyperlipidemia    Last clinic visit: 2022 with PREETI Sterling.  New to me.    Interim History:  Since his last visit, has been doing well from a cardiovascular perspective.  Gets occasional episodes of chest pain which resolves quickly.    In terms of physical activity, works at [employer] and is quite active with walking, lifting heavy boxes and is always moving around.  No chest pain or pressure or palpitations with exertion.    Stopped taking Crestor probably a year ago.  No side effects, is taking bergamot supplement.      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    Chickenpox     Facial basal cell cancer 2007    lip, no recurrence    GERD (gastroesophageal reflux disease)     GI bleed     High cholesterol     HLD (hyperlipidemia)     Kidney stone     Sleep apnea          Past Surgical History:   Procedure Laterality Date    URETHROPLASTY, STAGE 2, USING BUCCAL MUCOSAL GRAFT  10/23/2020    Procedure: URETHROPLASTY,STAGE 2,USING  BUCCAL MUCOSAL GRAFTONE-STAGE RECONSTRUCTION OF MALE ANTERIOR URETHRA;  Surgeon: Tai Morales M.D.;  Location: SURGERY [facility];  Service: Urology    CYSTOSCOPY  10/23/2020    Procedure: CYSTOSCOPY WITH LITHOLITHALOPAXY;  Surgeon: Tai Morales M.D.;  Location: SURGERY [facility];  Service: Urology    EYE SURGERY  09/23/2019    Left eye laser surgery    [place] CYSTOSCOPY,INSERT URETERAL STENT  6/26/2019    Procedure: CYSTOSCOPY- LASER LITHOTRIPSY  FOR URETHERAL STONE.;  Surgeon: Dion Olivares M.D.;  Location: SURGERY [facility];  Service: Urology    ARTHROPLASTY      right knee    ARTHROSCOPY, KNEE      CARPAL TUNNEL RELEASE      EYE SURGERY      cataracts bilateral, corneal transplant    EYE SURGERY      cataract AND corneal replacement BILATERALLY    HERNIA REPAIR           Current Outpatient Medications   Medication Sig Dispense Refill    rosuvastatin (CRESTOR) 20 MG Tab Take 1 Tablet by mouth every day. 100 Tablet 3    sildenafil citrate (VIAGRA) 100 MG tablet Take 1 Tablet by mouth 1 time a day as needed.      aspirin 81 MG EC tablet 1 Tablet.      Ascorbic Acid (VITAMIN C) 1000 MG Tab Take 1 Tablet by mouth at bedtime.      Omega-3 Fatty Acids (FISH OIL) 1000 MG Cap capsule Take 1 Capsule by mouth at bedtime.      tamsulosin (FLOMAX) 0.4 MG capsule Take 1 Capsule by mouth at bedtime.       No current facility-administered medications for this visit.         No Known Allergies      Family History   Problem Relation Age of Onset    Cancer Mother 60        colon cancer    Heart Disease Mother         CHF         Social History     Socioeconomic History    Marital status:      Spouse name: Not on file    Number of children: Not on file    Years of education: Not on file    Highest education level: 12th grade   Occupational History    Occupation: Maintenance at [employer]   Tobacco Use    Smoking status: Never    Smokeless tobacco: Never   Vaping Use    Vaping Use: Never used   Substance  "and Sexual Activity    Alcohol use: Yes     Comment: Occasionally    Drug use: No    Sexual activity: Not Currently   Other Topics Concern    Not on file   Social History Narrative    Not on file     Social Determinants of Health     Financial Resource Strain: Low Risk     Difficulty of Paying Living Expenses: Not hard at all   Food Insecurity: No Food Insecurity    Worried About Running Out of Food in the Last Year: Never true    Ran Out of Food in the Last Year: Never true   Transportation Needs: No Transportation Needs    Lack of Transportation (Medical): No    Lack of Transportation (Non-Medical): No   Physical Activity: Sufficiently Active    Days of Exercise per Week: 4 days    Minutes of Exercise per Session: 60 min   Stress: No Stress Concern Present    Feeling of Stress : Only a little   Social Connections: Moderately Integrated    Frequency of Communication with Friends and Family: Once a week    Frequency of Social Gatherings with Friends and Family: Once a week    Attends Christianity Services: More than 4 times per year    Active Member of Clubs or Organizations: Yes    Attends Club or Organization Meetings: More than 4 times per year    Marital Status:    Intimate Partner Violence: Not on file   Housing Stability: Low Risk     Unable to Pay for Housing in the Last Year: No    Number of Places Lived in the Last Year: 1    Unstable Housing in the Last Year: No         Physical Exam:  Ambulatory Vitals  /80 (BP Location: Left arm, Patient Position: Sitting, BP Cuff Size: Adult)   Pulse 74   Resp 16   Ht 1.778 m (5' 10\")   Wt 108 kg (238 lb)   SpO2 95%    Oxygen Therapy:  Pulse Oximetry: 95 %  BP Readings from Last 4 Encounters:   11/10/22 120/80   08/31/22 132/80   08/19/22 126/70   07/06/22 138/76       Weight/BMI: Body mass index is 34.15 kg/m².  Wt Readings from Last 4 Encounters:   11/10/22 108 kg (238 lb)   08/31/22 111 kg (245 lb)   08/19/22 110 kg (241 lb 9.6 oz)   07/06/22 109 kg " (240 lb 12.8 oz)       GEN: Well developed, well nourished and in no acute distress.  HEART: no significant JVD, regular rate and rhythm, normal S1 and S2, no murmurs, no third heart sounds, normal cardiac palpation  LUNG: clear to auscultation bilaterally, no wheezing, no crackles, normal respiratory effort on room air  ABDOMEN: soft, non-tender, non-distended, normal bowel sounds throughout  EXTREMITIES: no peripheral edema noted  VASCULAR: no significantly elevated jugular venous pressure, no carotid bruits noted, radial pulses 2+ and equal      Lab Data Review:  Lab Results   Component Value Date/Time    CHOLSTRLTOT 108 10/18/2021 07:14 AM    LDL 46 10/18/2021 07:14 AM    HDL 27 (A) 10/18/2021 07:14 AM    TRIGLYCERIDE 175 (H) 10/18/2021 07:14 AM       Lab Results   Component Value Date/Time    SODIUM 142 10/18/2021 07:14 AM    POTASSIUM 4.2 10/18/2021 07:14 AM    CHLORIDE 108 10/18/2021 07:14 AM    CO2 23 10/18/2021 07:14 AM    GLUCOSE 121 (H) 10/18/2021 07:14 AM    BUN 13 10/18/2021 07:14 AM    CREATININE 0.96 10/18/2021 07:14 AM     Lab Results   Component Value Date/Time    ALKPHOSPHAT 94 10/18/2021 07:14 AM    ASTSGOT 30 10/18/2021 07:14 AM    ALTSGPT 41 10/18/2021 07:14 AM    TBILIRUBIN 1.0 10/18/2021 07:14 AM      Lab Results   Component Value Date/Time    WBC 6.6 10/06/2020 12:27 PM     Lab Results   Component Value Date/Time    HBA1C 5.5 06/17/2022 01:48 PM    HBA1C 5.4 10/18/2021 07:14 AM       Cardiac Imaging and Procedures Review:    EKG dated 8/31/2022: My personal interpretation is sinus rhythm    Echo dated 9/5/2022: Personally interpreted:  Normal LV size and function.  No valvular abnormality      Radiology test Review:  CTA Chest 8/21/2019: IMPRESSION:  No evidence of pulmonary embolus.  Coronary artery calcifications.     CCS (2019):   Coronary calcification:  LMA - 0.0  LCX - 548.4  LAD - 602.7  RCA - 42.2  PDA - 0.0     Total Calcium Score: 1193.3  Percentile: Calcium score is above the 90th  percentile for the patient's age and sex.      Assessment & Plan     1. Hypercholesterolemia with hypertriglyceridemia  rosuvastatin (CRESTOR) 20 MG Tab      2. Elevated coronary artery calcium score            We discussed scheduling exercise treadmill stress test which was ordered during his last visit.  He is active but does not do cardio focused exercise to elicit anginal symptoms.    Discussed ACC/AHA guidelines of cardio focused exercise with reaching at least 85% of your maximum predicted heart rate for a minimum of 150 minutes/week to maintain healthy weight and reduce cardiovascular risk factors for atherosclerotic heart disease.    Stop taking Crestor several months ago.  Had an extensive discussion with him and his wife regarding the importance of taking Crestor given significantly elevated calcium score which is above the 90th percentile.  Last lipid panel is from 2021.  Encouraged to complete repeat lipid panel.      All of patient's excellent questions were answered to the best of my knowledge and to his satisfaction.  It was a pleasure seeing Mr. Edd Beal in my clinic today. Return in about 1 year (around 11/10/2023). Patient is aware to call the cardiology clinic with any questions or concerns.      Bjorn Luis MD  [facility]  [facility], [address]  [address] [address]  Phone: [phone]  Fax: [phone]    Please note that this dictation was created using voice recognition software. I have made every reasonable attempt to correct obvious errors, but it is possible there are errors of grammar and possibly content that I did not discover before finalizing the note.

## 2022-11-17 ENCOUNTER — APPOINTMENT (OUTPATIENT)
Dept: RADIOLOGY | Facility: MEDICAL CENTER | Age: 62
End: 2022-11-17
Attending: EMERGENCY MEDICINE
Payer: COMMERCIAL

## 2022-11-17 ENCOUNTER — HOSPITAL ENCOUNTER (EMERGENCY)
Facility: MEDICAL CENTER | Age: 62
End: 2022-11-17
Attending: EMERGENCY MEDICINE
Payer: COMMERCIAL

## 2022-11-17 VITALS
SYSTOLIC BLOOD PRESSURE: 131 MMHG | OXYGEN SATURATION: 97 % | TEMPERATURE: 97.6 F | HEART RATE: 59 BPM | RESPIRATION RATE: 16 BRPM | WEIGHT: 240.74 LBS | HEIGHT: 69 IN | DIASTOLIC BLOOD PRESSURE: 83 MMHG | BODY MASS INDEX: 35.66 KG/M2
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
## 2022-11-17 DIAGNOSIS — S09.90XA CLOSED HEAD INJURY, INITIAL ENCOUNTER: ICD-10-CM

## 2022-11-17 DIAGNOSIS — S62.101A CLOSED FRACTURE OF RIGHT WRIST, INITIAL ENCOUNTER: ICD-10-CM

## 2022-11-17 PROCEDURE — 73110 X-RAY EXAM OF WRIST: CPT | Mod: RT

## 2022-11-17 PROCEDURE — 700102 HCHG RX REV CODE 250 W/ 637 OVERRIDE(OP): Performed by: EMERGENCY MEDICINE

## 2022-11-17 PROCEDURE — 70450 CT HEAD/BRAIN W/O DYE: CPT

## 2022-11-17 PROCEDURE — 302874 HCHG BANDAGE ACE 2 OR 3""

## 2022-11-17 PROCEDURE — A9270 NON-COVERED ITEM OR SERVICE: HCPCS | Performed by: EMERGENCY MEDICINE

## 2022-11-17 PROCEDURE — 73030 X-RAY EXAM OF SHOULDER: CPT | Mod: RT

## 2022-11-17 PROCEDURE — 99284 EMERGENCY DEPT VISIT MOD MDM: CPT

## 2022-11-17 PROCEDURE — 29125 APPL SHORT ARM SPLINT STATIC: CPT

## 2022-11-17 RX ORDER — HYDROCODONE BITARTRATE AND ACETAMINOPHEN 5; 325 MG/1; MG/1
1 TABLET ORAL EVERY 6 HOURS PRN
Qty: 10 TABLET | Refills: 0 | Status: SHIPPED | OUTPATIENT
Start: 2022-11-17 | End: 2022-11-20

## 2022-11-17 RX ORDER — HYDROCODONE BITARTRATE AND ACETAMINOPHEN 5; 325 MG/1; MG/1
1 TABLET ORAL ONCE
Status: COMPLETED | OUTPATIENT
Start: 2022-11-17 | End: 2022-11-17

## 2022-11-17 RX ADMIN — HYDROCODONE BITARTRATE AND ACETAMINOPHEN 1 TABLET: 5; 325 TABLET ORAL at 15:52

## 2022-11-17 ASSESSMENT — PAIN DESCRIPTION - PAIN TYPE: TYPE: ACUTE PAIN

## 2022-11-17 NOTE — ED PROVIDER NOTES
ED Provider Note    CHIEF COMPLAINT  Chief Complaint   Patient presents with    Fall Less than 10 Feet     60 yo male ambulates to triage with reports of right wrist pain s/p slipping on ice and injuring right wrist.  + deformity noted to wrist.  Reports right shoulder pain as well.  Did hit right shoulder as well and head. No LOC.  Patient is holding wrist in position of comfort with his left hand.         HPI  Edd Beal is a 61 y.o. male who presents complaining of a fall that happened prior to arrival.  The patient states he was walking down his driveway to go to a doctor's appointment when he slipped on some ice and hit the back of his head and landed on his right wrist.  He does not think he lost consciousness but bystanders who saw him fall states he was acting a little confused after the accident.  He does complain of a slight headache and nausea.  He mainly has pain in his right wrist.  He is right-hand dominant.  He has right shoulder pain as well.  He denies any headache or vision changes.  He denies any neck pain.  He has no extremity weakness.  He does not take any blood thinning medications.    REVIEW OF SYSTEMS  No history of poor wound healing diabetes or bony abnormalities     PAST MEDICAL HISTORY  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    Chickenpox     Facial basal cell cancer 2007    lip, no recurrence    GERD (gastroesophageal reflux disease)     GI bleed     High cholesterol     HLD (hyperlipidemia)     Kidney stone     Sleep apnea          SOCIAL HISTORY  Social History     Socioeconomic History    Marital status:     Highest education level: 12th grade   Occupational History    Occupation: Maintenance at [employer]   Tobacco Use    Smoking status: Never    Smokeless tobacco: Never   Vaping Use    Vaping Use: Never used   Substance and Sexual Activity    Alcohol use: Yes      "Comment: Occasionally    Drug use: No    Sexual activity: Not Currently     Social Determinants of Health     Financial Resource Strain: Low Risk     Difficulty of Paying Living Expenses: Not hard at all   Food Insecurity: No Food Insecurity    Worried About Running Out of Food in the Last Year: Never true    Ran Out of Food in the Last Year: Never true   Transportation Needs: No Transportation Needs    Lack of Transportation (Medical): No    Lack of Transportation (Non-Medical): No   Physical Activity: Sufficiently Active    Days of Exercise per Week: 4 days    Minutes of Exercise per Session: 60 min   Stress: No Stress Concern Present    Feeling of Stress : Only a little   Social Connections: Moderately Integrated    Frequency of Communication with Friends and Family: Once a week    Frequency of Social Gatherings with Friends and Family: Once a week    Attends Rastafari Services: More than 4 times per year    Active Member of Clubs or Organizations: Yes    Attends Club or Organization Meetings: More than 4 times per year    Marital Status:    Housing Stability: Low Risk     Unable to Pay for Housing in the Last Year: No    Number of Places Lived in the Last Year: 1    Unstable Housing in the Last Year: No       CURRENT MEDICATIONS  Home Medications       Reviewed by Araceli Hardy R.N. (Registered Nurse) on 11/17/22 at 1511  Med List Status: Not Addressed     Medication Last Dose Status   Ascorbic Acid (VITAMIN C) 1000 MG Tab  Active   aspirin 81 MG EC tablet  Active   Omega-3 Fatty Acids (FISH OIL) 1000 MG Cap capsule  Active   rosuvastatin (CRESTOR) 20 MG Tab  Active   sildenafil citrate (VIAGRA) 100 MG tablet  Active   tamsulosin (FLOMAX) 0.4 MG capsule  Active                    ALLERGIES  No Known Allergies    PHYSICAL EXAM  VITAL SIGNS: /83   Pulse (!) 59   Temp 36.4 °C (97.6 °F) (Temporal)   Resp 16   Ht 1.753 m (5' 9\")   Wt 109 kg (240 lb 11.9 oz)   SpO2 97%   BMI 35.55 kg/m²  "   Constitutional: No distress  HEENT: Normocephalic atraumatic no scalp contusions bony step-offs or crepitance no zazueta signs or raccoon eyes no loose teeth or malocclusion no facial instability  Neck: No C-spine tenderness step-offs or crepitance trachea is midline  Skin: No contusions or abrasions  Musculoskeletal: Positive pain and deformity to the right wrist and tenderness to palpation of the right shoulder with decreased range of motion of both.  Patient has no right AC drop-off or anterior fullness or evidence of shoulder dislocation.  Distally he is neurovascular intact with normal sensation and tendon function.  The rest of his extremities are atraumatic and nontender.  Vascular: warm to touch good capillary refill   Neurologic: distally neurovascularly intact  Psychiatric: Affect normal    Radiology  CT-HEAD W/O   Final Result      1.  Head CT without contrast within normal limits. No evidence of acute cerebral infarction, hemorrhage or mass lesion.         DX-SHOULDER 2+ RIGHT   Final Result      No evidence of fracture or arthropathy.      DX-WRIST-COMPLETE 3+ RIGHT   Final Result      Longitudinally oriented distal radial fracture which extends to the articular surface in the area of the lunate fossa.               Medical Decision Making  Differential diagnosis: Concussion, skull fracture, intracranial hemorrhage, wrist fracture versus sprain, right shoulder fracture    3:52 PM patient was given Norco p.o. for pain and ice was applied to his sore wrist.  X-rays of the wrist and shoulder were ordered as well as a CT of the head due to his loss of consciousness.    4:37 PM patient's right wrist x-ray is positive for a longitudinal distal radius fracture extending into the articular surface.  It is not displaced.  I will place the patient in a volar splint he will referred to [facility] orthopedics Dr. Ferris.  Most likely he will need casting.  I have written him a note for work and he will be  discharged home on pain medications.  I advised him to ice and elevate the wrist is much as possible.  His right shoulder does not show any fractures or dislocations.  His CT head is still pending.      5:03 PM CT head shows ensive skull fracture or intracranial hemorrhage.  I explained to the patient that he has a concussion.  He should rest and avoid heavy exertion.  He can take the Tylenol and Norco as needed for pain or headaches.    I verified that the patient was wearing a mask and I was wearing appropriate PPE every time I entered the room. The patient's mask was on the patient at all times during my encounter except for a brief view of the oropharynx.      I reviewed prescription monitoring program for patient's narcotic use before prescribing a scheduled drug.The patient will not drink alcohol nor drive with prescribed medications. The patient will return for new or worsening symptoms and is stable at the time of discharge.    The patient is referred to a primary physician for blood pressure management, diabetic screening, and for all other preventative health concerns.    In prescribing controlled substances to this patient, I certify that I have obtained and reviewed the medical history of Edd Beal. I have also made a good malinda effort to obtain applicable records from other providers who have treated the patient and records did not demonstrate any increased risk of substance abuse that would prevent me from prescribing controlled substances.     I have conducted a physical exam and documented it. I have reviewed Mr. Beal’s prescription history as maintained by the [place] Prescription Monitoring Program.     I have assessed the patient’s risk for abuse, dependency, and addiction using the validated Opioid Risk Tool available at https://www.mdcalc.com/cyeoxq-rear-saiq-ort-narcotic-abuse.     Given the above, I believe the benefits of controlled substance therapy outweigh the risks. The  reasons for prescribing controlled substances include non-narcotic, oral analgesic alternatives have been inadequate for pain control. Accordingly, I have discussed the risk and benefits, treatment plan, and alternative therapies with the patient.       DISPOSITION:  Patient will be discharged home in stable condition.    FOLLOW UP:  Blas Ferris M.D.  [address]  [address]  [place] [id number] [phone]    Call in 1 day  to establish care, for recheck      OUTPATIENT MEDICATIONS:  New Prescriptions    HYDROCODONE-ACETAMINOPHEN (NORCO) 5-325 MG TAB PER TABLET    Take 1 Tablet by mouth every 6 hours as needed (pain) for up to 3 days.           Diagnosis  1. Fall, initial encounter    2. Closed head injury, initial encounter    3. Closed fracture of right wrist, initial encounter

## 2022-11-17 NOTE — Clinical Note
Edd Beal was seen and treated in our emergency department on 11/17/2022.  He may return to work on 11/23/2022.       If you have any questions or concerns, please don't hesitate to call.      Angela Saxena M.D.

## 2022-11-17 NOTE — ED TRIAGE NOTES
"Chief Complaint   Patient presents with    Fall Less than 10 Feet     62 yo male ambulates to triage with reports of right wrist pain s/p slipping on ice and injuring right wrist.  + deformity noted to wrist.  Reports right shoulder pain as well.  Did hit right shoulder as well and head. No LOC.  Patient is holding wrist in position of comfort with his left hand.      BP (!) 134/90   Pulse 64   Temp 36.4 °C (97.6 °F) (Temporal)   Resp 16   Ht 1.753 m (5' 9\")   Wt 109 kg (240 lb 11.9 oz)   SpO2 94%   BMI 35.55 kg/m²      Patient is Covid Vaccinated   "
none

## 2022-11-30 ENCOUNTER — OFFICE VISIT (OUTPATIENT)
Dept: MEDICAL GROUP | Facility: PHYSICIAN GROUP | Age: 62
End: 2022-11-30
Payer: COMMERCIAL

## 2022-11-30 VITALS
RESPIRATION RATE: 16 BRPM | BODY MASS INDEX: 34.5 KG/M2 | WEIGHT: 241 LBS | HEIGHT: 70 IN | SYSTOLIC BLOOD PRESSURE: 124 MMHG | TEMPERATURE: 97.4 F | HEART RATE: 65 BPM | OXYGEN SATURATION: 94 % | DIASTOLIC BLOOD PRESSURE: 78 MMHG

## 2022-11-30 DIAGNOSIS — Z00.00 PHYSICAL EXAM, ANNUAL: ICD-10-CM

## 2022-11-30 DIAGNOSIS — N40.1 BENIGN PROSTATIC HYPERPLASIA WITH URINARY RETENTION: ICD-10-CM

## 2022-11-30 DIAGNOSIS — R33.8 BENIGN PROSTATIC HYPERPLASIA WITH URINARY RETENTION: ICD-10-CM

## 2022-11-30 PROCEDURE — 99396 PREV VISIT EST AGE 40-64: CPT | Performed by: PHYSICIAN ASSISTANT

## 2022-11-30 NOTE — PROGRESS NOTES
CC:    Chief Complaint   Patient presents with    Follow-Up     PE        HISTORY OF THE PRESENT ILLNESS:  62 y.o. male presenting for annual physical exam.   Pt recently fractured his R wrist a few weeks ago. Managed by [facility].   Pt epigastric pain has resolved.   Has not seen urology for his BPH. Well controlled with flomax.      No problem-specific Assessment & Plan notes found for this encounter.    Allergies: Patient has no known allergies.    Current Outpatient Medications Ordered in Epic   Medication Sig Dispense Refill    coenzyme Q-10 30 MG capsule Take 60 mg by mouth every day.      sildenafil citrate (VIAGRA) 100 MG tablet Take 1 Tablet by mouth 1 time a day as needed.      aspirin 81 MG EC tablet 1 Tablet.      Ascorbic Acid (VITAMIN C) 1000 MG Tab Take 1 Tablet by mouth at bedtime.      Omega-3 Fatty Acids (FISH OIL) 1000 MG Cap capsule Take 1 Capsule by mouth at bedtime.      tamsulosin (FLOMAX) 0.4 MG capsule Take 1 Capsule by mouth at bedtime.      rosuvastatin (CRESTOR) 20 MG Tab Take 1 Tablet by mouth every day. (Patient not taking: Reported on 11/30/2022) 100 Tablet 3     No current Epic-ordered facility-administered medications on file.       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    Chickenpox     Facial basal cell cancer 2007    lip, no recurrence    GERD (gastroesophageal reflux disease)     GI bleed     High cholesterol     HLD (hyperlipidemia)     Kidney stone     Sleep apnea        Past Surgical History:   Procedure Laterality Date    URETHROPLASTY, STAGE 2, USING BUCCAL MUCOSAL GRAFT  10/23/2020    Procedure: URETHROPLASTY,STAGE 2,USING BUCCAL MUCOSAL GRAFTONE-STAGE RECONSTRUCTION OF MALE ANTERIOR URETHRA;  Surgeon: Tai Morales M.D.;  Location: SURGERY [facility];  Service: Urology    CYSTOSCOPY  10/23/2020    Procedure: CYSTOSCOPY WITH LITHOLITHALOPAXY;  Surgeon: Tai Morales M.D.;   Location: SURGERY [facility];  Service: Urology    EYE SURGERY  09/23/2019    Left eye laser surgery    OR CYSTOSCOPY,INSERT URETERAL STENT  6/26/2019    Procedure: CYSTOSCOPY- LASER LITHOTRIPSY  FOR URETHERAL STONE.;  Surgeon: Dion Olivares M.D.;  Location: SURGERY [facility];  Service: Urology    ARTHROPLASTY      right knee    ARTHROSCOPY, KNEE      CARPAL TUNNEL RELEASE      EYE SURGERY      cataracts bilateral, corneal transplant    EYE SURGERY      cataract AND corneal replacement BILATERALLY    HERNIA REPAIR         Social History     Tobacco Use    Smoking status: Never    Smokeless tobacco: Never   Vaping Use    Vaping Use: Never used   Substance Use Topics    Alcohol use: Yes     Comment: Occasionally    Drug use: No       Social History     Social History Narrative    Not on file       Family History   Problem Relation Age of Onset    Cancer Mother 60        colon cancer    Heart Disease Mother         CHF       ROS:     - Constitutional: Negative for fever, chills, unexpected weight change, and fatigue/generalized weakness.     - HEENT: Negative for headaches, vision changes, hearing changes, ear pain, ear discharge, rhinorrhea, sinus congestion, sore throat, and neck pain.      - Respiratory: Negative for cough, sputum production, chest congestion, dyspnea, wheezing, and crackles.      - Cardiovascular: Negative for chest pain, palpitations, orthopnea, and bilateral lower extremity edema.     - Gastrointestinal: Negative for heartburn, nausea, vomiting, abdominal pain, hematochezia, melena, diarrhea, constipation, and greasy/foul-smelling stools.     - Genitourinary: Negative for dysuria, polyuria, hematuria, pyuria, urinary urgency, and urinary incontinence.     - Musculoskeletal: Negative for myalgias, back pain, and joint pain.     - Skin: Negative for rash, itching, cyanotic skin color change.     - Neurological: Negative for dizziness, tingling, tremors, focal sensory deficit, focal  "weakness and headaches.     - Endo/Heme/Allergies: Does not bruise/bleed easily.     - Psychiatric/Behavioral: Negative for depression, suicidal/homicidal ideation and memory loss.            Health Maintenance  Cholesterol Screening: Fasting lipid panel  Diabetes Screening: Fasting blood sugar  Substance Abuse: None  Safe in relationship Yes     Cancer screening  Colorectal Cancer Screenin         Exam: /78   Pulse 65   Temp 36.3 °C (97.4 °F) (Temporal)   Resp 16   Ht 1.778 m (5' 10\")   Wt 109 kg (241 lb)   SpO2 94%  Body mass index is 34.58 kg/m².    General: Normal appearing. No distress.  HEENT: Normocephalic. Eyes conjunctiva clear lids without ptosis, pupils equal and reactive to light accommodation, ears normal shape and contour, canals are clear bilaterally, tympanic membranes are benign, nasal mucosa benign, oropharynx is without erythema, edema or exudates.   Neck: Supple without JVD or bruit. No thyromegaly. No cervical lymphadenopathy  Pulmonary: Clear to ausculation.  Normal effort. No rales, ronchi, or wheezing.  Cardiovascular: Regular rate and rhythm without murmur, gallops or rubs  Neurologic: Grossly nonfocal, gait normal, normal muscle tone  Skin: Warm and dry.  No obvious lesions.  Musculoskeletal: No extremity cyanosis, clubbing, or edema. No deformity  Psych: Normal mood and affect. Alert and oriented x3. Judgment and insight is normal.    Please note that this dictation was created using voice recognition software. I have made every reasonable attempt to correct obvious errors, but I expect that there are errors of grammar and possibly content that I did not discover before finalizing the note.      Assessment/Plan  1. Physical exam, annual  PE conducted.  - CBC WITH DIFFERENTIAL; Future  - Comp Metabolic Panel; Future  - HEMOGLOBIN A1C; Future  - Lipid Profile; Future  - TSH WITH REFLEX TO FT4; Future    2. Benign prostatic hyperplasia with urinary retention  Continue with " flomax.  - PROSTATE SPECIFIC AG DIAGNOSTIC; Future

## 2023-02-24 ENCOUNTER — TELEPHONE (OUTPATIENT)
Dept: SLEEP MEDICINE | Facility: MEDICAL CENTER | Age: 63
End: 2023-02-24

## 2023-02-24 NOTE — TELEPHONE ENCOUNTER
Patient is asking for a new CPAP order to go to [facility].  He is due to come in in August.  Please advise, thank you.

## 2023-03-07 ENCOUNTER — PATIENT MESSAGE (OUTPATIENT)
Dept: SLEEP MEDICINE | Facility: MEDICAL CENTER | Age: 63
End: 2023-03-07
Payer: COMMERCIAL

## 2023-08-17 ENCOUNTER — APPOINTMENT (OUTPATIENT)
Dept: MEDICAL GROUP | Facility: PHYSICIAN GROUP | Age: 63
End: 2023-08-17
Payer: COMMERCIAL

## 2023-10-04 ENCOUNTER — NON-PROVIDER VISIT (OUTPATIENT)
Dept: URGENT CARE | Facility: PHYSICIAN GROUP | Age: 63
End: 2023-10-04

## 2023-10-04 DIAGNOSIS — Z02.1 PRE-EMPLOYMENT DRUG SCREENING: ICD-10-CM

## 2023-10-04 LAB
AMP AMPHETAMINE: NORMAL
COC COCAINE: NORMAL
INT CON NEG: NORMAL
INT CON POS: NORMAL
MET METHAMPHETAMINES: NORMAL
OPI OPIATES: NORMAL
PCP PHENCYCLIDINE: NORMAL
POC DRUG COMMENT 753798-OCCUPATIONAL HEALTH: NORMAL
THC: NORMAL

## 2023-10-04 PROCEDURE — 80305 DRUG TEST PRSMV DIR OPT OBS: CPT | Performed by: NURSE PRACTITIONER

## 2023-10-25 ENCOUNTER — OFFICE VISIT (OUTPATIENT)
Dept: MEDICAL GROUP | Facility: PHYSICIAN GROUP | Age: 63
End: 2023-10-25
Payer: COMMERCIAL

## 2023-10-25 VITALS
SYSTOLIC BLOOD PRESSURE: 120 MMHG | TEMPERATURE: 97.4 F | WEIGHT: 250 LBS | DIASTOLIC BLOOD PRESSURE: 84 MMHG | BODY MASS INDEX: 35.79 KG/M2 | HEART RATE: 72 BPM | HEIGHT: 70 IN | OXYGEN SATURATION: 96 % | RESPIRATION RATE: 14 BRPM

## 2023-10-25 DIAGNOSIS — R53.83 OTHER FATIGUE: ICD-10-CM

## 2023-10-25 PROCEDURE — 99213 OFFICE O/P EST LOW 20 MIN: CPT | Performed by: PHYSICIAN ASSISTANT

## 2023-10-25 PROCEDURE — 3079F DIAST BP 80-89 MM HG: CPT | Performed by: PHYSICIAN ASSISTANT

## 2023-10-25 PROCEDURE — 3074F SYST BP LT 130 MM HG: CPT | Performed by: PHYSICIAN ASSISTANT

## 2023-10-25 RX ORDER — HYDROCODONE BITARTRATE AND ACETAMINOPHEN 5; 325 MG/1; MG/1
TABLET ORAL
COMMUNITY
Start: 2023-09-11 | End: 2023-10-25

## 2023-10-25 RX ORDER — OXYBUTYNIN CHLORIDE 10 MG/1
TABLET, EXTENDED RELEASE ORAL
COMMUNITY
End: 2023-10-25

## 2023-10-25 RX ORDER — SULFAMETHOXAZOLE AND TRIMETHOPRIM 800; 160 MG/1; MG/1
TABLET ORAL
COMMUNITY
End: 2023-10-25

## 2023-10-25 RX ORDER — PANTOPRAZOLE SODIUM 40 MG/1
1 TABLET, DELAYED RELEASE ORAL EVERY EVENING
COMMUNITY
End: 2023-10-25

## 2023-10-25 RX ORDER — AMOXICILLIN 500 MG/1
TABLET, FILM COATED ORAL
COMMUNITY
Start: 2023-09-06 | End: 2023-10-25

## 2023-10-25 RX ORDER — IBUPROFEN 600 MG/1
TABLET ORAL
COMMUNITY
Start: 2023-09-11

## 2023-10-25 ASSESSMENT — ENCOUNTER SYMPTOMS
CHILLS: 0
SPEECH CHANGE: 0
PALPITATIONS: 0
NAUSEA: 0
BRUISES/BLEEDS EASILY: 0
SENSORY CHANGE: 0
INSOMNIA: 0
COUGH: 0
ABDOMINAL PAIN: 0
MYALGIAS: 0
BACK PAIN: 0
DIARRHEA: 0
FOCAL WEAKNESS: 0
FEVER: 1
SHORTNESS OF BREATH: 0
CONSTIPATION: 0
TREMORS: 1
NECK PAIN: 0
DIZZINESS: 0
DEPRESSION: 0
WEIGHT LOSS: 0
HEADACHES: 0

## 2023-10-25 ASSESSMENT — PATIENT HEALTH QUESTIONNAIRE - PHQ9: CLINICAL INTERPRETATION OF PHQ2 SCORE: 0

## 2023-10-25 NOTE — PROGRESS NOTES
CC:    Chief Complaint   Patient presents with    Fatigue     Shaky x3days       HISTORY OF THE PRESENT ILLNESS: Patient is a 62 y.o. male presenting to establish primary care     Pt having new onset fatigue and shaking for past 3 days. No fevers, chills, CP, SOB, GI upset, urinary sxs. States his arms are shaking. He notes that arm shaking has occurred in the past. Eating makes the shaking resolve. Recently started a new job at [employer] but not stressed or depressed. No new onset myalgias, arthralgias, headaches, paresthesias.      No problem-specific Assessment & Plan notes found for this encounter.    Allergies: Patient has no known allergies.    Current Outpatient Medications Ordered in Epic   Medication Sig Dispense Refill    ibuprofen (MOTRIN) 600 MG Tab       Red Yeast Rice Extract (RED YEAST RICE PO) Take  by mouth.      CITRUS BERGAMOT PO Take  by mouth.      coenzyme Q-10 30 MG capsule Take 60 mg by mouth every day.      aspirin 81 MG EC tablet 1 Tablet.      Ascorbic Acid (VITAMIN C) 1000 MG Tab Take 1 Tablet by mouth at bedtime.      Omega-3 Fatty Acids (FISH OIL) 1000 MG Cap capsule Take 1 Capsule by mouth at bedtime.      tamsulosin (FLOMAX) 0.4 MG capsule Take 1 Capsule by mouth at bedtime.       No current Epic-ordered facility-administered medications on file.       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    Chickenpox     Facial basal cell cancer 2007    lip, no recurrence    GERD (gastroesophageal reflux disease)     GI bleed     High cholesterol     HLD (hyperlipidemia)     Kidney stone     Sleep apnea        Past Surgical History:   Procedure Laterality Date    URETHROPLASTY, STAGE 2, USING BUCCAL MUCOSAL GRAFT  10/23/2020    Procedure: URETHROPLASTY,STAGE 2,USING BUCCAL MUCOSAL GRAFTONE-STAGE RECONSTRUCTION OF MALE ANTERIOR URETHRA;  Surgeon: Tai Morales M.D.;  Location: SURGERY [facility];   Service: Urology    CYSTOSCOPY  10/23/2020    Procedure: CYSTOSCOPY WITH LITHOLITHALOPAXY;  Surgeon: Tai Morales M.D.;  Location: SURGERY [facility];  Service: Urology    EYE SURGERY  09/23/2019    Left eye laser surgery    TN CYSTOSCOPY,INSERT URETERAL STENT  6/26/2019    Procedure: CYSTOSCOPY- LASER LITHOTRIPSY  FOR URETHERAL STONE.;  Surgeon: Dion Olivares M.D.;  Location: SURGERY [facility];  Service: Urology    ARTHROPLASTY      right knee    ARTHROSCOPY, KNEE      CARPAL TUNNEL RELEASE      EYE SURGERY      cataracts bilateral, corneal transplant    EYE SURGERY      cataract AND corneal replacement BILATERALLY    HERNIA REPAIR         Social History     Tobacco Use    Smoking status: Never    Smokeless tobacco: Never   Vaping Use    Vaping Use: Never used   Substance Use Topics    Alcohol use: Yes     Comment: Occasionally    Drug use: No       Social History     Social History Narrative    Not on file       Family History   Problem Relation Age of Onset    Cancer Mother 60        colon cancer    Heart Disease Mother         CHF       ROS:     - Constitutional:*** Negative for fever, chills, unexpected weight change, and fatigue/generalized weakness.     - HEENT***: Negative for headaches, vision changes, hearing changes, ear pain, ear discharge, rhinorrhea, sinus congestion, sore throat, and neck pain.      - Respiratory:*** Negative for cough, sputum production, chest congestion, dyspnea, wheezing, and crackles.      - Cardiovascular:*** Negative for chest pain, palpitations, orthopnea, and bilateral lower extremity edema.     - Gastrointestinal:*** Negative for heartburn, nausea, vomiting, abdominal pain, hematochezia, melena, diarrhea, constipation, and greasy/foul-smelling stools.     - Genitourinary:*** Negative for dysuria, polyuria, hematuria, pyuria, urinary urgency, and urinary incontinence.     - Musculoskeletal:*** Negative for myalgias, back pain, and joint pain.     - Skin:***  "Negative for rash, itching, cyanotic skin color change.     - Neurological:*** Negative for dizziness, tingling, tremors, focal sensory deficit, focal weakness and headaches.     - Endo/Heme/Allergies:*** Does not bruise/bleed easily.     - Psychiatric/Behavioral: ***Negative for depression, suicidal/homicidal ideation and memory loss.      {ROSALLOTHERSNE}      .    ***  Exam: /84   Pulse 72   Temp 36.3 °C (97.4 °F) (Temporal)   Resp 14   Ht 1.778 m (5' 10\")   Wt 113 kg (250 lb)   SpO2 96%  Body mass index is 35.87 kg/m².    General: Normal appearing. No acute distress.  Skin: Warm and dry.  No obvious lesions.  HEENT: Normocephalic. Eyes conjunctiva clear lids without ptosis, ears normal shape and contour  Cardiovascular: Regular rate and rhythm without murmur.   Respiratory: Clear to auscultation bilaterally, no rhonchi wheezing or rales.  Neurologic: Grossly nonfocal, A&O x3, gait normal,  Musculoskeletal: No deformity or swelling.   Extremities: No extremity cyanosis, clubbing, or edema.  Psych: Normal mood and affect. Judgment and insight is normal.  ***  Please note that this dictation was created using voice recognition software. I have made every reasonable attempt to correct obvious errors, but I expect that there are errors of grammar and possibly content that I did not discover before finalizing the note.      Assessment/Plan      "

## 2023-10-25 NOTE — PROGRESS NOTES
CC:  Chief Complaint   Patient presents with    Fatigue     Shaky x3days       HISTORY OF PRESENT ILLNESS: Patient is a 62 y.o. male established patient presenting with issues below  Pt having new onset fatigue and shaking for past 3 days. No fevers, chills, CP, SOB, GI upset, urinary sxs. States his arms are shaking. He notes that arm shaking has occurred in the past. Eating makes the shaking resolve. Recently started a new job at [employer] but not stressed or depressed. No new onset myalgias, arthralgias, headaches, paresthesias.      Current Outpatient Medications   Medication Sig Dispense Refill    ibuprofen (MOTRIN) 600 MG Tab       Red Yeast Rice Extract (RED YEAST RICE PO) Take  by mouth.      CITRUS BERGAMOT PO Take  by mouth.      coenzyme Q-10 30 MG capsule Take 60 mg by mouth every day.      aspirin 81 MG EC tablet 1 Tablet.      Ascorbic Acid (VITAMIN C) 1000 MG Tab Take 1 Tablet by mouth at bedtime.      Omega-3 Fatty Acids (FISH OIL) 1000 MG Cap capsule Take 1 Capsule by mouth at bedtime.      tamsulosin (FLOMAX) 0.4 MG capsule Take 1 Capsule by mouth at bedtime.       No current facility-administered medications for this visit.        ROS:     Review of Systems   Constitutional:  Positive for fever. Negative for chills, malaise/fatigue and weight loss.   Respiratory:  Negative for cough and shortness of breath.    Cardiovascular:  Negative for chest pain, palpitations and leg swelling.   Gastrointestinal:  Negative for abdominal pain, constipation, diarrhea and nausea.   Genitourinary:  Negative for dysuria, frequency, hematuria and urgency.   Musculoskeletal:  Negative for back pain, joint pain, myalgias and neck pain.   Neurological:  Positive for tremors. Negative for dizziness, sensory change, speech change, focal weakness and headaches.   Endo/Heme/Allergies:  Does not bruise/bleed easily.   Psychiatric/Behavioral:  Negative for depression. The patient does not have insomnia.        Exam:    BP  "120/84   Pulse 72   Temp 36.3 °C (97.4 °F) (Temporal)   Resp 14   Ht 1.778 m (5' 10\")   Wt 113 kg (250 lb)   SpO2 96%  Body mass index is 35.87 kg/m².    General:  Well nourished, well developed male in NAD  Head is grossly normal.  Neck: Supple.   Pulmonary: Clear to auscultation. No ronchi, wheezing or rales  Cardiac: Regular rate and rhythm. No murmurs.  Skin: Warm and dry. No obvious lesions  Neuro: Normal muscle tone. Gait normal. Alert and oriented.  Psych: Normal mood and affect      Please note that this dictation was created using voice recognition software. I have made every reasonable attempt to correct obvious errors, but I expect that there are errors of grammar and possibly content that I did not discover before finalizing the note.        Assessment/Plan:    1. Other fatigue  Unclear etiology.  Previous lab orders that were in our system printed out for him to do today.  Follow-up with results when available.           "

## 2023-10-25 NOTE — LETTER
October 25, 2023         Patient: Edd Beal   YOB: 1960   Date of Visit: 10/25/2023           To Whom it May Concern:    Edd Beal was seen in my clinic on 10/25/2023. Please excuse him from work until 10/26/2023.     If you have any questions or concerns, please don't hesitate to call.        Sincerely,           Guillermo Duvall P.A.-C.  Electronically Signed

## 2023-12-31 ENCOUNTER — HOSPITAL ENCOUNTER (OUTPATIENT)
Facility: MEDICAL CENTER | Age: 63
End: 2023-12-31
Attending: NURSE PRACTITIONER
Payer: COMMERCIAL

## 2023-12-31 ENCOUNTER — OFFICE VISIT (OUTPATIENT)
Dept: URGENT CARE | Facility: PHYSICIAN GROUP | Age: 63
End: 2023-12-31
Payer: COMMERCIAL

## 2023-12-31 VITALS
SYSTOLIC BLOOD PRESSURE: 132 MMHG | HEIGHT: 70 IN | DIASTOLIC BLOOD PRESSURE: 74 MMHG | HEART RATE: 85 BPM | OXYGEN SATURATION: 99 % | TEMPERATURE: 97.6 F | RESPIRATION RATE: 16 BRPM | BODY MASS INDEX: 36.22 KG/M2 | WEIGHT: 253 LBS

## 2023-12-31 DIAGNOSIS — J06.9 UPPER RESPIRATORY INFECTION WITH COUGH AND CONGESTION: ICD-10-CM

## 2023-12-31 DIAGNOSIS — N39.0 ACUTE URINARY TRACT INFECTION: ICD-10-CM

## 2023-12-31 PROBLEM — Z87.442 HISTORY OF URINARY STONE: Status: ACTIVE | Noted: 2019-06-18

## 2023-12-31 PROBLEM — N40.0 ENLARGED PROSTATE: Status: ACTIVE | Noted: 2019-06-18

## 2023-12-31 PROCEDURE — 99214 OFFICE O/P EST MOD 30 MIN: CPT | Performed by: NURSE PRACTITIONER

## 2023-12-31 PROCEDURE — 3078F DIAST BP <80 MM HG: CPT | Performed by: NURSE PRACTITIONER

## 2023-12-31 PROCEDURE — 3075F SYST BP GE 130 - 139MM HG: CPT | Performed by: NURSE PRACTITIONER

## 2023-12-31 PROCEDURE — 87086 URINE CULTURE/COLONY COUNT: CPT

## 2023-12-31 RX ORDER — NITROFURANTOIN 25; 75 MG/1; MG/1
100 CAPSULE ORAL 2 TIMES DAILY
Qty: 14 CAPSULE | Refills: 0 | Status: SHIPPED | OUTPATIENT
Start: 2023-12-31 | End: 2024-01-07

## 2023-12-31 RX ORDER — BENZONATATE 100 MG/1
100 CAPSULE ORAL 3 TIMES DAILY PRN
Qty: 60 CAPSULE | Refills: 0 | Status: SHIPPED | OUTPATIENT
Start: 2023-12-31

## 2023-12-31 RX ORDER — ROSUVASTATIN CALCIUM 20 MG/1
1 TABLET, COATED ORAL DAILY
COMMUNITY
End: 2024-01-11 | Stop reason: SDUPTHER

## 2023-12-31 RX ORDER — SILDENAFIL 100 MG/1
TABLET, FILM COATED ORAL
COMMUNITY
Start: 2023-12-19

## 2023-12-31 ASSESSMENT — FIBROSIS 4 INDEX: FIB4 SCORE: 2.19

## 2023-12-31 NOTE — PROGRESS NOTES
Chief Complaint   Patient presents with    Cough     X 4 days with congestion.     UTI     X 5 day with burning with urination, urgency and frequency.        HISTORY OF PRESENT ILLNESS: Patient is a pleasant 63 y.o. male who presents today due to symptoms which started five days ago. Pt reports a cough, nasal congestion, sinus pressure and congestion. Denies chest pain, shortness of breath, fever, ear pain, or wheezing. Denies h/o asthma/copd/CAP. No immunocompromise. Has tried OTC cold medications without significant relief of symptoms. No recent ABX use. No other aggravating or alleviating factors.       In addition, patient notes 5 days of dysuria, urgency, frequency.  He has a history of BPH, urinary tract infections, and kidney stones.  States his symptoms feel like his previous urinary tract infections.  Denies any testicular pain or swelling, flank pain, fever, vomiting.  Denies any recent infections.  He has a urologist.      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    Current Outpatient Medications Ordered in Epic   Medication Sig Dispense Refill    sildenafil citrate (VIAGRA) 100 MG tablet TAKE 1 TABLET BY MOUTH DAILY AS NEEDED      nitrofurantoin (MACROBID) 100 MG Cap Take 1 Capsule by mouth 2 times a day for 7 days. 14 Capsule 0    benzonatate  (TESSALON) 100 MG Cap Take 1 Capsule by mouth 3 times a day as needed for Cough. 60 Capsule 0    ibuprofen (MOTRIN) 600 MG Tab       Red Yeast Rice Extract (RED YEAST RICE PO) Take  by mouth.      CITRUS BERGAMOT PO Take  by mouth.      coenzyme Q-10 30 MG capsule Take 60 mg by mouth every day.      aspirin 81 MG EC tablet 1 Tablet.      Ascorbic Acid (VITAMIN C) 1000 MG Tab Take 1 Tablet by mouth at bedtime.      Omega-3 Fatty Acids (FISH OIL) 1000 MG Cap capsule Take 1 Capsule by mouth at bedtime.      tamsulosin (FLOMAX) 0.4 MG capsule Take 1 Capsule by mouth at bedtime.      rosuvastatin (CRESTOR) 20 MG Tab Take 1 Tablet by mouth every day.       No current [facility]-ordered facility-administered medications on file.       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    Chickenpox     Facial basal cell cancer 2007    lip, no recurrence    GERD (gastroesophageal reflux disease)     GI bleed     High cholesterol     HLD (hyperlipidemia)     Kidney stone     Sleep apnea        Social History     Tobacco Use    Smoking status: Never    Smokeless tobacco: Never   Vaping Use    Vaping Use: Never used   Substance Use Topics    Alcohol use: Yes     Comment: Occasionally    Drug use: No       Family Status   Relation Name Status    Mo      Fa      Sis  Alive    Bro  Alive    Sis  Alive     Family History   Problem Relation Age of Onset    Cancer Mother 60        colon cancer    Heart Disease Mother         CHF       ROS:  Review of Systems   Constitutional: Positive for subjective fever, chills, fatigue, malaise. Negative for weight loss.  HENT: Positive for congestion, ear pressure, and sore throat. Negative for ear pain, nosebleeds, and neck pain.    Eyes: Negative for vision changes.   Cardiovascular: Negative for chest pain, palpitations, orthopnea and leg swelling.   Respiratory: Positive for cough and sputum  "production. Negative for shortness of breath and wheezing.   Gastrointestinal: Negative for abdominal pain, nausea, vomiting or diarrhea.   : Positive for dysuria, urgency, frequency.  Negative for flank pain, testicular pain or swelling.  Skin: Negative for rash, diaphoresis.     Exam:  /74   Pulse 85   Temp 36.4 °C (97.6 °F) (Temporal)   Resp 16   Ht 1.778 m (5' 10\")   Wt 115 kg (253 lb)   SpO2 99%   General: well-nourished, well-developed male in NAD  Head: normocephalic, atraumatic  Eyes: PERRLA, EOM within normal limits, no conjunctival injection, no scleral icterus, visual fields and acuity grossly intact.  Ears: normal shape and symmetry, no tenderness, no discharge. External canals are without any significant edema or erythema. Tympanic membranes are without any inflammation, no effusion. Gross auditory acuity is intact.  Nose: symmetrical without tenderness, mild discharge, erythema present bilateral nares.  Mouth/Throat: reasonable hygiene, no exudates or tonsillar enlargement. Mild erythema present.   Neck: no masses, range of motion within normal limits, no tracheal deviation.  Lymph: mild cervical adenopathy. No supraclavicular adenopathy.   Neuro: alert and oriented. Cranial nerves 1-12 grossly intact.   Cardiovascular: regular rate and rhythm without murmurs, rubs, or gallops. No edema.   Pulmonary: no distress. Chest is symmetrical with respiration. No wheezes, crackles, or rhonchi.   Musculoskeletal: appropriate muscle tone, gait is stable.  GI: Soft, nontender.  No CVA tenderness.  Skin: warm, dry, intact, no clubbing, no cyanosis.   Psych: appropriate mood, affect, judgement.       POC urine positive for protein and leukocytes      Assessment/Plan:  1. Upper respiratory infection with cough and congestion  benzonatate (TESSALON) 100 MG Cap      2. Acute urinary tract infection  nitrofurantoin (MACROBID) 100 MG Cap    URINE CULTURE(NEW)              Discussed URI symptoms symptoms " most likely viral, self limiting illness. Did not see any evidence of a bacterial process. Discussed natural progression and sx care.  Rest, increase fluids, hand and respiratory hygiene.  Tessalon Perles for cough.  Regarding UTI symptoms, urine sent for culture, will be placed on Macrobid.  Instructed follow-up with his urologist.  Supportive care, differential diagnoses, and indications for immediate follow-up discussed with patient.   Pathogenesis of diagnosis discussed including typical length and natural progression.  Instructed to return to clinic or nearest emergency department for any change in condition, further concerns, or worsening of symptoms.  Patient states understanding of the plan of care and discharge instructions.  Instructed to make an appointment with his primary care provider in the next 3-5 days if not significantly improving and for further care.         Please note that this dictation was created using voice recognition software. I have made every reasonable attempt to correct obvious errors, but I expect that there are errors of grammar and possibly content that I did not discover before finalizing the note.      CHESTER Barahona.

## 2024-01-01 DIAGNOSIS — N39.0 ACUTE URINARY TRACT INFECTION: ICD-10-CM

## 2024-01-03 LAB
BACTERIA UR CULT: NORMAL
SIGNIFICANT IND 70042: NORMAL
SITE SITE: NORMAL
SOURCE SOURCE: NORMAL

## 2024-01-11 ENCOUNTER — OFFICE VISIT (OUTPATIENT)
Dept: CARDIOLOGY | Facility: MEDICAL CENTER | Age: 64
End: 2024-01-11
Attending: INTERNAL MEDICINE
Payer: COMMERCIAL

## 2024-01-11 VITALS
HEIGHT: 70 IN | OXYGEN SATURATION: 94 % | DIASTOLIC BLOOD PRESSURE: 68 MMHG | SYSTOLIC BLOOD PRESSURE: 100 MMHG | HEART RATE: 70 BPM | WEIGHT: 248.3 LBS | BODY MASS INDEX: 35.55 KG/M2

## 2024-01-11 DIAGNOSIS — I25.10 CORONARY ARTERIOSCLEROSIS: ICD-10-CM

## 2024-01-11 DIAGNOSIS — G47.33 OBSTRUCTIVE SLEEP APNEA SYNDROME: Chronic | ICD-10-CM

## 2024-01-11 DIAGNOSIS — Z87.11 HISTORY OF GASTRIC ULCER: ICD-10-CM

## 2024-01-11 PROCEDURE — 99214 OFFICE O/P EST MOD 30 MIN: CPT | Performed by: INTERNAL MEDICINE

## 2024-01-11 PROCEDURE — 99212 OFFICE O/P EST SF 10 MIN: CPT | Performed by: INTERNAL MEDICINE

## 2024-01-11 PROCEDURE — 3078F DIAST BP <80 MM HG: CPT | Performed by: INTERNAL MEDICINE

## 2024-01-11 PROCEDURE — 3074F SYST BP LT 130 MM HG: CPT | Performed by: INTERNAL MEDICINE

## 2024-01-11 RX ORDER — ROSUVASTATIN CALCIUM 20 MG/1
20 TABLET, COATED ORAL DAILY
Qty: 100 TABLET | Refills: 3 | Status: SHIPPED | OUTPATIENT
Start: 2024-01-11

## 2024-01-11 ASSESSMENT — FIBROSIS 4 INDEX: FIB4 SCORE: 2.19

## 2024-01-11 NOTE — PROGRESS NOTES
"CARDIOLOGY OUTPATIENT FOLLOWUP    PCP: Guillermo Duvall P.A.-C.    1. Coronary arteriosclerosis    2. Obstructive sleep apnea syndrome    3. History of gastric ulcer        Edd Beal is clinically well and with high coronary artery calcification I recommended replacing red yeast rice with Crestor.  We also talked about healthy dietary practices-particularly minimizing carb related.  He will measure laboratory panel in 6 weeks time.    Follow up: 1 year    History: Edd Beal is a 63 y.o. male with history of coronary arterial sclerosis, strong family history of early coronary artery disease presenting for follow-up.  His wife Kate is also a patient of mine.  Since his last evaluation in our office he had stopped rosuvastatin and begin red yeast rice.  He feels well.  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  He has lost a lot of weight since beginning this strenuous job.      Physical Exam:  /68 (BP Location: Left arm, Patient Position: Sitting, BP Cuff Size: Adult)   Pulse 70   Ht 1.778 m (5' 10\")   Wt 113 kg (248 lb 4.8 oz)   SpO2 94%   BMI 35.63 kg/m²   GEN: NAD  RESP: CTAB  CVS: RRR, No M/R/G  ABD: Soft, NT/ND  EXT: WWP, no edema    () Today's E/M visit is associated with medical care services that serve as the continuing focal point for all needed health care services and/or with medical care services that  are part of ongoing care related to a patient's single, serious condition, or a complex condition: This includes  furnishing services to patients on an ongoing basis that result in care that is personalized  to the patient. The services result in a comprehensive, longitudinal, and continuous  relationship with the patient and involve delivery of team-based care that is accessible, coordinated with other practitioners and providers, and integrated with the " broader health  care landscape.      The ASCVD Risk score (Josette MELO, et al., 2019) failed to calculate.    Studies  Lab Results   Component Value Date/Time    CHOLSTRLTOT 108 10/18/2021 07:14 AM    LDL 46 10/18/2021 07:14 AM    HDL 27 (A) 10/18/2021 07:14 AM    TRIGLYCERIDE 175 (H) 10/18/2021 07:14 AM       Lab Results   Component Value Date/Time    SODIUM 140 10/25/2023 05:18 PM    SODIUM 142 10/18/2021 07:14 AM    POTASSIUM 3.8 10/25/2023 05:18 PM    POTASSIUM 4.2 10/18/2021 07:14 AM    CHLORIDE 108 10/25/2023 05:18 PM    CHLORIDE 108 10/18/2021 07:14 AM    CO2 25 10/25/2023 05:18 PM    CO2 23 10/18/2021 07:14 AM    GLUCOSE 90 10/25/2023 05:18 PM    GLUCOSE 121 (H) 10/18/2021 07:14 AM    BUN 21 (H) 10/25/2023 05:18 PM    BUN 13 10/18/2021 07:14 AM    CREATININE 0.96 10/25/2023 05:18 PM    CREATININE 0.96 10/18/2021 07:14 AM    GLOMRATE 79 10/25/2023 05:18 PM     Lab Results   Component Value Date/Time    ALKPHOSPHAT 78 10/25/2023 05:18 PM    ALKPHOSPHAT 94 10/18/2021 07:14 AM    ASTSGOT 40 10/25/2023 05:18 PM    ASTSGOT 30 10/18/2021 07:14 AM    ALTSGPT 37 10/25/2023 05:18 PM    ALTSGPT 41 10/18/2021 07:14 AM    TBILIRUBIN 1.1 10/25/2023 05:18 PM    TBILIRUBIN 1.0 10/18/2021 07:14 AM      @CBC@    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    Chickenpox     Facial basal cell cancer 2007    lip, no recurrence    GERD (gastroesophageal reflux disease)     GI bleed     High cholesterol     HLD (hyperlipidemia)     Kidney stone     Sleep apnea      No Known Allergies  Outpatient Encounter Medications as of 1/11/2024   Medication Sig Dispense Refill    rosuvastatin (CRESTOR) 20 MG Tab Take 1 Tablet by mouth every day. 100 Tablet 3    sildenafil citrate (VIAGRA) 100 MG tablet TAKE 1 TABLET BY MOUTH DAILY AS NEEDED      benzonatate (TESSALON) 100 MG Cap Take 1 Capsule by mouth 3 times a day as needed for Cough. 60 Capsule 0     ibuprofen (MOTRIN) 600 MG Tab       CITRUS BERGAMOT PO Take  by mouth.      coenzyme Q-10 30 MG capsule Take 60 mg by mouth every day.      aspirin 81 MG EC tablet 1 Tablet.      Ascorbic Acid (VITAMIN C) 1000 MG Tab Take 1 Tablet by mouth at bedtime.      tamsulosin (FLOMAX) 0.4 MG capsule Take 1 Capsule by mouth at bedtime.      [DISCONTINUED] rosuvastatin (CRESTOR) 20 MG Tab Take 1 Tablet by mouth every day.      [DISCONTINUED] Red Yeast Rice Extract (RED YEAST RICE PO) Take  by mouth.      [DISCONTINUED] Omega-3 Fatty Acids (FISH OIL) 1000 MG Cap capsule Take 1 Capsule by mouth at bedtime.       No facility-administered encounter medications on file as of 1/11/2024.     Social History     Socioeconomic History    Marital status:      Spouse name: Not on file    Number of children: Not on file    Years of education: Not on file    Highest education level: 12th grade   Occupational History    Occupation: Maintenance at [employer]   Tobacco Use    Smoking status: Never    Smokeless tobacco: Never   Vaping Use    Vaping Use: Never used   Substance and Sexual Activity    Alcohol use: Yes     Comment: Occasionally    Drug use: No    Sexual activity: Not Currently   Other Topics Concern    Not on file   Social History Narrative    Not on file     Social Determinants of Health     Financial Resource Strain: Low Risk  (5/25/2022)    Overall Financial Resource Strain (CARDIA)     Difficulty of Paying Living Expenses: Not hard at all   Food Insecurity: No Food Insecurity (5/25/2022)    Hunger Vital Sign     Worried About Running Out of Food in the Last Year: Never true     Ran Out of Food in the Last Year: Never true   Transportation Needs: No Transportation Needs (5/25/2022)    PRAPARE - Transportation     Lack of Transportation (Medical): No     Lack of Transportation (Non-Medical): No   Physical Activity: Sufficiently Active (5/25/2022)    Exercise Vital Sign     Days of Exercise per Week: 4 days     Minutes of  Exercise per Session: 60 min   Stress: No Stress Concern Present (5/25/2022)    [place] [employer] - Occupational Stress Questionnaire     Feeling of Stress : Only a little   Social Connections: Moderately Integrated (5/25/2022)    Social Connection and Isolation Panel [NHANES]     Frequency of Communication with Friends and Family: Once a week     Frequency of Social Gatherings with Friends and Family: Once a week     Attends [employer] Services: More than 4 times per year     Active Member of Clubs or Organizations: Yes     Attends Club or Organization Meetings: More than 4 times per year     Marital Status:    Intimate Partner Violence: Not on file   Housing Stability: Low Risk  (5/25/2022)    Housing Stability Vital Sign     Unable to Pay for Housing in the Last Year: No     Number of Places Lived in the Last Year: 1     Unstable Housing in the Last Year: No         ROS:   10 point review systems is otherwise negative except as per the HPI    Chief Complaint   Patient presents with    Hyperlipidemia

## 2024-01-29 ENCOUNTER — HOSPITAL ENCOUNTER (OUTPATIENT)
Facility: MEDICAL CENTER | Age: 64
End: 2024-01-29
Attending: PHYSICIAN ASSISTANT
Payer: COMMERCIAL

## 2024-01-29 PROCEDURE — 87086 URINE CULTURE/COLONY COUNT: CPT

## 2024-01-31 LAB
BACTERIA UR CULT: NORMAL
SIGNIFICANT IND 70042: NORMAL
SITE SITE: NORMAL
SOURCE SOURCE: NORMAL

## 2024-02-19 NOTE — ED PROVIDER NOTES
ED Provider Note        History obtained from: Patient    CHIEF COMPLAINT  Chief Complaint   Patient presents with   • Burn     left hand burn from hot oil.  6/4/2020 morning       HPI  Edd Beal is a 59 y.o. male who presents for wound evaluation.  Patient reports that he burned himself 2 days ago removing brennan from an oven.  He reports that it subsequently blistered and spontaneously ruptured.  He has been keeping it clean with soap and water.  He had initially washed it under water.  Denies any fevers, chills, spreading redness.  He reports that it is painful.    REVIEW OF SYSTEMS    CONSTITUTIONAL:  No fever.  CARDIOVASCULAR:  No chest discomfort.  RESPIRATORY:  No pleuritic chest pain.  GASTROINTESTINAL:  No abdominal pain.    See HPI for further details.       PAST MEDICAL HISTORY  Past Medical History:   Diagnosis Date   • BPH (benign prostatic hyperplasia)    • Bronchitis    • Chickenpox    • Facial basal cell cancer 2007    lip, no recurrence   • GERD (gastroesophageal reflux disease)    • GI bleed    • HLD (hyperlipidemia)    • Kidney stone    • Sleep apnea        FAMILY HISTORY  Family History   Problem Relation Age of Onset   • Cancer Mother 60        colon cancer   • Heart Disease Mother         CHF       SOCIAL HISTORY   reports that he has never smoked. He has never used smokeless tobacco. He reports current alcohol use. He reports that he does not use drugs.    SURGICAL HISTORY  Past Surgical History:   Procedure Laterality Date   • EYE SURGERY  09/23/2019    Left eye laser surgery   • PB CYSTOSCOPY,INSERT URETERAL STENT  6/26/2019    Procedure: CYSTOSCOPY- LASER LITHOTRIPSY  FOR URETHERAL STONE.;  Surgeon: Dion Olivares M.D.;  Location: SURGERY [facility];  Service: Urology   • ARTHROPLASTY      right knee   • ARTHROSCOPY, KNEE     • CARPAL TUNNEL RELEASE     • EYE SURGERY      cataracts bilateral, corneal transplant   • EYE SURGERY      cataract AND corneal replacement  "BILATERALLY   • HERNIA REPAIR         CURRENT MEDICATIONS  Home Medications    **Home medications have not yet been reviewed for this encounter**         ALLERGIES  No Known Allergies    PHYSICAL EXAM  VITAL SIGNS: /77   Pulse 72   Temp 36.1 °C (96.9 °F) (Temporal)   Resp 17   Ht 1.778 m (5' 10\")   Wt 121.1 kg (266 lb 15.6 oz)   SpO2 93%   BMI 38.31 kg/m²    Gen: alert, no acute distress  HENT: ATNC  Eyes: normal conjuctiva  Resp: No resipiratory distress.   CV: No JVD   Abd: Non-distended  Extremities: No deformity  Skin: Partial-thickness burns over volar aspect of proximal wrist that does not cross the wrist joint.  Less than 1% total body surface area.  There is flaccid ruptured blister with approximately 1.5 x 1.5 cm open area to granulation tissue.  No surrounding erythema.  Distal CSM intact.  No discharge.      COURSE & MEDICAL DECISION MAKING  Pertinent Labs & Imaging studies reviewed. (See chart for details)    Patient presents with partial thickness burn that appears to be healing as normal.  No evidence of infection.  He was given return precautions, wound care instructions, and will plan for discharge home.  He is advised to follow with his regular doctor.    Appropriate PPE were worn during this encounter.    FINAL IMPRESSION  1. Burn         DISPOSITION:  Patient will be discharged home in stable condition.    FOLLOW UP:  Linn Thornton M.D.  [address] [place] [id number]  [phone]    Schedule an appointment as soon as possible for a visit         OUTPATIENT MEDICATIONS:  Discharge Medication List as of 6/5/2020 12:17 AM          " No

## 2024-03-17 ENCOUNTER — HOSPITAL ENCOUNTER (OUTPATIENT)
Dept: RADIOLOGY | Facility: MEDICAL CENTER | Age: 64
End: 2024-03-17
Attending: PHYSICIAN ASSISTANT
Payer: COMMERCIAL

## 2024-03-17 DIAGNOSIS — Z87.442 PERSONAL HISTORY OF URINARY CALCULI: ICD-10-CM

## 2024-03-17 PROCEDURE — 76775 US EXAM ABDO BACK WALL LIM: CPT

## 2024-03-21 ENCOUNTER — HOSPITAL ENCOUNTER (OUTPATIENT)
Facility: MEDICAL CENTER | Age: 64
End: 2024-03-21
Attending: STUDENT IN AN ORGANIZED HEALTH CARE EDUCATION/TRAINING PROGRAM
Payer: COMMERCIAL

## 2024-03-21 PROCEDURE — 87086 URINE CULTURE/COLONY COUNT: CPT

## 2024-03-21 PROCEDURE — 87077 CULTURE AEROBIC IDENTIFY: CPT

## 2024-04-22 ENCOUNTER — APPOINTMENT (OUTPATIENT)
Dept: MEDICAL GROUP | Facility: PHYSICIAN GROUP | Age: 64
End: 2024-04-22
Payer: COMMERCIAL

## 2024-04-25 DIAGNOSIS — I25.10 CORONARY ARTERIOSCLEROSIS: ICD-10-CM

## 2024-04-25 NOTE — TELEPHONE ENCOUNTER
BE    Received request via: Patient    Was the patient seen in the last year in this department? Yes    Does the patient have an active prescription (recently filled or refills available) for medication(s) requested?  Pt needs to change his Pharmacy and request to have a 90 day script place for insurance reasons    Pharmacy Name: [facility]  Please update this to be his main pharmacy    Does the patient have jail Plus and need 100 day supply (blood pressure, diabetes and cholesterol meds only)? No scp     Thank You  Nikki CUELLAR

## 2024-04-26 RX ORDER — ROSUVASTATIN CALCIUM 20 MG/1
20 TABLET, COATED ORAL DAILY
Qty: 100 TABLET | Refills: 3 | OUTPATIENT
Start: 2024-04-26

## 2024-04-26 NOTE — TELEPHONE ENCOUNTER
Denied refill request due to active rx at requested pharmacy, possible duplication, and/or patient needs appointment for further refills and or discontinuation.

## 2024-04-29 ENCOUNTER — TELEPHONE (OUTPATIENT)
Dept: CARDIOLOGY | Facility: MEDICAL CENTER | Age: 64
End: 2024-04-29
Payer: COMMERCIAL

## 2024-04-29 DIAGNOSIS — I25.10 CORONARY ARTERIOSCLEROSIS: ICD-10-CM

## 2024-04-29 RX ORDER — ROSUVASTATIN CALCIUM 20 MG/1
20 TABLET, COATED ORAL DAILY
Qty: 90 TABLET | Refills: 3 | Status: SHIPPED | OUTPATIENT
Start: 2024-04-29

## 2024-04-29 NOTE — TELEPHONE ENCOUNTER
BE    Caller: Kate Emigdio - spouse    Topic/issue: Patients wife called to have the patients   rosuvastatin (CRESTOR) 20 MG Tab  sent to [facility] in the future as a 90 day supply instead of [facility]. Patient does not need a refill at this time.     Please advise.     Callback Number: [phone]    Thank you,     Soraya MEJIA

## 2024-08-21 ENCOUNTER — APPOINTMENT (OUTPATIENT)
Dept: RADIOLOGY | Facility: MEDICAL CENTER | Age: 64
End: 2024-08-21
Payer: COMMERCIAL

## 2024-08-21 ENCOUNTER — HOSPITAL ENCOUNTER (EMERGENCY)
Facility: MEDICAL CENTER | Age: 64
End: 2024-08-21
Attending: EMERGENCY MEDICINE
Payer: COMMERCIAL

## 2024-08-21 ENCOUNTER — APPOINTMENT (OUTPATIENT)
Dept: RADIOLOGY | Facility: MEDICAL CENTER | Age: 64
End: 2024-08-21
Attending: EMERGENCY MEDICINE
Payer: COMMERCIAL

## 2024-08-21 VITALS
OXYGEN SATURATION: 95 % | DIASTOLIC BLOOD PRESSURE: 68 MMHG | SYSTOLIC BLOOD PRESSURE: 122 MMHG | WEIGHT: 253.53 LBS | HEIGHT: 70 IN | BODY MASS INDEX: 36.3 KG/M2 | TEMPERATURE: 97.8 F | HEART RATE: 83 BPM | RESPIRATION RATE: 13 BRPM

## 2024-08-21 DIAGNOSIS — R11.2 NAUSEA AND VOMITING, UNSPECIFIED VOMITING TYPE: ICD-10-CM

## 2024-08-21 DIAGNOSIS — R19.7 DIARRHEA, UNSPECIFIED TYPE: ICD-10-CM

## 2024-08-21 LAB
ALBUMIN SERPL BCP-MCNC: 4.7 G/DL (ref 3.2–4.9)
ALBUMIN/GLOB SERPL: 1.9 G/DL
ALP SERPL-CCNC: 84 U/L (ref 30–99)
ALT SERPL-CCNC: 45 U/L (ref 2–50)
ANION GAP SERPL CALC-SCNC: 15 MMOL/L (ref 7–16)
AST SERPL-CCNC: 41 U/L (ref 12–45)
BASOPHILS # BLD AUTO: 0.2 % (ref 0–1.8)
BASOPHILS # BLD: 0.03 K/UL (ref 0–0.12)
BILIRUB SERPL-MCNC: 1.4 MG/DL (ref 0.1–1.5)
BUN SERPL-MCNC: 16 MG/DL (ref 8–22)
CALCIUM ALBUM COR SERPL-MCNC: 9 MG/DL (ref 8.5–10.5)
CALCIUM SERPL-MCNC: 9.6 MG/DL (ref 8.5–10.5)
CHLORIDE SERPL-SCNC: 104 MMOL/L (ref 96–112)
CO2 SERPL-SCNC: 20 MMOL/L (ref 20–33)
CREAT SERPL-MCNC: 0.97 MG/DL (ref 0.5–1.4)
EKG IMPRESSION: NORMAL
EOSINOPHIL # BLD AUTO: 0.12 K/UL (ref 0–0.51)
EOSINOPHIL NFR BLD: 1 % (ref 0–6.9)
ERYTHROCYTE [DISTWIDTH] IN BLOOD BY AUTOMATED COUNT: 41.5 FL (ref 35.9–50)
FLUAV RNA SPEC QL NAA+PROBE: NEGATIVE
FLUBV RNA SPEC QL NAA+PROBE: NEGATIVE
GFR SERPLBLD CREATININE-BSD FMLA CKD-EPI: 87 ML/MIN/1.73 M 2
GLOBULIN SER CALC-MCNC: 2.5 G/DL (ref 1.9–3.5)
GLUCOSE BLD STRIP.AUTO-MCNC: 111 MG/DL (ref 65–99)
GLUCOSE SERPL-MCNC: 112 MG/DL (ref 65–99)
HCT VFR BLD AUTO: 49.2 % (ref 42–52)
HGB BLD-MCNC: 17.6 G/DL (ref 14–18)
IMM GRANULOCYTES # BLD AUTO: 0.03 K/UL (ref 0–0.11)
IMM GRANULOCYTES NFR BLD AUTO: 0.2 % (ref 0–0.9)
LYMPHOCYTES # BLD AUTO: 0.37 K/UL (ref 1–4.8)
LYMPHOCYTES NFR BLD: 3 % (ref 22–41)
MCH RBC QN AUTO: 32.7 PG (ref 27–33)
MCHC RBC AUTO-ENTMCNC: 35.8 G/DL (ref 32.3–36.5)
MCV RBC AUTO: 91.3 FL (ref 81.4–97.8)
MONOCYTES # BLD AUTO: 0.47 K/UL (ref 0–0.85)
MONOCYTES NFR BLD AUTO: 3.9 % (ref 0–13.4)
NEUTROPHILS # BLD AUTO: 11.16 K/UL (ref 1.82–7.42)
NEUTROPHILS NFR BLD: 91.7 % (ref 44–72)
NRBC # BLD AUTO: 0 K/UL
NRBC BLD-RTO: 0 /100 WBC (ref 0–0.2)
PLATELET # BLD AUTO: 173 K/UL (ref 164–446)
PMV BLD AUTO: 10.3 FL (ref 9–12.9)
POTASSIUM SERPL-SCNC: 3.9 MMOL/L (ref 3.6–5.5)
PROT SERPL-MCNC: 7.2 G/DL (ref 6–8.2)
RBC # BLD AUTO: 5.39 M/UL (ref 4.7–6.1)
RSV RNA SPEC QL NAA+PROBE: NEGATIVE
SARS-COV-2 RNA RESP QL NAA+PROBE: NOTDETECTED
SODIUM SERPL-SCNC: 139 MMOL/L (ref 135–145)
TROPONIN T SERPL-MCNC: 14 NG/L (ref 6–19)
TROPONIN T SERPL-MCNC: 16 NG/L (ref 6–19)
WBC # BLD AUTO: 12.2 K/UL (ref 4.8–10.8)

## 2024-08-21 PROCEDURE — 85025 COMPLETE CBC W/AUTO DIFF WBC: CPT

## 2024-08-21 PROCEDURE — 84484 ASSAY OF TROPONIN QUANT: CPT

## 2024-08-21 PROCEDURE — 700105 HCHG RX REV CODE 258: Performed by: EMERGENCY MEDICINE

## 2024-08-21 PROCEDURE — 80053 COMPREHEN METABOLIC PANEL: CPT

## 2024-08-21 PROCEDURE — 700111 HCHG RX REV CODE 636 W/ 250 OVERRIDE (IP): Mod: JZ | Performed by: EMERGENCY MEDICINE

## 2024-08-21 PROCEDURE — 36415 COLL VENOUS BLD VENIPUNCTURE: CPT

## 2024-08-21 PROCEDURE — 71045 X-RAY EXAM CHEST 1 VIEW: CPT

## 2024-08-21 PROCEDURE — 82962 GLUCOSE BLOOD TEST: CPT

## 2024-08-21 PROCEDURE — 96374 THER/PROPH/DIAG INJ IV PUSH: CPT

## 2024-08-21 PROCEDURE — 93005 ELECTROCARDIOGRAM TRACING: CPT

## 2024-08-21 PROCEDURE — 0241U HCHG SARS-COV-2 COVID-19 NFCT DS RESP RNA 4 TRGT ED POC: CPT

## 2024-08-21 PROCEDURE — 93005 ELECTROCARDIOGRAM TRACING: CPT | Performed by: EMERGENCY MEDICINE

## 2024-08-21 PROCEDURE — 99285 EMERGENCY DEPT VISIT HI MDM: CPT

## 2024-08-21 RX ORDER — ONDANSETRON 2 MG/ML
4 INJECTION INTRAMUSCULAR; INTRAVENOUS ONCE
Status: COMPLETED | OUTPATIENT
Start: 2024-08-21 | End: 2024-08-21

## 2024-08-21 RX ORDER — ONDANSETRON 4 MG/1
4 TABLET, ORALLY DISINTEGRATING ORAL EVERY 6 HOURS PRN
Qty: 10 TABLET | Refills: 0 | Status: SHIPPED | OUTPATIENT
Start: 2024-08-21

## 2024-08-21 RX ORDER — ONDANSETRON 4 MG/1
4 TABLET, ORALLY DISINTEGRATING ORAL EVERY 6 HOURS PRN
Qty: 10 TABLET | Refills: 1 | Status: SHIPPED | OUTPATIENT
Start: 2024-08-21 | End: 2024-08-21

## 2024-08-21 RX ORDER — SODIUM CHLORIDE 9 MG/ML
1000 INJECTION, SOLUTION INTRAVENOUS ONCE
Status: COMPLETED | OUTPATIENT
Start: 2024-08-21 | End: 2024-08-21

## 2024-08-21 RX ORDER — ONDANSETRON 4 MG/1
4 TABLET, ORALLY DISINTEGRATING ORAL EVERY 6 HOURS PRN
Qty: 10 TABLET | Refills: 1 | Status: SHIPPED | OUTPATIENT
Start: 2024-08-21

## 2024-08-21 RX ADMIN — SODIUM CHLORIDE 1000 ML: 9 INJECTION, SOLUTION INTRAVENOUS at 17:45

## 2024-08-21 RX ADMIN — ONDANSETRON 4 MG: 2 INJECTION INTRAMUSCULAR; INTRAVENOUS at 17:58

## 2024-08-21 ASSESSMENT — FIBROSIS 4 INDEX: FIB4 SCORE: 2.19

## 2024-08-21 NOTE — ED TRIAGE NOTES
Chief Complaint   Patient presents with    N/V     2 episodes today    Flu Like Symptoms    Dizziness     Pt was at work when had acute episode of not feeling well. Pt thought he might be dizzy since he hadn't eaten. Pt ate a snack, became acutely nauseas and has had two episodes of emesis since.     Chest Pain     During episodes pt reports few times where chest felt tight.      POC

## 2024-08-22 NOTE — ED PROVIDER NOTES
ED Provider Note    CHIEF COMPLAINT  Chief Complaint   Patient presents with    N/V     2 episodes today    Flu Like Symptoms    Dizziness     Pt was at work when had acute episode of not feeling well. Pt thought he might be dizzy since he hadn't eaten. Pt ate a snack, became acutely nauseas and has had two episodes of emesis since.     Chest Pain     During episodes pt reports few times where chest felt tight.        EXTERNAL RECORDS REVIEWED  Outpatient Notes   cardiology, from the urgent care    HPI/ROS  LIMITATION TO HISTORY   Select: : None  OUTSIDE HISTORIAN(S):  None    Edd Beal is a 63 y.o. male who presents here for evaluation of nausea vomiting, general body aches, and diarrhea.  Patient states that he was at work when he began to have some abdominal cramping, that then transitioned into 2 episodes of vomiting and diarrhea.  Patient states that he was experiencing the vomiting with some chills and some chest tightness.  Patient has no abdominal pain, no back pain, no headache.  He has not taken anything prior to arrival for the same.    PAST MEDICAL HISTORY   has a past medical history of BPH (benign prostatic hyperplasia), Bronchitis, Bulbous urethral stricture (10/23/2020), Chickenpox, Facial basal cell cancer (2007), GERD (gastroesophageal reflux disease), GI bleed, High cholesterol, HLD (hyperlipidemia), Kidney stone, and Sleep apnea.    SURGICAL HISTORY   has a past surgical history that includes eye surgery; arthroplasty; hernia repair; eye surgery; cystoscopy,insert ureteral stent (6/26/2019); eye surgery (09/23/2019); arthroscopy, knee; carpal tunnel release; urethroplasty, stage 2, using buccal mucosal graft (10/23/2020); and cystoscopy (10/23/2020).    FAMILY HISTORY  Family History   Problem Relation Age of Onset    Cancer Mother 60        colon cancer    Heart Disease Mother         CHF       SOCIAL HISTORY  Social History     Tobacco Use    Smoking status: Never    Smokeless  "tobacco: Never   Vaping Use    Vaping status: Never Used   Substance and Sexual Activity    Alcohol use: Yes     Comment: Occasionally    Drug use: No    Sexual activity: Not Currently       CURRENT MEDICATIONS  Home Medications       Reviewed by Annmarie Elliott R.N. (Registered Nurse) on 08/21/24 at 1504  Med List Status: Partial     Medication Last Dose Status   Ascorbic Acid (VITAMIN C) 1000 MG Tab  Active   aspirin 81 MG EC tablet  Active   benzonatate (TESSALON) 100 MG Cap  Active   CITRUS BERGAMOT PO  Active   coenzyme Q-10 30 MG capsule  Active   ibuprofen (MOTRIN) 600 MG Tab  Active   rosuvastatin (CRESTOR) 20 MG Tab  Active   sildenafil citrate (VIAGRA) 100 MG tablet  Active   tamsulosin (FLOMAX) 0.4 MG capsule  Active                  Audit from Redirected Encounters    **Home medications have not yet been reviewed for this encounter**         ALLERGIES  No Known Allergies    PHYSICAL EXAM  VITAL SIGNS: /74   Pulse 81   Temp 36.6 °C (97.8 °F) (Temporal)   Resp 16   Ht 1.778 m (5' 10\")   Wt 115 kg (253 lb 8.5 oz)   SpO2 95%   BMI 36.38 kg/m²    Constitutional: Well developed, well nourished. mild acute distress.  HEENT: Normocephalic, atraumatic. Posterior pharynx clear and moist.  Eyes:  EOMI. Normal sclera.  Neck: Supple, Full range of motion, nontender.  Chest/Pulmonary: clear to ausculation. Symmetrical expansion.   Cardio: Regular rate and rhythm with no murmur.   Abdomen: Soft, nontender. No peritoneal signs. No guarding. No palpable masses.  Musculoskeletal: No deformity, no edema, neurovascular intact.   Neuro: Clear speech, appropriate, cooperative, cranial nerves II-XII grossly intact.  Psych: Normal mood and affect      EKG/LABS  Results for orders placed or performed during the hospital encounter of 08/21/24   CBC with Differential   Result Value Ref Range    WBC 12.2 (H) 4.8 - 10.8 K/uL    RBC 5.39 4.70 - 6.10 M/uL    Hemoglobin 17.6 14.0 - 18.0 g/dL    Hematocrit 49.2 42.0 - 52.0 " %    MCV 91.3 81.4 - 97.8 fL    MCH 32.7 27.0 - 33.0 pg    MCHC 35.8 32.3 - 36.5 g/dL    RDW 41.5 35.9 - 50.0 fL    Platelet Count 173 164 - 446 K/uL    MPV 10.3 9.0 - 12.9 fL    Neutrophils-Polys 91.70 (H) 44.00 - 72.00 %    Lymphocytes 3.00 (L) 22.00 - 41.00 %    Monocytes 3.90 0.00 - 13.40 %    Eosinophils 1.00 0.00 - 6.90 %    Basophils 0.20 0.00 - 1.80 %    Immature Granulocytes 0.20 0.00 - 0.90 %    Nucleated RBC 0.00 0.00 - 0.20 /100 WBC    Neutrophils (Absolute) 11.16 (H) 1.82 - 7.42 K/uL    Lymphs (Absolute) 0.37 (L) 1.00 - 4.80 K/uL    Monos (Absolute) 0.47 0.00 - 0.85 K/uL    Eos (Absolute) 0.12 0.00 - 0.51 K/uL    Baso (Absolute) 0.03 0.00 - 0.12 K/uL    Immature Granulocytes (abs) 0.03 0.00 - 0.11 K/uL    NRBC (Absolute) 0.00 K/uL   Complete Metabolic Panel (CMP)   Result Value Ref Range    Sodium 139 135 - 145 mmol/L    Potassium 3.9 3.6 - 5.5 mmol/L    Chloride 104 96 - 112 mmol/L    Co2 20 20 - 33 mmol/L    Anion Gap 15.0 7.0 - 16.0    Glucose 112 (H) 65 - 99 mg/dL    Bun 16 8 - 22 mg/dL    Creatinine 0.97 0.50 - 1.40 mg/dL    Calcium 9.6 8.5 - 10.5 mg/dL    Correct Calcium 9.0 8.5 - 10.5 mg/dL    AST(SGOT) 41 12 - 45 U/L    ALT(SGPT) 45 2 - 50 U/L    Alkaline Phosphatase 84 30 - 99 U/L    Total Bilirubin 1.4 0.1 - 1.5 mg/dL    Albumin 4.7 3.2 - 4.9 g/dL    Total Protein 7.2 6.0 - 8.2 g/dL    Globulin 2.5 1.9 - 3.5 g/dL    A-G Ratio 1.9 g/dL   Troponins NOW   Result Value Ref Range    Troponin T 16 6 - 19 ng/L   Troponins in two (2) hours   Result Value Ref Range    Troponin T 14 6 - 19 ng/L   ESTIMATED GFR   Result Value Ref Range    GFR (CKD-EPI) 87 >60 mL/min/1.73 m 2   EKG   Result Value Ref Range    Report       [facility] Emergency Dept.    Test Date:  2024  Pt Name:    CHICA VENTURA              Department: ER  MRN:        [id number]                      Room:  Gender:     Male                         Technician: [id number]  :        1960                    Requested By:ER TRIAGE PROTOCOL  Order #:    [id number]                    Reading MD:    Measurements  Intervals                                Axis  Rate:       83                           P:          70  UT:         185                          QRS:        -39  QRSD:       89                           T:          30  QT:         375  QTc:        441    Interpretive Statements  Sinus rhythm  Left axis deviation  Abnormal R-wave progression, late transition  Baseline wander in lead(s) V1  Compared to ECG 08/31/2022 15:46:19  Sinus arrhythmia no longer present     POCT glucose device results   Result Value Ref Range    POC Glucose, Blood 111 (H) 65 - 99 mg/dL   POC CoV-2, FLU A/B, RSV by PCR   Result Value Ref Range    POC Influenza A RNA, PCR Negative Negative    POC Influenza B RNA, PCR Negative Negative    POC RSV, by PCR Negative Negative    POC SARS-CoV-2, PCR NotDetected NotDetected     EKG; normal sinus rhythm rate of 83.  No ST elevation, no ST depression.  QTc is 441.  Compared to EKG from 8/31/2022.    RADIOLOGY/PROCEDURES   I have independently interpreted the diagnostic imaging associated with this visit and am waiting the final reading from the radiologist.   My preliminary interpretation is as follows: below     Radiologist interpretation:  DX-CHEST-PORTABLE (1 VIEW)   Final Result         1. No acute cardiopulmonary abnormalities are identified.          COURSE & MEDICAL DECISION MAKING    ASSESSMENT, COURSE AND PLAN  Care Narrative: This is a 63-year-old male here for evaluation of vomiting and diarrhea.  There is no acute finding on labs, and the patient is feeling better after IV fluids and meds here.  Patient will follow-up as outpatient, or return for any further issues or concerns.    5:45 PM  The pt is improved after iv fluids.  He has no acute finding on labs, and a trending downward trops.  He has a story c/w gastroenteritis, and will be given med here.  He was given iv fluids, has not had  any further vomiting, and will follow up outpatient.       DISPOSITION AND DISCUSSIONS  I have discussed management of the patient with the following physicians and MARCY's: None    Discussion of management with other QHP or appropriate source(s): None    Escalation of care considered, and ultimately not performed: None    Barriers to care at this time, including but not limited to: IV fluids were given.     Decision tools and prescription drugs considered including, but not limited to: None.    FINAL DIAGNOSIS  Vomiting and diarrhea     Electronically signed by: Vipul Corral D.O., 8/21/2024 5:16 PM

## 2024-08-22 NOTE — ED NOTES
Pt given DC instructions and verbalized understanding. Pt is A&O and ambulated out of ER with spouse

## 2024-10-12 ENCOUNTER — APPOINTMENT (OUTPATIENT)
Dept: LAB | Facility: MEDICAL CENTER | Age: 64
End: 2024-10-12
Payer: COMMERCIAL

## 2024-10-12 ENCOUNTER — HOSPITAL ENCOUNTER (OUTPATIENT)
Dept: LAB | Facility: MEDICAL CENTER | Age: 64
End: 2024-10-12
Payer: COMMERCIAL

## 2024-10-12 LAB
BASOPHILS # BLD AUTO: 0.3 % (ref 0–1.8)
BASOPHILS # BLD: 0.02 K/UL (ref 0–0.12)
DHEA-S SERPL-MCNC: 360 UG/DL (ref 51.7–295)
EOSINOPHIL # BLD AUTO: 0.1 K/UL (ref 0–0.51)
EOSINOPHIL NFR BLD: 1.3 % (ref 0–6.9)
ERYTHROCYTE [DISTWIDTH] IN BLOOD BY AUTOMATED COUNT: 40.7 FL (ref 35.9–50)
ESTRADIOL SERPL-MCNC: 30.8 PG/ML
HCT VFR BLD AUTO: 45.8 % (ref 42–52)
HGB BLD-MCNC: 16.1 G/DL (ref 14–18)
IMM GRANULOCYTES # BLD AUTO: 0.02 K/UL (ref 0–0.11)
IMM GRANULOCYTES NFR BLD AUTO: 0.3 % (ref 0–0.9)
LH SERPL-ACNC: 6.6 IU/L (ref 1.7–8.6)
LYMPHOCYTES # BLD AUTO: 1.35 K/UL (ref 1–4.8)
LYMPHOCYTES NFR BLD: 17.4 % (ref 22–41)
MCH RBC QN AUTO: 32 PG (ref 27–33)
MCHC RBC AUTO-ENTMCNC: 35.2 G/DL (ref 32.3–36.5)
MCV RBC AUTO: 91.1 FL (ref 81.4–97.8)
MONOCYTES # BLD AUTO: 0.52 K/UL (ref 0–0.85)
MONOCYTES NFR BLD AUTO: 6.7 % (ref 0–13.4)
NEUTROPHILS # BLD AUTO: 5.76 K/UL (ref 1.82–7.42)
NEUTROPHILS NFR BLD: 74 % (ref 44–72)
NRBC # BLD AUTO: 0 K/UL
NRBC BLD-RTO: 0 /100 WBC (ref 0–0.2)
PLATELET # BLD AUTO: 172 K/UL (ref 164–446)
PMV BLD AUTO: 10.4 FL (ref 9–12.9)
PSA SERPL-MCNC: 0.92 NG/ML (ref 0–4)
RBC # BLD AUTO: 5.03 M/UL (ref 4.7–6.1)
TESTOST SERPL-MCNC: 507 NG/DL (ref 175–781)
WBC # BLD AUTO: 7.8 K/UL (ref 4.8–10.8)

## 2024-10-12 PROCEDURE — 84403 ASSAY OF TOTAL TESTOSTERONE: CPT

## 2024-10-12 PROCEDURE — 85025 COMPLETE CBC W/AUTO DIFF WBC: CPT

## 2024-10-12 PROCEDURE — 84305 ASSAY OF SOMATOMEDIN: CPT

## 2024-10-12 PROCEDURE — 84153 ASSAY OF PSA TOTAL: CPT

## 2024-10-12 PROCEDURE — 36415 COLL VENOUS BLD VENIPUNCTURE: CPT

## 2024-10-12 PROCEDURE — 83002 ASSAY OF GONADOTROPIN (LH): CPT

## 2024-10-12 PROCEDURE — 84402 ASSAY OF FREE TESTOSTERONE: CPT

## 2024-10-12 PROCEDURE — 82627 DEHYDROEPIANDROSTERONE: CPT

## 2024-10-12 PROCEDURE — 82670 ASSAY OF TOTAL ESTRADIOL: CPT

## 2024-10-14 LAB
IGF-I SERPL-MCNC: 133 NG/ML (ref 46–219)
IGF-I Z-SCORE SERPL: 0.4

## 2024-10-17 LAB — MISCELLANEOUS LAB RESULT MISCLAB: ABNORMAL

## 2024-10-31 ENCOUNTER — HOSPITAL ENCOUNTER (OUTPATIENT)
Facility: MEDICAL CENTER | Age: 64
End: 2024-10-31
Attending: UROLOGY
Payer: COMMERCIAL

## 2024-10-31 PROCEDURE — 87077 CULTURE AEROBIC IDENTIFY: CPT

## 2024-10-31 PROCEDURE — 87086 URINE CULTURE/COLONY COUNT: CPT

## 2024-11-06 ENCOUNTER — OFFICE VISIT (OUTPATIENT)
Dept: SLEEP MEDICINE | Facility: MEDICAL CENTER | Age: 64
End: 2024-11-06
Attending: NURSE PRACTITIONER
Payer: COMMERCIAL

## 2024-11-06 VITALS
RESPIRATION RATE: 17 BRPM | SYSTOLIC BLOOD PRESSURE: 110 MMHG | BODY MASS INDEX: 36.51 KG/M2 | WEIGHT: 255 LBS | OXYGEN SATURATION: 96 % | HEIGHT: 70 IN | HEART RATE: 74 BPM | DIASTOLIC BLOOD PRESSURE: 62 MMHG

## 2024-11-06 DIAGNOSIS — G47.33 OSA (OBSTRUCTIVE SLEEP APNEA): ICD-10-CM

## 2024-11-06 PROCEDURE — 3078F DIAST BP <80 MM HG: CPT | Performed by: NURSE PRACTITIONER

## 2024-11-06 PROCEDURE — 3074F SYST BP LT 130 MM HG: CPT | Performed by: NURSE PRACTITIONER

## 2024-11-06 PROCEDURE — 99214 OFFICE O/P EST MOD 30 MIN: CPT | Performed by: NURSE PRACTITIONER

## 2024-11-06 PROCEDURE — 99213 OFFICE O/P EST LOW 20 MIN: CPT | Performed by: NURSE PRACTITIONER

## 2024-11-06 ASSESSMENT — PATIENT HEALTH QUESTIONNAIRE - PHQ9: CLINICAL INTERPRETATION OF PHQ2 SCORE: 0

## 2024-11-06 ASSESSMENT — FIBROSIS 4 INDEX: FIB4 SCORE: 2.24

## 2024-11-06 NOTE — PROGRESS NOTES
Chief Complaint   Patient presents with    Follow-Up       Last Office Visit 8/19/22 with SATHISH Brennan        HPI:  Edd Bela is a 63 y.o. year old male here today for follow-up on JOAQUINA.  Last seen 8/19/2022 by me. Currently using BIPAP 19/15cm (adjusted at last OV). Current device obtained 2020.  Patient is currently using a fullface mask and denies any difficulty with pressures, but does experience air leaks through the mask.  He does not replace because regularly.  Overall he notes improved sleep quality, but does take naps during the day.  He is sleeping approximately 6 to 8 hours daily.  He denies any excessive daytime sleepiness, morning headaches, palpitations.     Compliance was reviewed with patient and does show 100% use with an average time of 8 hours and 13 minutes.  AHI is 2.5.  There is no evidence of mask leakSLEEP HX:  He has been on therapy for >15yrs.  PSG split night 5/28/20 indicates sleep efficiency was 61%, severe sleep apnea with overall AHI of 83.5/h and O2 tim of 86%.  He was titrated on CPAP and BiPAP with an incomplete titration with best response to bilevel 21/17 cm with reduced AHI of 8.57/h and O2 tim of 94%.  Recommendation was beginning bilevel 21/17 cm or 22/18 cm with a large Vitera mask with close follow-up.   .           ROS: As per HPI and otherwise negative if not stated.    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    Chickenpox     Facial basal cell cancer 2007    lip, no recurrence    GERD (gastroesophageal reflux disease)     GI bleed     High cholesterol     HLD (hyperlipidemia)     Kidney stone     Sleep apnea        Past Surgical History:   Procedure Laterality Date    URETHROPLASTY, STAGE 2, USING BUCCAL MUCOSAL GRAFT  10/23/2020    Procedure: URETHROPLASTY,STAGE 2,USING BUCCAL MUCOSAL GRAFTONE-STAGE RECONSTRUCTION OF MALE ANTERIOR URETHRA;  Surgeon: Tai Morales,  "M.D.;  Location: SURGERY [facility];  Service: Urology    CYSTOSCOPY  10/23/2020    Procedure: CYSTOSCOPY WITH LITHOLITHALOPAXY;  Surgeon: Tai Morales M.D.;  Location: SURGERY [facility];  Service: Urology    EYE SURGERY  09/23/2019    Left eye laser surgery    SD CYSTOSCOPY,INSERT URETERAL STENT  6/26/2019    Procedure: CYSTOSCOPY- LASER LITHOTRIPSY  FOR URETHERAL STONE.;  Surgeon: Dion Olivares M.D.;  Location: SURGERY [facility];  Service: Urology    ARTHROPLASTY      right knee    ARTHROSCOPY, KNEE      CARPAL TUNNEL RELEASE      EYE SURGERY      cataracts bilateral, corneal transplant    EYE SURGERY      cataract AND corneal replacement BILATERALLY    HERNIA REPAIR         Family History   Problem Relation Age of Onset    Cancer Mother 60        colon cancer    Heart Disease Mother         CHF       Allergies as of 11/06/2024    (No Known Allergies)        Vitals:  /62 (BP Location: Right arm, Patient Position: Sitting, BP Cuff Size: Adult)   Pulse 74   Resp 17   Ht 1.778 m (5' 10\")   Wt 116 kg (255 lb)   SpO2 96%     Current medications as of today   Current Outpatient Medications   Medication Sig Dispense Refill    ondansetron (ZOFRAN ODT) 4 MG TABLET DISPERSIBLE Take 1 Tablet by mouth every 6 hours as needed for Nausea/Vomiting. 10 Tablet 0    ondansetron (ZOFRAN ODT) 4 MG TABLET DISPERSIBLE Take 1 Tablet by mouth every 6 hours as needed for Nausea/Vomiting. 10 Tablet 1    rosuvastatin (CRESTOR) 20 MG Tab Take 1 Tablet by mouth every day. 90 Tablet 3    sildenafil citrate (VIAGRA) 100 MG tablet TAKE 1 TABLET BY MOUTH DAILY AS NEEDED      benzonatate (TESSALON) 100 MG Cap Take 1 Capsule by mouth 3 times a day as needed for Cough. 60 Capsule 0    ibuprofen (MOTRIN) 600 MG Tab       CITRUS BERGAMOT PO Take  by mouth.      coenzyme Q-10 30 MG capsule Take 60 mg by mouth every day.      aspirin 81 MG EC tablet 1 Tablet.      Ascorbic Acid (VITAMIN C) 1000 MG Tab Take 1 Tablet by " mouth at bedtime.      tamsulosin (FLOMAX) 0.4 MG capsule Take 1 Capsule by mouth at bedtime.       No current facility-administered medications for this visit.         Physical Exam:   Gen:           Alert and oriented, No apparent distress. Mood and affect appropriate, normal interaction with examiner.  Eyes:          PERRL, EOM intact, sclere white, conjunctive moist.  Ears:          Not examined.   Hearing:     Grossly intact.  Nose:          Normal, no lesions or deformities.  Dentition:    Good dentition.  Oropharynx:   Tongue normal, posterior pharynx without erythema or exudate.  Neck:        Supple, trachea midline, no masses.  Respiratory Effort: No intercostal retractions or use of accessory muscles.   Lung Auscultation:      Clear to auscultation bilaterally; no rales, rhonchi or wheezing.  CV:            Regular rate and rhythm. No murmurs, rubs or gallops.  Abd:           Not examined.   Lymphadenopathy: Not examined.  Gait and Station: Normal.  Digits and Nails: No clubbing, cyanosis, petechiae, or nodes.   Cranial Nerves: II-XII grossly intact.  Skin:        No rashes, lesions or ulcers noted.               Ext:           No cyanosis or edema.      Assessment:  1. JOAQUINA (obstructive sleep apnea)  DME Mask and Supplies        Plan:  Benefiting from BiPAP therapy.  Compliance shows adequate use and control of JOAQUINA with a residual AHI of 2.5.  Order placed for mask and supplies through [facility].  Advise annual follow-up, but can be seen sooner if needed.  Patient is benefiting from positive airway pressure.    Please note that this dictation was created using voice recognition software. I have made every reasonable attempt to correct obvious errors, but it is possible there are errors of grammar and possibly content that I did not discover before finalizing the note.

## 2024-11-14 ENCOUNTER — APPOINTMENT (OUTPATIENT)
Dept: ADMISSIONS | Facility: MEDICAL CENTER | Age: 64
End: 2024-11-14
Attending: UROLOGY
Payer: COMMERCIAL

## 2024-11-20 ENCOUNTER — PRE-ADMISSION TESTING (OUTPATIENT)
Dept: ADMISSIONS | Facility: MEDICAL CENTER | Age: 64
End: 2024-11-20
Attending: UROLOGY
Payer: COMMERCIAL

## 2024-11-20 NOTE — OR NURSING
RN pre admit tele appointment complete.  Medication and fasting instructions given per anesthesia protocol unless otherwise instructed by physician.  Patient stated understanding of all instructions and had no further questions. Patient lives out of town and can only come for testing before work at the earliest testing appt.  EKG will need to be done day of surgery due to this. Encouraged increased oral fluid intake the day prior to procedure/surgery including intake of electrolyte drinks such as sports drink or electrolyte water. Patient may have clear liquids until 2 hours prior to surgery.  Surgery date 12/9/24

## 2024-11-26 ENCOUNTER — PRE-ADMISSION TESTING (OUTPATIENT)
Dept: ADMISSIONS | Facility: MEDICAL CENTER | Age: 64
End: 2024-11-26
Attending: UROLOGY
Payer: COMMERCIAL

## 2024-11-26 DIAGNOSIS — Z01.812 PRE-OPERATIVE LABORATORY EXAMINATION: ICD-10-CM

## 2024-11-26 LAB
AMORPHOUS CRYSTALS 1764: PRESENT /HPF
APPEARANCE UR: ABNORMAL
BACTERIA #/AREA URNS HPF: ABNORMAL /HPF
BILIRUB UR QL STRIP.AUTO: NEGATIVE
CASTS URNS QL MICRO: ABNORMAL /LPF (ref 0–2)
COLOR UR: YELLOW
EPITHELIAL CELLS 1715: ABNORMAL /HPF (ref 0–5)
GLUCOSE UR STRIP.AUTO-MCNC: NEGATIVE MG/DL
HYALINE CAST   1831: PRESENT /LPF
KETONES UR STRIP.AUTO-MCNC: NEGATIVE MG/DL
LEUKOCYTE ESTERASE UR QL STRIP.AUTO: ABNORMAL
MICRO URNS: ABNORMAL
MUCOUS THREADS URNS QL MICRO: PRESENT /HPF
NITRITE UR QL STRIP.AUTO: NEGATIVE
PH UR STRIP.AUTO: 6 [PH] (ref 5–8)
PROT UR QL STRIP: 30 MG/DL
RBC # URNS HPF: ABNORMAL /HPF (ref 0–2)
RBC UR QL AUTO: ABNORMAL
SP GR UR STRIP.AUTO: 1.02
UROBILINOGEN UR STRIP.AUTO-MCNC: 1 EU/DL
WBC #/AREA URNS HPF: ABNORMAL /HPF

## 2024-11-26 PROCEDURE — 87077 CULTURE AEROBIC IDENTIFY: CPT

## 2024-11-26 PROCEDURE — 81001 URINALYSIS AUTO W/SCOPE: CPT

## 2024-11-26 PROCEDURE — 87086 URINE CULTURE/COLONY COUNT: CPT

## 2024-12-09 ENCOUNTER — ANESTHESIA EVENT (OUTPATIENT)
Dept: SURGERY | Facility: MEDICAL CENTER | Age: 64
End: 2024-12-09
Payer: COMMERCIAL

## 2024-12-09 ENCOUNTER — HOSPITAL ENCOUNTER (OUTPATIENT)
Facility: MEDICAL CENTER | Age: 64
End: 2024-12-09
Attending: UROLOGY | Admitting: UROLOGY
Payer: COMMERCIAL

## 2024-12-09 ENCOUNTER — ANESTHESIA (OUTPATIENT)
Dept: SURGERY | Facility: MEDICAL CENTER | Age: 64
End: 2024-12-09
Payer: COMMERCIAL

## 2024-12-09 ENCOUNTER — APPOINTMENT (OUTPATIENT)
Dept: RADIOLOGY | Facility: MEDICAL CENTER | Age: 64
End: 2024-12-09
Attending: UROLOGY
Payer: COMMERCIAL

## 2024-12-09 VITALS
BODY MASS INDEX: 35.82 KG/M2 | DIASTOLIC BLOOD PRESSURE: 72 MMHG | RESPIRATION RATE: 18 BRPM | TEMPERATURE: 96.8 F | WEIGHT: 250.22 LBS | HEIGHT: 70 IN | HEART RATE: 63 BPM | SYSTOLIC BLOOD PRESSURE: 144 MMHG | OXYGEN SATURATION: 96 %

## 2024-12-09 PROCEDURE — 160046 HCHG PACU - 1ST 60 MINS PHASE II: Performed by: UROLOGY

## 2024-12-09 PROCEDURE — 160035 HCHG PACU - 1ST 60 MINS PHASE I: Performed by: UROLOGY

## 2024-12-09 PROCEDURE — 160039 HCHG SURGERY MINUTES - EA ADDL 1 MIN LEVEL 3: Performed by: UROLOGY

## 2024-12-09 PROCEDURE — 700105 HCHG RX REV CODE 258: Performed by: UROLOGY

## 2024-12-09 PROCEDURE — C1729 CATH, DRAINAGE: HCPCS | Performed by: UROLOGY

## 2024-12-09 PROCEDURE — 160048 HCHG OR STATISTICAL LEVEL 1-5: Performed by: UROLOGY

## 2024-12-09 PROCEDURE — 700102 HCHG RX REV CODE 250 W/ 637 OVERRIDE(OP): Performed by: UROLOGY

## 2024-12-09 PROCEDURE — 700101 HCHG RX REV CODE 250: Performed by: ANESTHESIOLOGY

## 2024-12-09 PROCEDURE — 160002 HCHG RECOVERY MINUTES (STAT): Performed by: UROLOGY

## 2024-12-09 PROCEDURE — 700102 HCHG RX REV CODE 250 W/ 637 OVERRIDE(OP): Performed by: ANESTHESIOLOGY

## 2024-12-09 PROCEDURE — 700111 HCHG RX REV CODE 636 W/ 250 OVERRIDE (IP): Performed by: ANESTHESIOLOGY

## 2024-12-09 PROCEDURE — 160028 HCHG SURGERY MINUTES - 1ST 30 MINS LEVEL 3: Performed by: UROLOGY

## 2024-12-09 PROCEDURE — A9270 NON-COVERED ITEM OR SERVICE: HCPCS | Performed by: UROLOGY

## 2024-12-09 PROCEDURE — 160009 HCHG ANES TIME/MIN: Performed by: UROLOGY

## 2024-12-09 PROCEDURE — 160025 RECOVERY II MINUTES (STATS): Performed by: UROLOGY

## 2024-12-09 PROCEDURE — A9270 NON-COVERED ITEM OR SERVICE: HCPCS | Performed by: ANESTHESIOLOGY

## 2024-12-09 RX ORDER — LIDOCAINE HYDROCHLORIDE 20 MG/ML
INJECTION, SOLUTION EPIDURAL; INFILTRATION; INTRACAUDAL; PERINEURAL PRN
Status: DISCONTINUED | OUTPATIENT
Start: 2024-12-09 | End: 2024-12-09 | Stop reason: SURG

## 2024-12-09 RX ORDER — AMOXICILLIN 500 MG/1
500 CAPSULE ORAL 3 TIMES DAILY
COMMUNITY
Start: 2024-12-02

## 2024-12-09 RX ORDER — ACETAMINOPHEN 500 MG
1000 TABLET ORAL EVERY 6 HOURS PRN
COMMUNITY

## 2024-12-09 RX ORDER — SODIUM CHLORIDE, SODIUM LACTATE, POTASSIUM CHLORIDE, CALCIUM CHLORIDE 600; 310; 30; 20 MG/100ML; MG/100ML; MG/100ML; MG/100ML
1000 INJECTION, SOLUTION INTRAVENOUS CONTINUOUS
Status: DISCONTINUED | OUTPATIENT
Start: 2024-12-09 | End: 2024-12-09 | Stop reason: HOSPADM

## 2024-12-09 RX ORDER — ONDANSETRON 2 MG/ML
4 INJECTION INTRAMUSCULAR; INTRAVENOUS
Status: DISCONTINUED | OUTPATIENT
Start: 2024-12-09 | End: 2024-12-09 | Stop reason: HOSPADM

## 2024-12-09 RX ORDER — PHENAZOPYRIDINE HYDROCHLORIDE 100 MG/1
200 TABLET, FILM COATED ORAL
Status: DISCONTINUED | OUTPATIENT
Start: 2024-12-09 | End: 2024-12-09 | Stop reason: HOSPADM

## 2024-12-09 RX ORDER — MEPERIDINE HYDROCHLORIDE 25 MG/ML
12.5 INJECTION INTRAMUSCULAR; INTRAVENOUS; SUBCUTANEOUS
Status: DISCONTINUED | OUTPATIENT
Start: 2024-12-09 | End: 2024-12-09 | Stop reason: HOSPADM

## 2024-12-09 RX ORDER — HALOPERIDOL 5 MG/ML
1 INJECTION INTRAMUSCULAR
Status: DISCONTINUED | OUTPATIENT
Start: 2024-12-09 | End: 2024-12-09 | Stop reason: HOSPADM

## 2024-12-09 RX ORDER — CEFAZOLIN SODIUM 1 G/3ML
INJECTION, POWDER, FOR SOLUTION INTRAMUSCULAR; INTRAVENOUS PRN
Status: DISCONTINUED | OUTPATIENT
Start: 2024-12-09 | End: 2024-12-09 | Stop reason: SURG

## 2024-12-09 RX ORDER — DEXAMETHASONE SODIUM PHOSPHATE 4 MG/ML
INJECTION, SOLUTION INTRA-ARTICULAR; INTRALESIONAL; INTRAMUSCULAR; INTRAVENOUS; SOFT TISSUE PRN
Status: DISCONTINUED | OUTPATIENT
Start: 2024-12-09 | End: 2024-12-09 | Stop reason: SURG

## 2024-12-09 RX ORDER — ONDANSETRON 2 MG/ML
INJECTION INTRAMUSCULAR; INTRAVENOUS PRN
Status: DISCONTINUED | OUTPATIENT
Start: 2024-12-09 | End: 2024-12-09 | Stop reason: SURG

## 2024-12-09 RX ORDER — SODIUM CHLORIDE, SODIUM LACTATE, POTASSIUM CHLORIDE, CALCIUM CHLORIDE 600; 310; 30; 20 MG/100ML; MG/100ML; MG/100ML; MG/100ML
INJECTION, SOLUTION INTRAVENOUS CONTINUOUS
Status: DISCONTINUED | OUTPATIENT
Start: 2024-12-09 | End: 2024-12-09 | Stop reason: HOSPADM

## 2024-12-09 RX ORDER — OXYCODONE HCL 5 MG/5 ML
5 SOLUTION, ORAL ORAL
Status: COMPLETED | OUTPATIENT
Start: 2024-12-09 | End: 2024-12-09

## 2024-12-09 RX ORDER — MIDAZOLAM HYDROCHLORIDE 1 MG/ML
INJECTION INTRAMUSCULAR; INTRAVENOUS PRN
Status: DISCONTINUED | OUTPATIENT
Start: 2024-12-09 | End: 2024-12-09 | Stop reason: HOSPADM

## 2024-12-09 RX ORDER — CELECOXIB 200 MG/1
200 CAPSULE ORAL ONCE
Status: COMPLETED | OUTPATIENT
Start: 2024-12-09 | End: 2024-12-09

## 2024-12-09 RX ORDER — IBUPROFEN 200 MG
600 TABLET ORAL EVERY 6 HOURS PRN
COMMUNITY

## 2024-12-09 RX ORDER — ACETAMINOPHEN 500 MG
1000 TABLET ORAL ONCE
Status: COMPLETED | OUTPATIENT
Start: 2024-12-09 | End: 2024-12-09

## 2024-12-09 RX ADMIN — PHENAZOPYRIDINE 200 MG: 100 TABLET ORAL at 15:48

## 2024-12-09 RX ADMIN — ONDANSETRON 4 MG: 2 INJECTION INTRAMUSCULAR; INTRAVENOUS at 14:09

## 2024-12-09 RX ADMIN — CEFAZOLIN 2 G: 1 INJECTION, POWDER, FOR SOLUTION INTRAMUSCULAR; INTRAVENOUS at 13:39

## 2024-12-09 RX ADMIN — FENTANYL CITRATE 25 MCG: 50 INJECTION, SOLUTION INTRAMUSCULAR; INTRAVENOUS at 13:42

## 2024-12-09 RX ADMIN — PROPOFOL 160 MG: 10 INJECTION, EMULSION INTRAVENOUS at 13:39

## 2024-12-09 RX ADMIN — LIDOCAINE HYDROCHLORIDE 100 MG: 20 INJECTION, SOLUTION EPIDURAL; INFILTRATION; INTRACAUDAL; PERINEURAL at 13:39

## 2024-12-09 RX ADMIN — DEXAMETHASONE SODIUM PHOSPHATE 4 MG: 4 INJECTION INTRA-ARTICULAR; INTRALESIONAL; INTRAMUSCULAR; INTRAVENOUS; SOFT TISSUE at 13:42

## 2024-12-09 RX ADMIN — ACETAMINOPHEN 1000 MG: 500 TABLET, FILM COATED ORAL at 10:37

## 2024-12-09 RX ADMIN — MIDAZOLAM HYDROCHLORIDE 2 MG: 1 INJECTION, SOLUTION INTRAMUSCULAR; INTRAVENOUS at 13:35

## 2024-12-09 RX ADMIN — SODIUM CHLORIDE, POTASSIUM CHLORIDE, SODIUM LACTATE AND CALCIUM CHLORIDE: 600; 310; 30; 20 INJECTION, SOLUTION INTRAVENOUS at 13:34

## 2024-12-09 RX ADMIN — FENTANYL CITRATE 25 MCG: 50 INJECTION, SOLUTION INTRAMUSCULAR; INTRAVENOUS at 13:50

## 2024-12-09 RX ADMIN — CELECOXIB 200 MG: 200 CAPSULE ORAL at 10:37

## 2024-12-09 RX ADMIN — OXYCODONE HYDROCHLORIDE 5 MG: 5 SOLUTION ORAL at 14:44

## 2024-12-09 RX ADMIN — FENTANYL CITRATE 50 MCG: 50 INJECTION, SOLUTION INTRAMUSCULAR; INTRAVENOUS at 13:39

## 2024-12-09 ASSESSMENT — PAIN DESCRIPTION - PAIN TYPE
TYPE: SURGICAL PAIN

## 2024-12-09 ASSESSMENT — PAIN SCALES - GENERAL: PAIN_LEVEL: 0

## 2024-12-09 ASSESSMENT — FIBROSIS 4 INDEX: FIB4 SCORE: 2.27

## 2024-12-09 NOTE — ANESTHESIA POSTPROCEDURE EVALUATION
Patient: Edd Beal    Procedure Summary       Date: 12/09/24 Room / Location: [place] [address] / SURGERY [facility]    Anesthesia Start: 1334 Anesthesia Stop: 1430    Procedures:       CYSTOURETHROSCOPY WITH DIRECT VISION INTERNAL URETHROSCOPY AND URETHRAL BALLOON DILATION (Bladder)      REMOVAL, CALCULUS, BLADDER (Bladder) Diagnosis: (URETHRAL STRICTURE)    Surgeons: Tai Morales M.D. Responsible Provider: Mia Leroy M.D.    Anesthesia Type: general ASA Status: 2            Final Anesthesia Type: general  Last vitals  BP   Blood Pressure: 125/74    Temp   36 °C (96.8 °F)    Pulse   61   Resp   15    SpO2   94 %      Anesthesia Post Evaluation    Patient location during evaluation: PACU  Patient participation: complete - patient participated  Level of consciousness: awake  Pain score: 0    Airway patency: patent  Anesthetic complications: no  Cardiovascular status: hemodynamically stable  Respiratory status: acceptable  Hydration status: acceptable    PONV: none          No notable events documented.     Nurse Pain Score: 0 (NPRS)

## 2024-12-09 NOTE — ANESTHESIA PROCEDURE NOTES
Airway    Date/Time: 12/9/2024 1:40 PM    Performed by: Mia Leroy M.D.  Authorized by: Mia Leroy M.D.    Location:  OR  Urgency:  Elective  Indications for Airway Management:  Anesthesia      Spontaneous Ventilation: absent    Sedation Level:  Deep  Preoxygenated: Yes    Mask Difficulty Assessment:  0 - not attempted  Final Airway Type:  Supraglottic airway  Final Supraglottic Airway:  Standard LMA    SGA Size:  5  Number of Attempts at Approach:  1

## 2024-12-09 NOTE — OR NURSING
1428 Pt arrived to PACU with Anesthesiologist and OR RN. AAOx4. Even, unlabored respirations. VSS. Abernathy catheter in place with minimal drainage coming from urethra.     1448 POC update given to wife, Kate, over the phone. All questions answered.     1447 Report called to ROJAS Myrick in phase 2.     1552 Pt AAOx4, pain well controlled per pt. Pt transported to pre-op 25 with RN. Chart with patient.

## 2024-12-09 NOTE — ANESTHESIA PREPROCEDURE EVALUATION
Case: [id number] Date/Time: 12/09/24 1145    Procedures:       CYSTOURETHROSCOPY WITH DIRECT VISION INTERNAL URETHROSCOPY AND URETHRAL BALLOON DILATION      URETHROTOMY, INTERNAL    Pre-op diagnosis: URETHRAL STRICTURE    Location: [facility] OR 12 / SURGERY [facility]    Surgeons: Tai Morales M.D.          65 yo w/urethral stricture    Relevant Problems   ANESTHESIA   (positive) Obstructive sleep apnea syndrome      CARDIAC   (positive) High coronary artery calcium score      Other   (positive) Benign prostatic hyperplasia with urinary retention   (positive) Dyslipidemia   (positive) Obesity (BMI 30-39.9)     Denies CAD, CP/SOB, CVA, HTN, DM, LUNG DZ, GERD, URI    Physical Exam    Airway   Mallampati: II  TM distance: >3 FB  Neck ROM: full       Cardiovascular   Rhythm: regular  Rate: normal  (-) murmur     Dental - normal exam           Pulmonary   Breath sounds clear to auscultation     Abdominal    Neurological - normal exam                   Anesthesia Plan    ASA 2       Plan - general       Airway plan will be LMA          Induction: intravenous    Postoperative Plan: Postoperative administration of opioids is intended.    Pertinent diagnostic labs and testing reviewed    Informed Consent:    Anesthetic plan and risks discussed with patient.    Use of blood products discussed with: patient whom consented to blood products.

## 2024-12-09 NOTE — PROGRESS NOTES
Medication history reviewed with PT at bedside    [facility] rec is complete per PT reporting    Allergies reviewed.     PT is currently taking Amoxicillin 500mg 10 day course started 12/03/2024. Last dose was 12/09/2024 at 95006.    Patient is not taking anticoagulants.    Preferred pharmacy for this visit - [facility] in [place] ([phone])

## 2024-12-09 NOTE — OR NURSING
Assume care for pt in pre-op. Pt pre op checklist complete. Consents for surgical. IV placed. Bed in lowest position, call light within reach, all questions answered.

## 2024-12-09 NOTE — ANESTHESIA TIME REPORT
Anesthesia Start and Stop Event Times       Date Time Event    12/9/2024 1326 Ready for Procedure     1334 Anesthesia Start     1430 Anesthesia Stop          Responsible Staff  12/09/24      Name Role Begin End    Mia Leroy M.D. Anesth 1334 1430          Overtime Reason:  no overtime (within assigned shift)    Comments:

## 2024-12-09 NOTE — OP REPORT
[facility] [place] Operative Report    Pre-operative Diagnosis: 1. Urethral stricture, bulbar  2. History of urethroplasty   Post-operative Diagnosis: Same as above   Procedure: 1. Cystoscopy  2. Optilume balloon dilation of urethral stricture (72606)  3. Direct Vision Internal Urethrotomy (67765)  4. Removal of bladder stones   Surgeon Tai Morales M.D.,    Assistant: None   Anesthesia: LMA, General, Rad GROVES   Estimated Blood Loss: minimal   Specimens: 1. none   Drains: 16fr pichardo catheter with 10cc in balloon   Wound class II clean contaminated   Condition and complications Stable, procedure well tolerated without complications   Disposition:  PACU, 2 days catheter to be removed by office staff, then 3m follow up MD   Findings: 1. Cystoscopy findings: normal penile urethra, wide caliber buccal graft in the bulbar urethra, at the proximal aspect of the buccal graft repair there was recurrence of stricture, fairly dense and this required DVIU.  The length of the scar tissue is approximately 3 cm and extends into the distal membranous urethra.  This also required urethrotome cold knife.  Overall the prostate has mild lateral lobe hyperplasia with moderate high riding bladder neck.  Within the bladder there was some small stone debris and this irrigated out through the rigid cystoscope with multiple fills and empties.  No masses or stones large enough to require laser lithotripsy.  If he has recurrence of stricture he will require additional buccal graft urethroplasty will into the membranous urethra.     Indications for Procedure:  This patient has 1 previous endoscopic management for urethral stricture and buccal graft urethroplasty in 2020, and recently found to have urtheral stricture in the bulbar urethra.He hs struggled recently with UTIs. Flexible cystoscopy demonstrated proximal bulbar stricture in the proximal aspect of the previous repair. He was offered urethroplasty as an alternative, but after full  discussion of risks/benefits/alernatives, he would like to try endoscopic management with Optilume balloon dilation system.     Risks discussed, but not limited to, were infection, sepsis, urethral stricture recurrence, penile pain, hematuria, bleeding, bladder injury, need for secondary procedure, possible need for pichardo post op, possible admission post operatively, and the cardiovascular and pulmonary complications of anesthesia/surgery including DVT, Mi, PE, and death.      Procedure in Detail:    The Patient was explained the risks and benefits of the procedure including bleeding, infection, bladder ruptured, pain, hematuria, and elects to proceed. In the lithotomy position, they were prepped and draped in the usual sterile fashion, and given appropriate antibiotics.      No significant distal stricture requiring dilation.  I used a 22 rigid cystoscope to perform urethroscopy and passed a sensor tip wire into the bladder.      I then used a 26fr TUR scope and cold knife to incise at the 12 o'clock position opening up the urethral stricture to approximately 20 [place]. There was additional scar band in the membranous urethra that required additional incision. This allowed me to pass the cystoscope into the bladder completing rigid cystoscopy. I then irrigated out the bladder stones as noted above.     I switched back to the 22fr rigid cystoscope and over the wire I passed the 30 [place] Optilume system over the wire.  Under direct vision I position this appropriately so the medication would infiltrate the tissue that had just been opened with DVIU.      The balloon was then inflated to 10 matty and maintained at this pressure for 5 minutes total.  The dilation system was then taken down pressure released from balloon and the balloon dilation system was backloaded off the wire. 16 [place] catheter was then placed over the wire into the bladder draining clear yellow urine.  This concluded my portion of the procedure.      The patient tolerated the procedure well, was extubated and transferred to the recovery room.        Tai Morales MD  [phone]

## 2024-12-09 NOTE — DISCHARGE INSTRUCTIONS
DR. Morales DISCHARGE INSTRUCTIONS FOLLOWING   Direct Vision Internal Urethrotomy (DVIU) with optilume balloon dilatoin     DIET:  You can resume your regular diet. We encourage you to eat well-balanced and nutritious meals.      ACTIVITY:  Please restrain from strenuous activity or heavy lifting (more than 10 pounds) for the 5 days.  Please walk daily as much as tolerated, making exercise a part of your daily life. Do not drive while using narcotics for pain control.     CATHETER CARE:  1. You have a pichardo catheter in place, and you should care for it as instructed by nurse. This will be removed at a follow up visit in our clinic if you prefer. 12/11/2024     MEDICATIONS:  1. Please use over the counter Tylenol or Ibuprofen for pain control as needed  2. Continue your home medications with the exception of none as directed  3. You may use pyridium (over the counter azo) to help with urination  4.  If you are using aspirin, Plavix, or coumadin, please don’t restart these medications until two days after your discharge if you are not having large amounts of blood in the urine.        FOLLOW-UP:  We will call you to schedule your follow up appointment in 2 days for pichardo removal. If you have not heard from us in 1-2 business days, please call [phone] to schedule your follow-up appointment. You may also contact this number if you have questions or concerns that can be answered by Dr. Morales's staff.     We will follow up in 3 months to see how your are urinating.     WARNING SIGNS:  Fever greater than 101 degrees Fahrenheit, chills, nausea or vomiting, Large amount of clots in urine that make it difficult to urinate or for urine to drain from pichardo, increasing pain, or abdominal swelling. If you are experiencing these symptoms, call the Urology Clinic or go to your local PCP or emergency room.    It is normal to see blood in your urine for up to 2 weeks even from surgery. The urine may clear up entirely, and  then turn bloody again a few days later depending on your activity level; do not be alarmed. However, if you experience severe pain or tenderness, have a lot of increased bleeding, or find that you are unable to urinate because of large clots, please notify your doctor immediately     MEDICAL HELP DURING NORMAL BUSINESS HOURS  Between the hours of 7 AM and 7 PM, please call [phone] to speak with Dr. Yanick Morales's staff.     MEDICAL HELP AFTER HOURS  If you have a serious emergency such as chest pain, shortness of breath, relentless pain you should call 911. For other urgent problems after hours you may contact the urology physician on call by phoning the [phone]. You may also visit the Emergency room at local hospital for help.    For non-emergent problems such as prescription refills or routine questions, please do your best to contact us during normal business hours. This after-hours number should be used for urgent or emergent questions only.      ACTIVITY: Rest and take it easy for the first 24 hours.  A responsible adult is recommended to remain with you during that time.  It is normal to feel sleepy.  We encourage you to not do anything that requires balance, judgment or coordination.    FOR 24 HOURS DO NOT:  Drive, operate machinery or run household appliances.  Drink beer or alcoholic beverages.  Make important decisions or sign legal documents.        DIET: To avoid nausea, slowly advance diet as tolerated, avoiding spicy or greasy foods for the first day.  Add more substantial food to your diet according to your physician's instructions.  Babies can be fed formula or breast milk as soon as they are hungry.  INCREASE FLUIDS AND FIBER TO AVOID CONSTIPATION.        MEDICATIONS: Resume taking daily medication.  Take prescribed pain medication with food.  If no medication is prescribed, you may take non-aspirin pain medication if needed.  PAIN MEDICATION CAN BE VERY CONSTIPATING.  Take a stool  softener or laxative such as senokot, pericolace, or milk of magnesia if needed.     No new Prescription given .   Given Tylenol 100mg , celebrex 200mg at 10:37 AMLast pain medication given at Oxycodone at 2:44 and Pyridium at .    A follow-up appointment 12/11/24 for catheter removal .  You should CALL YOUR PHYSICIAN if you develop:  Fever greater than 101 degrees F.  Pain not relieved by medication, or persistent nausea or vomiting.  Excessive bleeding (blood soaking through dressing) or unexpected drainage from the wound.  Extreme redness or swelling around the incision site, drainage of pus or foul smelling drainage.  Inability to urinate or empty your bladder within 8 hours.  Problems with breathing or chest pain.    You should call 911 if you develop problems with breathing or chest pain.  If you are unable to contact your doctor or surgical center, you should go to the nearest emergency room or urgent care center.  Physician's telephone #: [phone]     MILD FLU-LIKE SYMPTOMS ARE NORMAL.  YOU MAY EXPERIENCE GENERALIZED MUSCLE ACHES, THROAT IRRITATION, HEADACHE AND/OR SOME NAUSEA.    If any questions arise, call your doctor.  If your doctor is not available, please feel free to call the Surgical Center at [phone].  The Center is open Monday through Friday from 7AM to 7PM.      A registered nurse may call you a few days after your surgery to see how you are doing after your procedure.    You may also receive a survey in the mail within the next two weeks and we ask that you take a few moments to complete the survey and return it to us.  Our goal is to provide you with very good care and we value your comments.     Depression / Suicide Risk    As you are discharged from this [facility], it is important to learn how to keep safe from harming yourself.    Recognize the warning signs:  Abrupt changes in personality, positive or negative- including increase in energy   Giving away  possessions  Change in eating patterns- significant weight changes-  positive or negative  Change in sleeping patterns- unable to sleep or sleeping all the time   Unwillingness or inability to communicate  Depression  Unusual sadness, discouragement and loneliness  Talk of wanting to die  Neglect of personal appearance   Rebelliousness- reckless behavior  Withdrawal from people/activities they love  Confusion- inability to concentrate     If you or a loved one observes any of these behaviors or has concerns about self-harm, here's what you can do:  Talk about it- your feelings and reasons for harming yourself  Remove any means that you might use to hurt yourself (examples: pills, rope, extension cords, firearm)  Get professional help from the community (Mental Health, Substance Abuse, psychological counseling)  Do not be alone:Call your Safe Contact- someone whom you trust who will be there for you.  Call your local CRISIS HOTLINE 463-9968 or 015-821-9242  Call your local Children's Mobile Crisis Response Team [place] (393) 984-8603 or www.Tiqets  Call the toll free National Suicide Prevention Hotlines   National Suicide Prevention Lifeline 359-598-XNCA (0070)  National Hope Line Network 800-SUICIDE (606-6289)    I acknowledge receipt and understanding of these Home Care instructions.

## 2024-12-10 NOTE — OR NURSING
Patient discharge instructions given to wife .   Questions answered satisfactorily .   V/s stable , discomfort but no pain as reported . Catheter anchored securely at the right thigh skin is intact . With small drop of bleeding on the penis , urine  color is  yellowish approx. 100ml .   Walks with a steady gait.

## 2025-03-14 ENCOUNTER — HOSPITAL ENCOUNTER (OUTPATIENT)
Facility: MEDICAL CENTER | Age: 65
End: 2025-03-14
Attending: PHYSICIAN ASSISTANT
Payer: COMMERCIAL

## 2025-03-14 PROCEDURE — 87086 URINE CULTURE/COLONY COUNT: CPT

## 2025-03-14 PROCEDURE — 87077 CULTURE AEROBIC IDENTIFY: CPT

## 2025-04-04 ENCOUNTER — HOSPITAL ENCOUNTER (OUTPATIENT)
Facility: MEDICAL CENTER | Age: 65
End: 2025-04-04
Attending: UROLOGY
Payer: COMMERCIAL

## 2025-04-04 PROCEDURE — 87086 URINE CULTURE/COLONY COUNT: CPT

## 2025-04-04 PROCEDURE — 87077 CULTURE AEROBIC IDENTIFY: CPT

## 2025-04-12 DIAGNOSIS — I25.10 CORONARY ARTERIOSCLEROSIS: ICD-10-CM

## 2025-04-14 ENCOUNTER — TELEPHONE (OUTPATIENT)
Dept: CARDIOLOGY | Facility: MEDICAL CENTER | Age: 65
End: 2025-04-14
Payer: COMMERCIAL

## 2025-04-14 RX ORDER — ROSUVASTATIN CALCIUM 20 MG/1
20 TABLET, COATED ORAL DAILY
Qty: 90 TABLET | Refills: 0 | Status: SHIPPED | OUTPATIENT
Start: 2025-04-14

## 2025-04-14 NOTE — TELEPHONE ENCOUNTER
90 day courtesy refill for Rosuvastatin sent. Pt needs follow up appt. Last seen 1/11/24.     To Scheduling - please contact pt to schedule annual visit. Thank you!

## 2025-04-14 NOTE — TELEPHONE ENCOUNTER
Is the patient due for a refill? Yes    Was the patient seen the last 15 months? No    Date of last office visit: 1/11/24    Does the patient have an upcoming appointment?  No    Provider to refill:BE    Does the patient have detention Plus and need 100-day supply? (This applies to ALL medications) Patient does not have SCP

## 2025-05-08 ENCOUNTER — OCCUPATIONAL MEDICINE (OUTPATIENT)
Dept: URGENT CARE | Facility: CLINIC | Age: 65
End: 2025-05-08
Payer: COMMERCIAL

## 2025-05-08 ENCOUNTER — APPOINTMENT (OUTPATIENT)
Dept: RADIOLOGY | Facility: IMAGING CENTER | Age: 65
End: 2025-05-08
Attending: PHYSICIAN ASSISTANT
Payer: COMMERCIAL

## 2025-05-08 VITALS
TEMPERATURE: 98 F | BODY MASS INDEX: 37.02 KG/M2 | HEIGHT: 70 IN | SYSTOLIC BLOOD PRESSURE: 126 MMHG | HEART RATE: 77 BPM | OXYGEN SATURATION: 95 % | RESPIRATION RATE: 14 BRPM | WEIGHT: 258.6 LBS | DIASTOLIC BLOOD PRESSURE: 70 MMHG

## 2025-05-08 DIAGNOSIS — M25.522 LEFT ELBOW PAIN: ICD-10-CM

## 2025-05-08 DIAGNOSIS — S53.102A: ICD-10-CM

## 2025-05-08 DIAGNOSIS — S56.912A STRAIN OF LEFT ELBOW, INITIAL ENCOUNTER: ICD-10-CM

## 2025-05-08 PROCEDURE — 1125F AMNT PAIN NOTED PAIN PRSNT: CPT | Performed by: PHYSICIAN ASSISTANT

## 2025-05-08 PROCEDURE — 73080 X-RAY EXAM OF ELBOW: CPT | Mod: TC,LT | Performed by: RADIOLOGY

## 2025-05-08 PROCEDURE — 99214 OFFICE O/P EST MOD 30 MIN: CPT | Performed by: PHYSICIAN ASSISTANT

## 2025-05-08 PROCEDURE — 3078F DIAST BP <80 MM HG: CPT | Performed by: PHYSICIAN ASSISTANT

## 2025-05-08 PROCEDURE — 3074F SYST BP LT 130 MM HG: CPT | Performed by: PHYSICIAN ASSISTANT

## 2025-05-08 ASSESSMENT — PAIN SCALES - GENERAL: PAINLEVEL_OUTOF10: 4=SLIGHT-MODERATE PAIN

## 2025-05-08 ASSESSMENT — FIBROSIS 4 INDEX: FIB4 SCORE: 2.27

## 2025-05-08 NOTE — LETTER
"  EMPLOYEE’S CLAIM FOR COMPENSATION/ REPORT OF INITIAL TREATMENT  FORM C-4  PLEASE TYPE OR PRINT    EMPLOYEE’S CLAIM - PROVIDE ALL INFORMATION REQUESTED   First Name                    PARKER Puga                  Last Name  Emigdio Birthdate                    1960                Sex  [x]Male Claim Number (Insurer’s Use Only)     Mailing Address  [address] Age  64 y.o. Height  1.778 m (5' 10\") Weight  117 kg (258 lb 9.6 oz) Social Security Number     [facility] Zip  [address] Telephone  There are no phone numbers on file.   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   [employer]   Employee's Occupation (Job Title) When Injury or Occupational Disease Occurred      Employer's Name/Company Name  [employer]  Telephone  [phone]    Office Mail Address (Number and Street)  [address]     Date of Injury (if applicable) 5/5/2025               Hours Injury (if applicable)  2:00 PM am    pm Date Employer Notified  5/7/2025 Last Day of Work after Injury or Occupational Disease  5/7/2025 Supervisor to Whom Injury Reported  [facility]   Address or Location of Accident (if applicable)  Work [1]   What were you doing at the time of accident? (if applicable)  Picking up heavy tires    How did this injury or occupational disease occur? (Be specific and answer in detail. Use additional sheet if necessary)  Picked up tires with both hands. My left arm at the elbow pop out, I let go and it pop back in.   If you believe that you have an occupational disease, when did you first have knowledge of the disability and its relationship to your employment?   Witnesses to the Accident (if applicable)  n/a      Nature of Injury or Occupational Disease  Workers' Compensation  Part(s) of Body Injured or " Affected  Elbow (L) N/A N/A    I CERTIFY THAT THE ABOVE IS TRUE AND CORRECT TO T HE BEST OF MY KNOWLEDGE AND THAT I HAVE PROVIDED THIS INFORMATION IN ORDER TO OBTAIN THE BENEFITS OF [place]CC[place], OR CHAPTER [place]).  I HEREBY AUTHORIZE ANY PHYSICIAN, CHIROPRACTOR, SURGEON, PRACTITIONER OR ANY OTHER PERSON, ANY HOSPITAL, INCLUDING [facility] OR [facility], ANY  MEDICAL SERVICE ORGANIZATION, ANY INSURANCE COMPANY, OR OTHER INSTITUTION OR ORGANIZATION TO RELEASE TO EACH OTHER, ANY MEDICAL OR OTHER INFORMATION, INCLUDING BENEFITS PAID OR PAYABLE, PERTINENT TO THIS INJURY OR DISEASE, EXCEPT INFORMATION RELATIVE TO DIAGNOSIS, TREATMENT AND/OR COUNSELING FOR AIDS, PSYCHOLOGICAL CONDITIONS, ALCOHOL OR CONTROLLED SUBSTANCES, FOR WHICH I MUST GIVE SPECIFIC AUTHORIZATION.  A PHOTOSTAT OF THIS AUTHORIZATION SHALL BE VALID AS THE ORIGINAL.     Date 5/8/25   [facility] Employee’s Original or  *Electronic Signature   THIS REPORT MUST BE COMPLETED AND MAILED WITHIN 3 WORKING DAYS OF TREATMENT   Place  [facility]    Name of Facility  [facility]   Date 5/8/2025 Diagnosis and Description of Injury or Occupational Disease  (S56.912A) Strain of left elbow, initial encounter  (S53.102A) Subluxation of left elbow, initial encounter  Diagnoses of Strain of left elbow, initial encounter and Subluxation of left elbow, initial encounter were pertinent to this visit. Is there evidence that the injured employee was under the influence of alcohol and/or another controlled substance at the time of accident?  []No  [] Yes (if yes, please explain)   Hour 1:39 PM  No   Treatment: Light duty, ice, over-the-counter pain medication.  Recheck in 6 days.    Have you advised the patient to remain off work five days or more?   No  [] Yes  If yes, indicate dates: From_ _                                                       To __ _  [] No   If no, is the injured employee capable of: [] full duty No   [] modified duty Yes    If modified duty, specify any limitations / restrictions:__________________  ___No pushing, pulling, lifting greater than 15 pounds.___________________________     X-Ray Findings: Negative  Comments:Negative for acute bony changes of the left elbow.  Without malalignment.    From information given by the employee, together with medical evidence, can you directly connect this injury or occupational disease as job incurred?  []Yes   [] No Yes    Is additional medical care by a physician indicated? []Yes [] No  Yes    Do you know of any previous injury or disease contributing to this condition or occupational disease? []Yes [] No (Explain if yes)                          No   Date  5/8/2025 Print Health Care Provider’s Name  Lebron Patten P.A.-C. I certify that the employer’s copy of  this form was delivered to the employer on:   Address  [address] INSURER'S USE ONLY                       [facility] [place] Zip  [address] Provider’s Tax ID Number  [id number]   Telephone  Dept: [phone]    Health Care Provider’s Original or Electronic Signature      e-LEBRON Healy P.A.-C.    Degree (MD,DO, DC,PABrittanyC,APRN)  PASAM[employer]EL  Choose (if applicable)      ORIGINAL - TREATING HEALTHCARE PROVIDER PAGE 2 - INSURER/TPA PAGE 3 - EMPLOYER PAGE 4 - EMPLOYEE             Form C-4 (rev.02/25)

## 2025-05-08 NOTE — LETTER
"PHYSICIAN’S AND CHIROPRACTIC PHYSICIAN'S   PROGRESS REPORT   CERTIFICATION OF DISABILITY Claim Number:     Social Security Number:    Patient’s Name: Edd Beal Date of Injury: 5/5/2025   Employer: [employer] Name of MCO (if applicable):      Patient’s Job Description/Occupation:        Previous Injuries/Diseases/Surgeries Contributing to the Condition:  None known.       Diagnosis: (S56.912A) Strain of left elbow, initial encounter  (S53.102A) Subluxation of left elbow, initial encounter      Related to the Industrial Injury? Yes     Explain: DOI: 5/5/25- At work-picked up approximately 80 to 90 pound tire with both arms when he felt a pop to the left elbow-patient immediately let go of the tire and felt his elbow \"slipped back\".  Patient felt minimal discomfort at that time and had continued full range of motion throughout his shift.  Since then every day he has developed worsening elbow pain more locally to the inside portion of the elbow.  Worse with movement.  Denies any specific swelling, numbness or tingling to the hand.  Denies pain to the shoulder, forearm, wrist.  Denies history of same.      Objective Medical Findings: Left elbow: Without associated swelling or noted deformity.  Mild tenderness with deep palpation of the medial and lateral epicondyles.  Limited range of motion with full pronation as this is painful-although able to conduct full pronation.  Able to fully extend.  Without bicep deformity.   is equal bilaterally.  Neurovascular intact distally.  Forearm, shoulder, wrist and digits nontender.         None - Discharged                         Stable  No                 Ratable  No        Generally Improved                      X   Condition Worsened                  Condition Same  May Have Suffered a Permanent Disability No     Treatment Plan:    Over-the-counter pain medication, ice, light duty.  Light stretching.         No Change in Therapy                "   PT/OT Prescribed                      Medication May be Used While Working        Case Management                          PT/OT Discontinued    Consultation    Further Diagnostic Studies:    Prescription(s)      Neg. Xray in clinic for acute bony changes.          Released to FULL DUTY /No Restrictions on (Date):       Certified TOTALLY TEMPORARILY DISABLED (Indicate Dates) From:   To:    X  Released to RESTRICTED/Modified Duty on (Date): From: 5/8/2025 To: 5/13/2025  Restrictions Are:         No Sitting    No Standing    No Pulling Other: No pushing, pulling, lifting greater than 15 pounds.       No Bending at Waist     No Stooping     No Lifting        No Carrying     No Walking Lifting Restricted to (lbs.):          No Pushing        No Climbing     No Reaching Above Shoulders       Date of Next Visit:  5/14/2025 Date of this Exam: 5/8/2025 Physician/Chiropractic Physician Name: Lebron Patten P.A.-C. Physician/Chiropractic Physician Signature:  Dusty Gardiner DO MPH     [place]:  [address] [place] [address], [address] [place], [place] [address] - Telephone [phone] [place]:  [address] [place] [address], [address] [place], [place] [address] - Telephone [phone]    [email or web address]  D-39 (Rev. 10/24)

## 2025-05-08 NOTE — PROGRESS NOTES
"Subjective:   Edd Beal is a 64 y.o. male who presents for Work-Related Injury ([employer] W/C DOI 5/5/25, Left upper arm)      Date of Injury: 5/5/2025   Industrial Injury: Yes , Comments: DOI: 5/5/25- At work-picked up approximately 80 to 90 pound tire with both arms when he felt a pop to the left elbow-patient immediately let go of the tire and felt his elbow \"slipped back\".  Patient felt minimal discomfort at that time and had continued full range of motion throughout his shift.  Since then every day he has developed worsening elbow pain more locally to the inside portion of the elbow.  Worse with movement.  Denies any specific swelling, numbness or tingling to the hand.  Denies pain to the shoulder, forearm, wrist.  Denies history of same.   Previous Injuries/Diseases/Surgeries Contributing to the Condition: None known.         PMH:   Reviewed, see above  MEDS:  Medications were reviewed in EMR  ALLERGIES:  Allergies were reviewed in EMR  SOCHX:  Works as a    FH:   No pertinent family history to this problem     Objective:   /70   Pulse 77   Temp 36.7 °C (98 °F) (Temporal)   Resp 14   Ht 1.778 m (5' 10\")   Wt 117 kg (258 lb 9.6 oz)   SpO2 95%   BMI 37.11 kg/m²     Left elbow: Without associated swelling or noted deformity.  Mild tenderness with deep palpation of the medial and lateral epicondyles.  Limited range of motion with full pronation as this is painful-although able to conduct full pronation.  Able to fully extend.  Without bicep deformity.   is equal bilaterally.  Neurovascular intact distally.  Forearm, shoulder, wrist and digits nontender.    Assessment/Plan:     1. Strain of left elbow, initial encounter  - DX-ELBOW-COMPLETE 3+ LEFT; Future    2. Subluxation of left elbow, initial encounter      Restricted Duty FROM 5/8/2025 TO 5/13/2025, follow up scheduled on 5/14/2025 Restriction comments: No pushing, pulling, lifting greater than 15 " pounds.  Treatment plan comments: Over-the-counter pain medication, ice, light duty.  Light stretching.     Discussed x-ray findings with the patient today appears that patient may have had subluxation event of the left elbow now with residual discomfort with certain movements.  No indication that patient continues to have subluxation at this time applied pressure to the radial had with forcible pronation during visit today without click or further indication of malalignment.  While full extension is painful along with full pronation still also able to conduct such at this time.  Discussed immobilization of which at this time I feel that sling would provide stiffness and encouraged patient to contact pendulum swings along with light stretching.  Recheck next week after light duty.  Please CD39 and C4 for further information.    Differential diagnosis, natural history, supportive care, and indications for immediate follow-up discussed. Side effects of OTC or prescribed medications discussed.      Follow-up as needed if symptoms worsen or fail to improve to PCP, Urgent care or Emergency Room.     I have personally reviewed prior external notes and test results pertinent to today's visit.  I have independently reviewed and interpreted all diagnostics ordered during this urgent care acute visit.   Discussed management options (risks,benefits, and alternatives to treatment).    The patient and/or guardian demonstrated a good understanding and agreed with the treatment plan. And all questions were answered.  Please note that this dictation was created using voice recognition software. I have made a reasonable attempt to correct obvious errors, but I expect that there are errors of grammar and possibly content that I did not discover before finalizing the note.

## 2025-05-09 ENCOUNTER — OCCUPATIONAL MEDICINE (OUTPATIENT)
Dept: URGENT CARE | Facility: CLINIC | Age: 65
End: 2025-05-09
Payer: COMMERCIAL

## 2025-05-09 VITALS
HEART RATE: 80 BPM | DIASTOLIC BLOOD PRESSURE: 78 MMHG | TEMPERATURE: 98.1 F | OXYGEN SATURATION: 97 % | RESPIRATION RATE: 16 BRPM | WEIGHT: 255.73 LBS | SYSTOLIC BLOOD PRESSURE: 144 MMHG | HEIGHT: 70 IN | BODY MASS INDEX: 36.61 KG/M2

## 2025-05-09 DIAGNOSIS — S56.912D STRAIN OF LEFT ELBOW, SUBSEQUENT ENCOUNTER: Primary | ICD-10-CM

## 2025-05-09 PROCEDURE — 3078F DIAST BP <80 MM HG: CPT | Performed by: FAMILY MEDICINE

## 2025-05-09 PROCEDURE — 99213 OFFICE O/P EST LOW 20 MIN: CPT | Performed by: FAMILY MEDICINE

## 2025-05-09 PROCEDURE — 3077F SYST BP >= 140 MM HG: CPT | Performed by: FAMILY MEDICINE

## 2025-05-09 ASSESSMENT — FIBROSIS 4 INDEX: FIB4 SCORE: 2.27

## 2025-05-09 NOTE — PROGRESS NOTES
"Subjective     Jonathon Beal is a 64 y.o. male who presents with Follow-Up (Workers comp upper arm injury, arm feels much better wants to be cleared to go back to full duty)    \" Date of Injury: 5/5/2025   Industrial Injury: Yes , Comments: DOI: 5/5/25- At work-picked up approximately 80 to 90 pound tire with both arms when he felt a pop to the left elbow-patient immediately let go of the tire and felt his elbow \"slipped back\".  Patient felt minimal discomfort at that time and had continued full range of motion throughout his shift.  Since then every day he has developed worsening elbow pain more locally to the inside portion of the elbow.  Worse with movement.  Denies any specific swelling, numbness or tingling to the hand.  Denies pain to the shoulder, forearm, wrist.  Denies history of same.   Previous Injuries/Diseases/Surgeries Contributing to the Condition: None known. \"      ** Today 5/9/2025: Feels about 99% better and wants to be released.  Some minimal discomfort in the left elbow at times          HPI    Review of Systems   Musculoskeletal:  Positive for joint pain.   All other systems reviewed and are negative.             Objective     BP (!) 144/78 (BP Location: Right arm, Patient Position: Sitting, BP Cuff Size: Adult)   Pulse 80   Temp 36.7 °C (98.1 °F) (Temporal)   Resp 16   Ht 1.778 m (5' 10\")   Wt 116 kg (255 lb 11.7 oz)   SpO2 97%   BMI 36.69 kg/m²      Physical Exam  Vitals and nursing note reviewed.   Constitutional:       General: He is not in acute distress.     Appearance: Normal appearance. He is well-developed. He is not ill-appearing, toxic-appearing or diaphoretic.   HENT:      Head: Normocephalic.   Pulmonary:      Effort: Pulmonary effort is normal. No respiratory distress.   Musculoskeletal:        Arms:       Comments: Left elbow without deformity distal bicep tendon is intact without tenderness.  Full strength range of motion   Neurological:      Mental Status: He is " alert.      Motor: No abnormal muscle tone.   Psychiatric:         Mood and Affect: Mood normal.         Behavior: Behavior normal.         1. Strain of left elbow, subsequent encounter          MMI    Over-the-counter Tylenol or Motrin as needed

## 2025-05-09 NOTE — LETTER
PHYSICIAN’S AND CHIROPRACTIC PHYSICIAN'S   PROGRESS REPORT   CERTIFICATION OF DISABILITY Claim Number:     Social Security Number:    Patient’s Name: Edd Beal Date of Injury: 5/5/2025   Employer: [employer] Name of MCO (if applicable):      Patient’s Job Description/Occupation:        Previous Injuries/Diseases/Surgeries Contributing to the Condition:  None known      Diagnosis: (L31.583A) Strain of left elbow, subsequent encounter  (primary encounter diagnosis)      Related to the Industrial Injury? Yes     Explain: Left elbow injury lifting tire      Objective Medical Findings: Left elbow pain         None - Discharged                         Stable  No                 Ratable  No     X   Generally Improved                         Condition Worsened                  Condition Same  May Have Suffered a Permanent Disability No     Treatment Plan:              No Change in Therapy                  PT/OT Prescribed                      Medication May be Used While Working        Case Management                          PT/OT Discontinued    Consultation    Further Diagnostic Studies:    Prescription(s)               X  Released to FULL DUTY /No Restrictions on (Date):  5/9/2025    Certified TOTALLY TEMPORARILY DISABLED (Indicate Dates) From:   To:      Released to RESTRICTED/Modified Duty on (Date): From:   To:    Restrictions Are:  Permanent      No Sitting    No Standing    No Pulling Other:         No Bending at Waist     No Stooping     No Lifting        No Carrying     No Walking Lifting Restricted to (lbs.):          No Pushing        No Climbing     No Reaching Above Shoulders       Date of Next Visit:  5/9/2025 Date of this Exam: 5/9/2025 Physician/Chiropractic Physician Name: Дмитрий Hull M.D. Physician/Chiropractic Physician Signature:  Dusty Gardiner DO MPH     [place]:  [address], [address] [place], [place] [address] - Telephone [phone] [place]:   [address], [address] [place], [place] [address] - Telephone [phone]    [email or web address]  D-39 (Rev. 10/24)

## 2025-06-24 ENCOUNTER — TELEPHONE (OUTPATIENT)
Dept: CARDIOLOGY | Facility: MEDICAL CENTER | Age: 65
End: 2025-06-24
Payer: COMMERCIAL

## 2025-06-26 ENCOUNTER — HOSPITAL ENCOUNTER (OUTPATIENT)
Facility: MEDICAL CENTER | Age: 65
End: 2025-06-26
Attending: PHYSICIAN ASSISTANT
Payer: COMMERCIAL

## 2025-06-26 ENCOUNTER — OFFICE VISIT (OUTPATIENT)
Dept: URGENT CARE | Facility: CLINIC | Age: 65
End: 2025-06-26
Payer: COMMERCIAL

## 2025-06-26 VITALS
WEIGHT: 255.73 LBS | SYSTOLIC BLOOD PRESSURE: 122 MMHG | OXYGEN SATURATION: 93 % | BODY MASS INDEX: 36.61 KG/M2 | HEIGHT: 70 IN | RESPIRATION RATE: 16 BRPM | DIASTOLIC BLOOD PRESSURE: 76 MMHG | HEART RATE: 73 BPM | TEMPERATURE: 97.3 F

## 2025-06-26 DIAGNOSIS — N30.01 ACUTE CYSTITIS WITH HEMATURIA: ICD-10-CM

## 2025-06-26 DIAGNOSIS — N30.01 ACUTE CYSTITIS WITH HEMATURIA: Primary | ICD-10-CM

## 2025-06-26 LAB
APPEARANCE UR: ABNORMAL
BILIRUB UR STRIP-MCNC: NEGATIVE MG/DL
COLOR UR AUTO: ABNORMAL
GLUCOSE UR STRIP.AUTO-MCNC: ABNORMAL MG/DL
KETONES UR STRIP.AUTO-MCNC: NEGATIVE MG/DL
LEUKOCYTE ESTERASE UR QL STRIP.AUTO: ABNORMAL
NITRITE UR QL STRIP.AUTO: NEGATIVE
PH UR STRIP.AUTO: 7 [PH] (ref 5–8)
PROT UR QL STRIP: 30 MG/DL
RBC UR QL AUTO: ABNORMAL
SP GR UR STRIP.AUTO: 1.02
UROBILINOGEN UR STRIP-MCNC: 0.2 MG/DL

## 2025-06-26 PROCEDURE — 87077 CULTURE AEROBIC IDENTIFY: CPT

## 2025-06-26 PROCEDURE — 81002 URINALYSIS NONAUTO W/O SCOPE: CPT | Performed by: PHYSICIAN ASSISTANT

## 2025-06-26 PROCEDURE — 3078F DIAST BP <80 MM HG: CPT | Performed by: PHYSICIAN ASSISTANT

## 2025-06-26 PROCEDURE — 3074F SYST BP LT 130 MM HG: CPT | Performed by: PHYSICIAN ASSISTANT

## 2025-06-26 PROCEDURE — 87086 URINE CULTURE/COLONY COUNT: CPT

## 2025-06-26 PROCEDURE — 99213 OFFICE O/P EST LOW 20 MIN: CPT | Performed by: PHYSICIAN ASSISTANT

## 2025-06-26 RX ORDER — SULFAMETHOXAZOLE AND TRIMETHOPRIM 800; 160 MG/1; MG/1
1 TABLET ORAL 2 TIMES DAILY
Qty: 14 TABLET | Refills: 0 | Status: SHIPPED | OUTPATIENT
Start: 2025-06-26 | End: 2025-07-03

## 2025-06-26 ASSESSMENT — FIBROSIS 4 INDEX: FIB4 SCORE: 2.27

## 2025-06-26 NOTE — PROGRESS NOTES
"Subjective:   Edd Beal is a 64 y.o. male who presents for Urinary Frequency      HPI  The patient presents to the Urgent Care with complaints of UTI onset this morning. History of UTI and this feels the same. Reports of urinary urgency and frequency.  Denies any dysuria, fevers, chills, abdominal pain, flank pain.  History of cystoscopy, urethroplasty, and ureteral stent placement.  He has had a couple UTIs since procedure.        Past Medical History[1]  Allergies[2]     Objective:     /76   Pulse 73   Temp 36.3 °C (97.3 °F) (Temporal)   Resp 16   Ht 1.778 m (5' 10\")   Wt 116 kg (255 lb 11.7 oz)   SpO2 93%   BMI 36.69 kg/m²     Physical Exam  Vitals reviewed.   Constitutional:       General: He is not in acute distress.     Appearance: Normal appearance. He is not ill-appearing or toxic-appearing.   Eyes:      Conjunctiva/sclera: Conjunctivae normal.   Cardiovascular:      Rate and Rhythm: Normal rate.   Pulmonary:      Effort: Pulmonary effort is normal.   Abdominal:      General: Abdomen is flat.      Palpations: Abdomen is soft.      Tenderness: There is no abdominal tenderness. There is no right CVA tenderness, left CVA tenderness, guarding or rebound.   Musculoskeletal:      Cervical back: Neck supple. No rigidity.   Skin:     General: Skin is warm and dry.   Neurological:      General: No focal deficit present.      Mental Status: He is alert and oriented to person, place, and time.   Psychiatric:         Mood and Affect: Mood normal.         Behavior: Behavior normal.         Results for orders placed or performed in visit on 06/26/25   POCT Urinalysis    Collection Time: 06/26/25  3:26 PM   Result Value Ref Range    POC Color dark yellow Negative    POC Appearance cloudy Negative    POC Glucose negataive Negative mg/dL    POC Bilirubin negative Negative mg/dL    POC Ketones negative Negative mg/dL    POC Specific Gravity 1.025 <1.005 - >1.030    POC Blood moderate Negative    POC " Urine PH 7.0 5.0 - 8.0    POC Protein 30 Negative mg/dL    POC Urobiligen 0.2 Negative (0.2) mg/dL    POC Nitrites negative Negative    POC Leukocyte Esterase small Negative     Diagnosis and associated orders:     1. Acute cystitis with hematuria  - POCT Urinalysis  - sulfamethoxazole-trimethoprim (BACTRIM DS) 800-160 MG tablet; Take 1 Tablet by mouth 2 times a day for 7 days.  Dispense: 14 Tablet; Refill: 0  - URINE CULTURE(NEW); Future       Comments/MDM:     The patient's presenting symptoms and exam findings are consistent with a simple urinary tract infection. They are overall very well-appearing with normal vital signs and benign examination findings.   Bactrim.   Increase fluid intake.  Urine culture: will call back only if positive and if necessary change in therapy.   Advised to return to the Urgent Care or follow up with their PCP if symptoms are not improving in 2-3 days or sooner if any worsening symptoms such as fever, chills, abdominal pain, back/flank pain, nausea, vomiting, or any other concerns.        I personally reviewed prior external notes and test results pertinent to today's visit. Pathogenesis of diagnosis discussed including typical length and natural progression. Supportive care, natural history, differential diagnoses, and indications for immediate follow-up discussed. Patient expresses understanding and agrees to plan. Patient denies any other questions or concerns.     Follow-up with the primary care physician for recheck, reevaluation, and consideration of further management.    Please note that this dictation was created using voice recognition software. I have made a reasonable attempt to correct obvious errors, but I expect that there are errors of grammar and possibly content that I did not discover before finalizing the note.    This note was electronically signed by Ezekiel Verduzco PA-C         [1]   Past Medical History:  Diagnosis Date    Arthritis     Blood clotting disorder  (HCC)     DVT after knee replacement    BPH (benign prostatic hyperplasia)     Breath shortness     Bronchitis 1995    Bulbous urethral stricture 10/23/2020    Cancer (HCC)     botton lip - melanoma    Cataract     iol bilat    Chickenpox     Facial basal cell cancer 2007    lip, no recurrence    GERD (gastroesophageal reflux disease)     GI bleed     Heart murmur     High cholesterol     HLD (hyperlipidemia)     Kidney stone     Sleep apnea     uses CPAP    Urinary bladder disorder    [2] No Known Allergies

## 2025-07-17 ENCOUNTER — OFFICE VISIT (OUTPATIENT)
Dept: URGENT CARE | Facility: CLINIC | Age: 65
End: 2025-07-17
Payer: COMMERCIAL

## 2025-07-17 VITALS
HEART RATE: 74 BPM | WEIGHT: 259.04 LBS | HEIGHT: 70 IN | SYSTOLIC BLOOD PRESSURE: 124 MMHG | TEMPERATURE: 98.5 F | BODY MASS INDEX: 37.08 KG/M2 | OXYGEN SATURATION: 95 % | DIASTOLIC BLOOD PRESSURE: 80 MMHG | RESPIRATION RATE: 16 BRPM

## 2025-07-17 DIAGNOSIS — R30.0 DYSURIA: ICD-10-CM

## 2025-07-17 DIAGNOSIS — N30.00 ACUTE CYSTITIS WITHOUT HEMATURIA: Primary | ICD-10-CM

## 2025-07-17 LAB
APPEARANCE UR: NORMAL
BILIRUB UR STRIP-MCNC: NORMAL MG/DL
COLOR UR AUTO: YELLOW
GLUCOSE UR STRIP.AUTO-MCNC: NORMAL MG/DL
KETONES UR STRIP.AUTO-MCNC: NORMAL MG/DL
LEUKOCYTE ESTERASE UR QL STRIP.AUTO: NORMAL
NITRITE UR QL STRIP.AUTO: NORMAL
PH UR STRIP.AUTO: 7 [PH] (ref 5–8)
PROT UR QL STRIP: NORMAL MG/DL
RBC UR QL AUTO: NORMAL
SP GR UR STRIP.AUTO: 1.02
UROBILINOGEN UR STRIP-MCNC: 0.2 MG/DL

## 2025-07-17 PROCEDURE — 3074F SYST BP LT 130 MM HG: CPT | Performed by: PHYSICIAN ASSISTANT

## 2025-07-17 PROCEDURE — 3079F DIAST BP 80-89 MM HG: CPT | Performed by: PHYSICIAN ASSISTANT

## 2025-07-17 PROCEDURE — 81002 URINALYSIS NONAUTO W/O SCOPE: CPT | Performed by: PHYSICIAN ASSISTANT

## 2025-07-17 PROCEDURE — 99213 OFFICE O/P EST LOW 20 MIN: CPT | Performed by: PHYSICIAN ASSISTANT

## 2025-07-17 RX ORDER — SULFAMETHOXAZOLE AND TRIMETHOPRIM 800; 160 MG/1; MG/1
1 TABLET ORAL EVERY 12 HOURS
Qty: 14 TABLET | Refills: 0 | Status: SHIPPED | OUTPATIENT
Start: 2025-07-17 | End: 2025-07-24

## 2025-07-17 ASSESSMENT — ENCOUNTER SYMPTOMS
NAUSEA: 0
VOMITING: 0
ABDOMINAL PAIN: 0
FEVER: 0
FLANK PAIN: 0

## 2025-07-17 ASSESSMENT — FIBROSIS 4 INDEX: FIB4 SCORE: 2.27

## 2025-07-17 NOTE — PROGRESS NOTES
"Subjective:   Edd Beal  is a 64 y.o. male who presents for Dysuria (Burning, passes 2 stones about an hour ago)      Dysuria   This is a recurrent problem. The current episode started yesterday. Associated symptoms include frequency and urgency. Pertinent negatives include no flank pain, hematuria, nausea or vomiting.   Patient presents urgent care describing yesterday and today with symptoms of dysuria polyuria and slightly malodorous urine.  Patient saw stone in his urine and suspects possibly having passed 1 this morning.  He has a history of recurrent UTIs.  He denies hematuria.  Denies flank pain or abdominal pain.  Denies nausea or vomiting.  Patient has a history of cystoscopy urethroplasty and ureteral stent placement.  He had a UTI 3 to 4 weeks ago at which time culture showed Aerococcus urinae and he reports good resolution with treatment with Bactrim.  Patient is seen by urology and contacted his office and is hopeful he will be seen sooner than his October appointment which is his next scheduled checkup.    Review of Systems   Constitutional:  Negative for fever.   Gastrointestinal:  Negative for abdominal pain, nausea and vomiting.   Genitourinary:  Positive for dysuria, frequency and urgency. Negative for flank pain and hematuria.        Malodorous       Allergies[1]     Objective:   /80   Pulse 74   Temp 36.9 °C (98.5 °F) (Temporal)   Resp 16   Ht 1.778 m (5' 10\")   Wt 118 kg (259 lb 0.7 oz)   SpO2 95%   BMI 37.17 kg/m²     Physical Exam  Vitals and nursing note reviewed.   Constitutional:       General: He is not in acute distress.     Appearance: Normal appearance. He is well-developed. He is not diaphoretic.   HENT:      Head: Normocephalic and atraumatic.      Right Ear: External ear normal.      Left Ear: External ear normal.      Nose: Nose normal.   Eyes:      General: No scleral icterus.        Right eye: No discharge.         Left eye: No discharge.      " Conjunctiva/sclera: Conjunctivae normal.   Pulmonary:      Effort: Pulmonary effort is normal. No respiratory distress.   Abdominal:      Tenderness: There is no right CVA tenderness or left CVA tenderness.   Musculoskeletal:         General: Normal range of motion.      Cervical back: Neck supple.   Skin:     General: Skin is warm and dry.      Coloration: Skin is not pale.   Neurological:      Mental Status: He is alert and oriented to person, place, and time.      Coordination: Coordination normal.       Results for orders placed or performed in visit on 07/17/25   POCT Urinalysis    Collection Time: 07/17/25  3:20 PM   Result Value Ref Range    POC Color yellow Negative    POC Appearance cloudy Negative    POC Glucose neg Negative mg/dL    POC Bilirubin neg Negative mg/dL    POC Ketones neg Negative mg/dL    POC Specific Gravity 1.020 <1.005 - >1.030    POC Blood trace Negative    POC Urine PH 7.0 5.0 - 8.0    POC Protein trace Negative mg/dL    POC Urobiligen 0.2 Negative (0.2) mg/dL    POC Nitrites neg Negative    POC Leukocyte Esterase moderate Negative     Urine culture is pending    Assessment/Plan:   1. Acute cystitis without hematuria  - POCT Urinalysis  - URINE CULTURE(NEW); Future  - sulfamethoxazole-trimethoprim (BACTRIM DS) 800-160 MG tablet; Take 1 Tablet by mouth every 12 hours for 7 days.  Dispense: 14 Tablet; Refill: 0    2. Dysuria  - POCT Urinalysis  - URINE CULTURE(NEW); Future  Supportive care is reviewed with patient/caregiver - recommend to push PO fluids and electrolytes, nsaids/tylenol, rest, full course of abx, probiotics w/ abx, azo/cran, observe for resolution  Return to clinic with lack of resolution or progression of symptoms.  Will call if change of abx is indicated by urine cx  Follow-up with urology    I have worn an N95 mask, gloves and eye protection for the entire encounter with this patient.     Differential diagnosis, natural history, supportive care, and indications for  immediate follow-up discussed.            [1] No Known Allergies

## (undated) DEVICE — GOWN SURGICAL X-LARGE ULTRA - FILM-REINFORCED (20/CA)

## (undated) DEVICE — BASKET ZERO TIP

## (undated) DEVICE — SODIUM CHL IRRIGATION 0.9% 1000ML (12EA/CA)

## (undated) DEVICE — CANISTER SUCTION 3000ML MECHANICAL FILTER AUTO SHUTOFF MEDI-VAC NONSTERILE LF DISP  (40EA/CA)

## (undated) DEVICE — COVER LIGHT HANDLE ALC PLUS DISP (18EA/BX)

## (undated) DEVICE — HEAD HOLDER JUNIOR/ADULT

## (undated) DEVICE — SUTURE 3-0 SILK FS 18 INCH - (12PK/BX)

## (undated) DEVICE — SUTURE 3-0 27IN PDS PLUS CLR - SH (36/BX)

## (undated) DEVICE — SPONGE XRAY 8X4 STERL. 12PL - (10EA/TY 80TY/CA)

## (undated) DEVICE — GLOVE BIOGEL PI INDICATOR SZ 7.5 SURGICAL PF LF -(50/BX 4BX/CA)

## (undated) DEVICE — TUBE CONNECT SUCTION CLEAR 120 X 1/4" (50EA/CA)"

## (undated) DEVICE — WATER IRRIG. STER 3000 ML - (4/CA)

## (undated) DEVICE — ELECTRODE DUAL RETURN W/ CORD - (50/PK)

## (undated) DEVICE — ELECTRODE 850 FOAM ADHESIVE - HYDROGEL RADIOTRNSPRNT (50/PK)

## (undated) DEVICE — LACTATED RINGERS INJ 1000 ML - (14EA/CA 60CA/PF)

## (undated) DEVICE — TUBING CLEARLINK DUO-VENT - C-FLO (48EA/CA)

## (undated) DEVICE — TUBE CONNECTING SUCTION - CLEAR PLASTIC STERILE 72 IN (50EA/CA)

## (undated) DEVICE — PROTECTOR ULNA NERVE - (36PR/CA)

## (undated) DEVICE — SUTURE GENERAL

## (undated) DEVICE — DRAPE LARGE 3 QUARTER - (20/CA)

## (undated) DEVICE — WATER IRRIGATION STERILE 1000ML (12EA/CA)

## (undated) DEVICE — GLOVE BIOGEL SZ 7.5 SURGICAL PF LTX - (50PR/BX 4BX/CA)

## (undated) DEVICE — FOLEY SLIPPERY 5CC 16 FR LF ALL SILICONE (12EA/CA)

## (undated) DEVICE — NEPTUNE 4 PORT MANIFOLD - (20/PK)

## (undated) DEVICE — SYRINGE DISP. 12 CC LL - (100/BX)

## (undated) DEVICE — JELLY, KY 5GM TUBES

## (undated) DEVICE — GOWN WARMING STANDARD FLEX - (30/CA)

## (undated) DEVICE — BLADE SURGICAL CLIPPER - (50EA/CA)

## (undated) DEVICE — SPONGE GAUZESTER 4 X 4 4PLY - (128PK/CA)

## (undated) DEVICE — GOWN SURGEONS X-LARGE - DISP. (30/CA)

## (undated) DEVICE — NEEDLE SAFETY 18 GA X 1 1/2 IN (100EA/BX)

## (undated) DEVICE — HUMID-VENT HEAT AND MOISTURE EXCHANGE- (50/BX)

## (undated) DEVICE — DRAPE LAPAROTOMY T SHEET - (12EA/CA)

## (undated) DEVICE — KIT ROOM DECONTAMINATION

## (undated) DEVICE — SUTURE 4-0 MONOCRYL PLUS PS-2 - 27 INCH (36/BX)

## (undated) DEVICE — SYRINGE 30 ML LL (56/BX)

## (undated) DEVICE — KIT ANESTHESIA W/CIRCUIT & 3/LT BAG W/FILTER (20EA/CA)

## (undated) DEVICE — CONNECTOR HOSE NEPTUNE FOR CYSTO ROOM

## (undated) DEVICE — CANISTER SUCTION RIGID RED 1500CC (40EA/CA)

## (undated) DEVICE — SYRINGE TOOMEY (50EA/CA)

## (undated) DEVICE — SET IRRIGATION CYSTOSCOPY TUBE L80 IN (20EA/CA)

## (undated) DEVICE — BOVIE NEEDLE TIP 3CM COLORADO

## (undated) DEVICE — TOWELS CLOTH SURGICAL - (4/PK 20PK/CA)

## (undated) DEVICE — SET LEADWIRE 5 LEAD BEDSIDE DISPOSABLE ECG (1SET OF 5/EA)

## (undated) DEVICE — COVER MAYO STAND X-LG - (22EA/CA)

## (undated) DEVICE — CHLORAPREP 26 ML APPLICATOR - ORANGE TINT(25/CA)

## (undated) DEVICE — FIBRILLAR SURGICEL 4X4 - 10/CA

## (undated) DEVICE — SET IRRIGATION CYSTOSCOPY Y-TYPE L81 IN (20EA/CA)

## (undated) DEVICE — PACK CYSTO III (2EA/CA)

## (undated) DEVICE — SUTURE 3-0 CHROMIC GUT SH 27 (36PK/BX)"

## (undated) DEVICE — BLADE SURGICAL #11 - (50/BX)

## (undated) DEVICE — STAPLER SKIN DISP - (6/BX 10BX/CA) VISISTAT

## (undated) DEVICE — BAG URODRAIN WITH TUBING - (20/CA)

## (undated) DEVICE — BAG DRAINAGE URINARY CLOSED 2000ML (20EA/CA)

## (undated) DEVICE — MASK ANESTHESIA ADULT  - (100/CA)

## (undated) DEVICE — RINGDISP RETRACTOR LONESTAR

## (undated) DEVICE — SENSOR SPO2 NEO LNCS ADHESIVE (20/BX) SEE USER NOTES

## (undated) DEVICE — SUTURE 3-0 MONOCRYL SH (36PK/BX)

## (undated) DEVICE — CATH, COUNCIL TIP 16 FR

## (undated) DEVICE — SUTURE 4-0 VICRYL PLUS RB-1 - 27 INCH (36/BX)

## (undated) DEVICE — Device

## (undated) DEVICE — BRIEF STRETCH MATERNITY M/L - FITS 20-60IN (5EA/BG 20BG/CA)

## (undated) DEVICE — KNIFE COLD SACHSE STRAIGHT STERILE (6EA/PK)

## (undated) DEVICE — ATHLETIC SUPPORTER LARGE - (1/EA)

## (undated) DEVICE — SET EXTENSION WITH 2 PORTS (48EA/CA) ***PART #2C8610 IS A SUBSTITUTE*****

## (undated) DEVICE — SYRINGE 10 ML CONTROL LL (25EA/BX 4BX/CA)

## (undated) DEVICE — PEN SKIN MARKER W/RULER - (50EA/BX)

## (undated) DEVICE — GLOVE, LITE (PAIR)

## (undated) DEVICE — CANISTER SUCTION 3000ML MECHANICAL FILTER AUTO SHUTOFF MEDI-VAC NONSTERILE LF DISP (40EA/CA)

## (undated) DEVICE — LASER FIBER HOLM 910 MICRON 100 WATT (5EA/BX)

## (undated) DEVICE — WATER IRRIG. STER. 1500 ML - (9/CA)

## (undated) DEVICE — COVER FOOT UNIVERSAL DISP. - (25EA/CA)

## (undated) DEVICE — BAG DRAINAGE ANTIREFLUX TOWER SLIDE TAP 4000 ML (20EA/CA)

## (undated) DEVICE — SYRINGE 50ML CATHETER TIP (40EA/BX 4BX/CA)

## (undated) DEVICE — POUCH FLUID COLLECTION INVISISHIELD - (10/BX)

## (undated) DEVICE — CONTAINER, COLLECTION 10-LITER

## (undated) DEVICE — GAUZE FLUFF STERILE 2-PLY 36 X 36 (100EA/CA)

## (undated) DEVICE — SENSOR OXIMETER ADULT SPO2 RD SET (20EA/BX)

## (undated) DEVICE — SYRINGE ASEPTO - (50EA/CA

## (undated) DEVICE — CONTAINER SPECIMEN BAG OR - STERILE 4 OZ W/LID (100EA/CA)

## (undated) DEVICE — JELLY, KY 2 0Z STERILE

## (undated) DEVICE — BLADE SURGICAL #15 - (50/BX 3BX/CA)

## (undated) DEVICE — BAG, SPONGE COUNT 50600

## (undated) DEVICE — DRAIN PENROSE STERILE 1/4 X - 18 IN  (25EA/BX)

## (undated) DEVICE — SUCTION INSTRUMENT YANKAUER BULBOUS TIP W/O VENT (50EA/CA)

## (undated) DEVICE — DEVICE INFLATION ATRION NOVALFEX TRANSFEMORAL SYSTEM (1EA)

## (undated) DEVICE — NEEDLE NON SAFETY 25 GA X 1 1/2 IN HYPO (100EA/BX)

## (undated) DEVICE — CORDS BIPOLAR COAGULATION - 12FT STERILE DISP. (10EA/BX)

## (undated) DEVICE — SUTURE 5-0 PDS RB-1 (36PK/BX)

## (undated) DEVICE — SUTURE 5-0 VICRYL PLUSRB-1 - 27 INCH (36/BX)

## (undated) DEVICE — SLEEVE, VASO, THIGH, MED

## (undated) DEVICE — SLEEVE VASO DVT COMPRESSION CALF MED - (10PR/CA)

## (undated) DEVICE — TUBE E-T HI-LO CUFF 8.0MM (10EA/PK)

## (undated) DEVICE — GLOVE BIOGEL INDICATOR SZ 8 SURGICAL PF LTX - (50/BX 4BX/CA)

## (undated) DEVICE — PACK CYSTOSCOPY III - (8/CA)